# Patient Record
Sex: FEMALE | Race: BLACK OR AFRICAN AMERICAN | NOT HISPANIC OR LATINO | Employment: OTHER | URBAN - METROPOLITAN AREA
[De-identification: names, ages, dates, MRNs, and addresses within clinical notes are randomized per-mention and may not be internally consistent; named-entity substitution may affect disease eponyms.]

---

## 2017-03-03 RX ORDER — METOPROLOL SUCCINATE 50 MG/1
TABLET, EXTENDED RELEASE ORAL
Qty: 270 TABLET | Refills: 1 | Status: SHIPPED | OUTPATIENT
Start: 2017-03-03 | End: 2017-08-23 | Stop reason: SDUPTHER

## 2017-03-06 ENCOUNTER — TELEPHONE (OUTPATIENT)
Dept: INTERNAL MEDICINE | Facility: CLINIC | Age: 82
End: 2017-03-06

## 2017-03-06 DIAGNOSIS — I48.20 CHRONIC ATRIAL FIBRILLATION: Chronic | ICD-10-CM

## 2017-03-06 DIAGNOSIS — I27.20 PULMONARY HTN: Chronic | ICD-10-CM

## 2017-03-06 DIAGNOSIS — Z86.73 H/O: CVA (CEREBROVASCULAR ACCIDENT): Chronic | ICD-10-CM

## 2017-03-06 DIAGNOSIS — I10 ESSENTIAL HYPERTENSION: Chronic | ICD-10-CM

## 2017-03-06 DIAGNOSIS — L97.209: ICD-10-CM

## 2017-03-06 DIAGNOSIS — I50.22 SYSTOLIC CHF, CHRONIC: Chronic | ICD-10-CM

## 2017-03-06 DIAGNOSIS — H40.9 GLAUCOMA, UNSPECIFIED GLAUCOMA, UNSPECIFIED LATERALITY: Chronic | ICD-10-CM

## 2017-03-06 DIAGNOSIS — R60.0 BILATERAL EDEMA OF LOWER EXTREMITY: Chronic | ICD-10-CM

## 2017-03-06 NOTE — TELEPHONE ENCOUNTER
----- Message from Chetna Humphrey sent at 3/6/2017  9:24 AM CST -----  Contact: Jadyn Rogers with [facility]/[phone]   Need order in physician notes, pt requesting a power wheelchair     Att to Jadyn TATUM/Fax #[phone]    Please advise

## 2017-04-02 DIAGNOSIS — I48.20 CHRONIC ATRIAL FIBRILLATION: ICD-10-CM

## 2017-04-09 DIAGNOSIS — I48.20 CHRONIC ATRIAL FIBRILLATION: ICD-10-CM

## 2017-04-10 RX ORDER — RIVAROXABAN 20 MG/1
TABLET, FILM COATED ORAL
Qty: 90 TABLET | Refills: 0 | Status: SHIPPED | OUTPATIENT
Start: 2017-04-10 | End: 2017-04-28 | Stop reason: SDUPTHER

## 2017-04-27 ENCOUNTER — TELEPHONE (OUTPATIENT)
Dept: INTERNAL MEDICINE | Facility: CLINIC | Age: 82
End: 2017-04-27

## 2017-04-27 DIAGNOSIS — I48.20 CHRONIC ATRIAL FIBRILLATION: ICD-10-CM

## 2017-04-27 NOTE — TELEPHONE ENCOUNTER
Spoke w pt daughter; states that she was given only  two bottles and not a third one. Pt states that mother is out of medication.     Please advise.

## 2017-04-27 NOTE — TELEPHONE ENCOUNTER
----- Message from Joanne Alem sent at 4/27/2017  4:37 PM CDT -----  Contact: pt [phone]  RX request - refill or new RX.  Is this a refill or new RX:  refill  RX name and strength: rivaroxaban (XARELTO) 20 mg Tab  Directions:   Is this a 30 day or 90 day RX:    Pharmacy name and phone #: [facility] #[phone]   Comments:  Pt said the pharmacy said they filled this for a 90 supply,pt said it was not filled for 90 days and now she is out,please advise pt

## 2017-05-15 RX ORDER — VALSARTAN 320 MG/1
TABLET ORAL
Qty: 90 TABLET | Refills: 11 | Status: SHIPPED | OUTPATIENT
Start: 2017-05-15 | End: 2018-07-30 | Stop reason: SDUPTHER

## 2017-05-15 RX ORDER — AMLODIPINE BESYLATE 10 MG/1
TABLET ORAL
Qty: 90 TABLET | Refills: 11 | Status: SHIPPED | OUTPATIENT
Start: 2017-05-15 | End: 2018-07-30 | Stop reason: SDUPTHER

## 2017-06-16 RX ORDER — POTASSIUM CHLORIDE 20 MEQ/1
TABLET, EXTENDED RELEASE ORAL
Qty: 90 TABLET | Refills: 3 | Status: SHIPPED | OUTPATIENT
Start: 2017-06-16 | End: 2018-07-30 | Stop reason: SDUPTHER

## 2017-07-31 ENCOUNTER — NURSE TRIAGE (OUTPATIENT)
Dept: ADMINISTRATIVE | Facility: CLINIC | Age: 82
End: 2017-07-31

## 2017-07-31 NOTE — TELEPHONE ENCOUNTER
"    Reason for Disposition   Patient sounds very sick or weak to the triager     Celina has bilateral leg swelling from feet to thighs, with redness (like a rash, per daughter, Elisa) on the lower leg, right worse than left.  Pain 10 of 10 when touched right, 8 of 10 when touched on left.  Recommended ED now for her; Elisa will bring her to [facility] ED now, she said.  Message to Chago Jones , pcp.  Please contact caller directly with any additional care advice.    Answer Assessment - Initial Assessment Questions  1. ONSET: "When did the swelling start?" (e.g., minutes, hours, days)      Yesterday.    2. LOCATION: "What part of the leg is swollen?"  "Are both legs swollen or just one leg?"      From her thighs to her feet.    3. SEVERITY: "How bad is the swelling?" (e.g., localized; mild, moderate, severe)   - Localized - small area of swelling localized to one leg   - MILD pedal edema - swelling limited to foot and ankle, pitting edema < 1/4 inch (6 mm) deep, rest and elevation eliminate most or all swelling   - MODERATE edema - swelling of lower leg to knee, pitting edema > 1/4 inch (6 mm) deep, rest and elevation only partially reduce swelling   - SEVERE edema - swelling extends above knee, facial or hand swelling present       Severe.  Bilateral up to thighs.    4. REDNESS: "Does the swelling look red or infected?"      Yes, the right leg worse than the left, and is larger than the left.    5. PAIN: "Is the swelling painful to touch?" If so, ask: "How painful is it?"   (Scale 1-10; mild, moderate or severe)      Yes, painful to touch, right leg 10 of 10, left leg 8 of 10.    6. FEVER: "Do you have a fever?" If so, ask: "What is it, how was it measured, and when did it start?"       Not that I know of.    7. CAUSE: "What do you think is causing the leg swelling?"      Not known.    8. MEDICAL HISTORY: "Do you have a history of heart failure, kidney disease, liver failure, or cancer?"      Yes, CHF.    9. " "RECURRENT SYMPTOM: "Have you had leg swelling before?" If so, ask: "When was the last time?" "What happened that time?"      Yes, but not this bad.    10. OTHER SYMPTOMS: "Do you have any other symptoms?" (e.g., chest pain, difficulty breathing)        No chest pain, no difficulty breathing.  n  11. PREGNANCY: "Is there any chance you are pregnant?" "When was your last menstrual period?"        n/a    Protocols used: ST LEG SWELLING AND EDEMA-A-AH      "

## 2017-08-01 ENCOUNTER — HOSPITAL ENCOUNTER (EMERGENCY)
Facility: HOSPITAL | Age: 82
Discharge: HOME | End: 2017-08-01
Attending: EMERGENCY MEDICINE | Admitting: EMERGENCY MEDICINE
Payer: MEDICARE

## 2017-08-01 ENCOUNTER — TELEPHONE (OUTPATIENT)
Dept: INTERNAL MEDICINE | Facility: CLINIC | Age: 82
End: 2017-08-01

## 2017-08-01 VITALS
WEIGHT: 240 LBS | HEART RATE: 75 BPM | HEIGHT: 66 IN | TEMPERATURE: 98 F | OXYGEN SATURATION: 100 % | SYSTOLIC BLOOD PRESSURE: 116 MMHG | DIASTOLIC BLOOD PRESSURE: 61 MMHG | RESPIRATION RATE: 18 BRPM | BODY MASS INDEX: 38.57 KG/M2

## 2017-08-01 DIAGNOSIS — I50.9 CHF (CONGESTIVE HEART FAILURE): ICD-10-CM

## 2017-08-01 DIAGNOSIS — Z86.73 H/O: CVA (CEREBROVASCULAR ACCIDENT): Chronic | ICD-10-CM

## 2017-08-01 DIAGNOSIS — I50.22 SYSTOLIC CHF, CHRONIC: Chronic | ICD-10-CM

## 2017-08-01 DIAGNOSIS — Z79.01 LONG TERM (CURRENT) USE OF ANTICOAGULANTS: Chronic | ICD-10-CM

## 2017-08-01 LAB
ALBUMIN SERPL BCP-MCNC: 3.6 G/DL
ALP SERPL-CCNC: 79 U/L
ALT SERPL W/O P-5'-P-CCNC: <5 U/L
ANION GAP SERPL CALC-SCNC: 7 MMOL/L
AST SERPL-CCNC: 14 U/L
BASOPHILS # BLD AUTO: 0.03 K/UL
BASOPHILS NFR BLD: 0.5 %
BILIRUB SERPL-MCNC: 0.8 MG/DL
BNP SERPL-MCNC: 317 PG/ML
BUN SERPL-MCNC: 15 MG/DL
CALCIUM SERPL-MCNC: 9.9 MG/DL
CHLORIDE SERPL-SCNC: 102 MMOL/L
CO2 SERPL-SCNC: 28 MMOL/L
CREAT SERPL-MCNC: 0.9 MG/DL
DIFFERENTIAL METHOD: ABNORMAL
EOSINOPHIL # BLD AUTO: 0.2 K/UL
EOSINOPHIL NFR BLD: 2.9 %
ERYTHROCYTE [DISTWIDTH] IN BLOOD BY AUTOMATED COUNT: 14.8 %
EST. GFR  (AFRICAN AMERICAN): >60 ML/MIN/1.73 M^2
EST. GFR  (NON AFRICAN AMERICAN): 58.9 ML/MIN/1.73 M^2
GLUCOSE SERPL-MCNC: 110 MG/DL
HCT VFR BLD AUTO: 32.6 %
HGB BLD-MCNC: 10.7 G/DL
LYMPHOCYTES # BLD AUTO: 1.7 K/UL
LYMPHOCYTES NFR BLD: 29.4 %
MCH RBC QN AUTO: 29 PG
MCHC RBC AUTO-ENTMCNC: 32.8 G/DL
MCV RBC AUTO: 88 FL
MONOCYTES # BLD AUTO: 0.6 K/UL
MONOCYTES NFR BLD: 9.6 %
NEUTROPHILS # BLD AUTO: 3.4 K/UL
NEUTROPHILS NFR BLD: 57.4 %
PLATELET # BLD AUTO: 206 K/UL
PMV BLD AUTO: 10.3 FL
POTASSIUM SERPL-SCNC: 3.6 MMOL/L
PROT SERPL-MCNC: 7.8 G/DL
RBC # BLD AUTO: 3.69 M/UL
SODIUM SERPL-SCNC: 137 MMOL/L
WBC # BLD AUTO: 5.85 K/UL

## 2017-08-01 PROCEDURE — 93010 ELECTROCARDIOGRAM REPORT: CPT | Mod: ,,, | Performed by: INTERNAL MEDICINE

## 2017-08-01 PROCEDURE — 83880 ASSAY OF NATRIURETIC PEPTIDE: CPT

## 2017-08-01 PROCEDURE — 80053 COMPREHEN METABOLIC PANEL: CPT

## 2017-08-01 PROCEDURE — 85025 COMPLETE CBC W/AUTO DIFF WBC: CPT

## 2017-08-01 PROCEDURE — 99284 EMERGENCY DEPT VISIT MOD MDM: CPT | Mod: 25

## 2017-08-01 PROCEDURE — 93005 ELECTROCARDIOGRAM TRACING: CPT

## 2017-08-01 PROCEDURE — 63600175 PHARM REV CODE 636 W HCPCS: Performed by: PHYSICIAN ASSISTANT

## 2017-08-01 PROCEDURE — 96374 THER/PROPH/DIAG INJ IV PUSH: CPT

## 2017-08-01 PROCEDURE — 99285 EMERGENCY DEPT VISIT HI MDM: CPT | Mod: ,,, | Performed by: PHYSICIAN ASSISTANT

## 2017-08-01 RX ORDER — FUROSEMIDE 10 MG/ML
40 INJECTION INTRAMUSCULAR; INTRAVENOUS
Status: COMPLETED | OUTPATIENT
Start: 2017-08-01 | End: 2017-08-01

## 2017-08-01 RX ADMIN — FUROSEMIDE 40 MG: 10 INJECTION, SOLUTION INTRAVENOUS at 05:08

## 2017-08-01 NOTE — DISCHARGE INSTRUCTIONS
Follow up with your primary care provider  Increased your Lasix to 80 mg,  take 40 mg twice daily for 3 days  Return to the ER for any new symptoms

## 2017-08-01 NOTE — ED TRIAGE NOTES
PATIENT PRESENTS COMPLAINING OF BILATERAL LOWER EXTREMITY SWELLING AND PAIN. A/O X 4, GCS 15, NAD NOTED, EDEMA NOTED TO LOWER EXTREMITIES.

## 2017-08-01 NOTE — TELEPHONE ENCOUNTER
Spoke with pt daughter to set up appt for HOSP F/U with PCP. Pt's daughter will call back to schedule, she have to check her work schedule.

## 2017-08-01 NOTE — ED PROVIDER NOTES
Encounter Date: 8/1/2017    SCRIBE #1 NOTE: I, Bill Stokes, am scribing for, and in the presence of,  Dr. Michaels. I have scribed the following portions of the note - the EKG reading.       History     Chief Complaint   Patient presents with    fluid overload     pt complaining of swelling to bilateral legs. pt denies chest pain or SOB     84-year-old female presents to the ER with her daughter for evaluation of fluid overload.  Patient reports today she has noticed increased swelling to her legs bilaterally.  She denies any shortness of breath, chest pain, fever, chills.  She denies any nausea vomiting or abdominal pain.  She has no orthopnea or PND.  Patient does take diuretics at home, Lasix 40 once daily.  Patient is speaking in complete sentences and does not appear to be winded.           Review of patient's allergies indicates:   Allergen Reactions    Iodine and iodide containing products Hives    Sulfa (sulfonamide antibiotics) Hives     Past Medical History:   Diagnosis Date    Anticoagulant long-term use     Arthritis     Atrial fibrillation     Cataract     Cataract associated with another disorder     CHF (congestive heart failure) 1/10/2013    Hypertension     Potassium (K) deficiency     Stroke      Past Surgical History:   Procedure Laterality Date    HYSTERECTOMY       Family History   Problem Relation Age of Onset    Hypertension Mother     Hypertension Sister     Hypertension Brother     Amblyopia Neg Hx     Blindness Neg Hx     Cancer Neg Hx     Cataracts Neg Hx     Diabetes Neg Hx     Glaucoma Neg Hx     Macular degeneration Neg Hx     Retinal detachment Neg Hx     Strabismus Neg Hx     Stroke Neg Hx     Thyroid disease Neg Hx      Social History   Substance Use Topics    Smoking status: Never Smoker    Smokeless tobacco: Not on file    Alcohol use No     Review of Systems   Constitutional: Negative for fever.   HENT: Negative for sore throat.    Respiratory:  Negative for shortness of breath.    Cardiovascular: Negative for chest pain.   Gastrointestinal: Negative for nausea.   Genitourinary: Negative for dysuria.   Musculoskeletal: Negative for back pain.   Skin: Negative for rash.   Neurological: Negative for weakness.   Hematological: Does not bruise/bleed easily.       Physical Exam     Initial Vitals [08/01/17 0132]   BP Pulse Resp Temp SpO2   (!) 177/91 71 18 98.2 °F (36.8 °C) 98 %      MAP       119.67         Physical Exam    Constitutional: Vital signs are normal. She appears well-developed and well-nourished. She is not diaphoretic. No distress.   HENT:   Head: Normocephalic and atraumatic.   Right Ear: External ear normal.   Left Ear: External ear normal.   Eyes: Conjunctivae and EOM are normal. Right eye exhibits no discharge.   Cardiovascular: Normal rate. Exam reveals no gallop and no friction rub.    No murmur heard.  Pulmonary/Chest: No respiratory distress. She has no wheezes. She has no rhonchi. She has no rales. She exhibits no tenderness.   CTAB   Abdominal: Soft. Normal appearance, normal aorta and bowel sounds are normal. She exhibits no distension and no mass. There is no tenderness. There is no rebound and no guarding.   Musculoskeletal: Normal range of motion.   2+ edema to the bilateral lower extremities up to the knees   Neurological: She is alert and oriented to person, place, and time.   Skin: Skin is warm and intact.   Psychiatric: She has a normal mood and affect. Her speech is normal and behavior is normal. Cognition and memory are normal.         ED Course   Procedures  Labs Reviewed   CBC W/ AUTO DIFFERENTIAL - Abnormal; Notable for the following:        Result Value    RBC 3.69 (*)     Hemoglobin 10.7 (*)     Hematocrit 32.6 (*)     RDW 14.8 (*)     All other components within normal limits   COMPREHENSIVE METABOLIC PANEL - Abnormal; Notable for the following:     ALT <5 (*)     Anion Gap 7 (*)     eGFR if non  58.9 (*)      All other components within normal limits   B-TYPE NATRIURETIC PEPTIDE - Abnormal; Notable for the following:      (*)     All other components within normal limits     EKG Readings: (Independently Interpreted)   Atrial fibrillation, left axis deviation, Q waves inferiorly, no ST elevation or depression.           Medical Decision Making:   History:   Old Medical Records: I decided to obtain old medical records.  Independently Interpreted Test(s):   I have ordered and independently interpreted EKG Reading(s) - see prior notes  Clinical Tests:   Lab Tests: Ordered and Reviewed  Radiological Study: Ordered and Reviewed  Medical Tests: Ordered and Reviewed  ED Management:  84-year-old female presenting to the ER with edema to the bilateral lower extremities  Chest x-ray reveals no acute findings, BNP slightly elevated  Clinically the patient does not look significantly fluid overloaded, and she has no respiratory symptoms.   She was treated in the ED with 40 mg of Lasix IV.  I will double her Lasix for the next couple days and recommend that she follow up with her PCP.  Return instructions given.  Patient stable for discharge.            Scribe Attestation:   Scribe #1: I performed the above scribed service and the documentation accurately describes the services I performed. I attest to the accuracy of the note.    Attending Attestation:     Physician Attestation Statement for NP/PA:   I discussed this assessment and plan of this patient with the NP/PA, but I did not personally examine the patient. The face to face encounter was performed by the NP/PA.    Other NP/PA Attestation Additions:    History of Present Illness: Leg swelling         Physician Attestation for Scribe:  Physician Attestation Statement for Scribe #1: I, Dr. Michaels, reviewed documentation, as scribed by Bill Stokes in my presence, and it is both accurate and complete.                 ED Course     Clinical Impression:   The encounter  diagnosis was CHF (congestive heart failure).    Disposition:   Disposition: Discharged  Condition: Stable                        Remy Martins PA-C  08/01/17 0705       Bhanu Michaels III, MD  08/02/17 0684

## 2017-08-01 NOTE — TELEPHONE ENCOUNTER
Pt's daughter Viky came by the office this morning. Pt was in the ER for bilateral edema and was discharged. Viky would like dr Jones to give her a call.

## 2017-08-03 NOTE — TELEPHONE ENCOUNTER
Spoke with pt daughter, she can not come tomorrow. Scheduled appt at daughter's availability. Also daughter stated she discussed  involvement with you last visit and would like to see if possible can a  be scheduled/available on day of appt with you. Please advise.

## 2017-08-03 NOTE — TELEPHONE ENCOUNTER
Hi, please call her daughter --  I was out of town and just returned today, I am sorry for the delay in calling back. Please see if patient would like a followup with me, I can add her on for tomorrow/Friday at 11am.  Please let me know.  Thank you, Chago Jones

## 2017-08-03 NOTE — TELEPHONE ENCOUNTER
Hi, [facility] has their own /social workers that they use and she will need to call [facility] for help with a .  Thank you, Chago Jones

## 2017-08-04 ENCOUNTER — OUTPATIENT CASE MANAGEMENT (OUTPATIENT)
Dept: ADMINISTRATIVE | Facility: OTHER | Age: 82
End: 2017-08-04

## 2017-08-04 NOTE — TELEPHONE ENCOUNTER
Spoke with daughter, she canceled 08/09/17 appt because she has one in [place] but I was able to get them in on 08/08/17.

## 2017-08-04 NOTE — TELEPHONE ENCOUNTER
Hi, I recommend the lasix 40mg twice per day and come in next week (do she need a refill?). Is there a time she can come in next week? I see that they rescheduled 8/9 to 8/15.  Thank you, Chago Jones

## 2017-08-04 NOTE — TELEPHONE ENCOUNTER
Spoke with pt daughter, daughter is stating that Pt still having excess swelling in her legs and feet even after increasing Lasix medication to two a day. Pt's daughter would like to know if she should continue two Lasix, decrease back to one Lasix or schedule urgent appt? Please advise.

## 2017-08-04 NOTE — PROGRESS NOTES
Please note the following patient's information has been forwarded to [facility] for case mgmt or  by Outpatient Case Management.    Please see the media section of patient's chart for additional details.    Please contact Ext. [phone] with any questions.    Thank you,    Paloa Kirkpatrick, SSC

## 2017-08-15 ENCOUNTER — TELEPHONE (OUTPATIENT)
Dept: INTERNAL MEDICINE | Facility: CLINIC | Age: 82
End: 2017-08-15

## 2017-08-15 NOTE — TELEPHONE ENCOUNTER
----- Message from Unique Deng sent at 8/15/2017 10:39 AM CDT -----  Contact: Daughter/Maribel/[phone] cell   Pt's daughter said that she is calling in regards to needing to schedule pt for an appointment next week she is asking for anytime after 2pm she would like the appt mailed to her at:[address], [place].[address]. Please call and advise              Thanks!!!

## 2017-08-16 ENCOUNTER — TELEPHONE (OUTPATIENT)
Dept: INTERNAL MEDICINE | Facility: CLINIC | Age: 82
End: 2017-08-16

## 2017-08-16 NOTE — TELEPHONE ENCOUNTER
Hi, please call her daughter --  It has been over one year since I last saw her. I know that she has tried to schedule an appt with me, but they have canceled. Please try the daughter again and see if they can try again to schedule an appt, please let me know.  Thank you, Chago Jones

## 2017-08-16 NOTE — TELEPHONE ENCOUNTER
----- Message from Candice Nowak sent at 8/16/2017 10:45 AM CDT -----  Contact: Self/ [phone]   Pt want to speak with someone in the office about changing the appt on 08/25. Please call and advise     Thank you

## 2017-08-23 ENCOUNTER — OFFICE VISIT (OUTPATIENT)
Dept: INTERNAL MEDICINE | Facility: CLINIC | Age: 82
End: 2017-08-23
Payer: MEDICARE

## 2017-08-23 VITALS
HEART RATE: 72 BPM | WEIGHT: 237.44 LBS | DIASTOLIC BLOOD PRESSURE: 70 MMHG | SYSTOLIC BLOOD PRESSURE: 100 MMHG | BODY MASS INDEX: 38.91 KG/M2

## 2017-08-23 DIAGNOSIS — I10 ESSENTIAL HYPERTENSION: Chronic | ICD-10-CM

## 2017-08-23 DIAGNOSIS — K21.9 GASTROESOPHAGEAL REFLUX DISEASE WITHOUT ESOPHAGITIS: ICD-10-CM

## 2017-08-23 DIAGNOSIS — B35.1 ONYCHOMYCOSIS: ICD-10-CM

## 2017-08-23 DIAGNOSIS — I50.22 SYSTOLIC CHF, CHRONIC: Primary | Chronic | ICD-10-CM

## 2017-08-23 PROCEDURE — 3078F DIAST BP <80 MM HG: CPT | Mod: S$GLB,,, | Performed by: INTERNAL MEDICINE

## 2017-08-23 PROCEDURE — 3074F SYST BP LT 130 MM HG: CPT | Mod: S$GLB,,, | Performed by: INTERNAL MEDICINE

## 2017-08-23 PROCEDURE — 99999 PR PBB SHADOW E&M-EST. PATIENT-LVL III: CPT | Mod: PBBFAC,,, | Performed by: INTERNAL MEDICINE

## 2017-08-23 PROCEDURE — 99215 OFFICE O/P EST HI 40 MIN: CPT | Mod: S$GLB,,, | Performed by: INTERNAL MEDICINE

## 2017-08-23 PROCEDURE — 3008F BODY MASS INDEX DOCD: CPT | Mod: S$GLB,,, | Performed by: INTERNAL MEDICINE

## 2017-08-23 PROCEDURE — 1159F MED LIST DOCD IN RCRD: CPT | Mod: S$GLB,,, | Performed by: INTERNAL MEDICINE

## 2017-08-23 PROCEDURE — 99499 UNLISTED E&M SERVICE: CPT | Mod: S$GLB,,, | Performed by: INTERNAL MEDICINE

## 2017-08-23 RX ORDER — ESOMEPRAZOLE MAGNESIUM 40 MG/1
40 CAPSULE, DELAYED RELEASE ORAL
Qty: 90 CAPSULE | Refills: 3 | Status: SHIPPED | OUTPATIENT
Start: 2017-08-23 | End: 2018-08-29 | Stop reason: SDUPTHER

## 2017-08-23 RX ORDER — METOPROLOL SUCCINATE 50 MG/1
TABLET, EXTENDED RELEASE ORAL
Qty: 270 TABLET | Refills: 11 | Status: SHIPPED | OUTPATIENT
Start: 2017-08-23 | End: 2018-08-28 | Stop reason: SDUPTHER

## 2017-08-23 RX ORDER — FUROSEMIDE 40 MG/1
40 TABLET ORAL DAILY
Qty: 90 TABLET | Refills: 11 | Status: SHIPPED | OUTPATIENT
Start: 2017-08-23 | End: 2017-09-05

## 2017-08-23 NOTE — PROGRESS NOTES
Subjective:       Patient ID: Celina Parra is a 84 y.o. female.    Chief Complaint: No chief complaint on file.    Feet were v swollen and went to ED. Swelling is now btr.    Some sores on the lower legs.    Housing difficulties, was living in [facility] and evicted since apt super was concerned about her safety since there were steps within the unit. Now in hotel and awaiting housing. very challging to get out of house, needs foot care.    She c/o sinus gtt, chronic stable.    Shoulder pains bilat.    Significant nocturia at noc.      Review of Systems   Constitutional: Negative for activity change, appetite change, diaphoresis, fatigue, fever and unexpected weight change.   HENT: Negative for congestion.    Respiratory: Negative for chest tightness and wheezing.    Cardiovascular: Positive for leg swelling. Negative for chest pain and palpitations.   Gastrointestinal: Negative for abdominal distention, abdominal pain, blood in stool, nausea and vomiting.   Genitourinary: Negative for decreased urine volume and difficulty urinating.        Has large volume noc time incont   Musculoskeletal: Positive for arthralgias, gait problem and joint swelling (L knee). Negative for back pain, neck pain and neck stiffness.   Skin: Negative for wound.   Neurological: Positive for weakness. Negative for syncope and headaches.   Hematological: Bruises/bleeds easily.        Small blister posterior L calf   Psychiatric/Behavioral: Negative for confusion.       Objective:      Physical Exam   Constitutional: She is oriented to person, place, and time. She appears well-developed and well-nourished. No distress.   Not toxic appearing.  Very weak and difficult even to stand to have wt checked   HENT:   Head: Atraumatic.   Mouth/Throat: No oropharyngeal exudate.   Eyes: Pupils are equal, round, and reactive to light. No scleral icterus.   Cardiovascular:   Irregularly irregular     Pulmonary/Chest: Effort normal and breath sounds normal.    Abdominal: Soft. Bowel sounds are normal. She exhibits no distension. There is no tenderness.   Musculoskeletal:   Minimal movement of the L knee causes pain and voluntary guarding.  L knee bony hypertrophy w/o warmth or redness.  Decreased rom.    Feet with onychogryphosis in nails   Neurological: She is alert and oriented to person, place, and time.   Unable to weight bear alone, bilat prox muscles 4/5 strength to resistant testing.       Skin: Skin is warm and dry. She is not diaphoretic.   A few dry blisters posterior L calf with chronic venous stasis. No ulcers or open wounds of the legs   Psychiatric: She has a normal mood and affect. Her behavior is normal.       Assessment:       1. Onychomycosis    2. Systolic CHF, chronic    3. Essential hypertension    4. Gastroesophageal reflux disease without esophagitis        Plan:       Diagnoses and all orders for this visit:    Onychomycosis  -     Ambulatory Referral to Podiatry    Systolic CHF, chronic  -     metoprolol succinate (TOPROL-XL) 50 MG 24 hr tablet; TAKE 3 TABLETS (150 MG TOTAL) BY MOUTH ONCE DAILY.  -     furosemide (LASIX) 40 MG tablet; Take 1 tablet (40 mg total) by mouth once daily.  Volume stable, chronic edema.  Leg edema at baseline now, posterior L lower leg with small blisters from edema, she will monitor. No SOI at this time    Essential hypertension  -     furosemide (LASIX) 40 MG tablet; Take 1 tablet (40 mg total) by mouth once daily.  controlled    Gastroesophageal reflux disease without esophagitis  -     esomeprazole (NEXIUM) 40 MG capsule; Take 1 capsule (40 mg total) by mouth before breakfast.    Care coordination with [facility] re CHF care, case management re housing    Over 40 minutes spent with patient and majority in counseling and patient education.      Return in about 3 months (around 11/23/2017).

## 2017-08-24 ENCOUNTER — TELEPHONE (OUTPATIENT)
Dept: INTERNAL MEDICINE | Facility: CLINIC | Age: 82
End: 2017-08-24

## 2017-08-24 NOTE — TELEPHONE ENCOUNTER
From Nicole from [employer]:  Good Morning ,    I just wanted to follow up on this request  . I s/w the daughter on yesterday . The patient is already connected with a [address] [employer] voucher at this time for housing , [facility] provides residents with a . The patient voucher is still active. The daughter is aware of the  and has contact info . I encouraged the daughter to keep in touch with the  about difficulty finding homes/ apartments that accept the voucher. I also provided her with the following resources: contact number for [employer] and mailed a senior resource guide .     We discussed alternative living arrangements for patient , assisted living v/s nursing home - currently not interested at this time . She would like to continue to look for an apartment for the patient to live independently at this time.     The daughter was offered CHF Case Management earlier this month and declined. I explained the program to her , The program is predominantly tele-health with a registered nurse to provide education r/t the chronic disease as well as review medication adherence and symptom management at home . She agreed to accept the education . The patient and a need for a referral was discussed  in our Case Conference on yesterday . The  will attempt outreach again to the daughter .     Our Care Team discussed the patient being involved in the teaching as well . We reviewed her chronic diseases and did not note any cognitive barriers.  The patient lives alone , daughter acknowledged not living w/ the patient . Reports she does cook for the patient. Caregiver support will be evaluated by  regarding medication administration and meal preparation .      You will receive an update / case conference from the  after her contact with the daughter.

## 2017-09-04 DIAGNOSIS — I50.22 SYSTOLIC CHF, CHRONIC: Chronic | ICD-10-CM

## 2017-09-04 DIAGNOSIS — I10 ESSENTIAL HYPERTENSION: Chronic | ICD-10-CM

## 2017-09-05 RX ORDER — FUROSEMIDE 40 MG/1
40 TABLET ORAL DAILY
Qty: 90 TABLET | Refills: 3 | Status: SHIPPED | OUTPATIENT
Start: 2017-09-05 | End: 2018-08-29

## 2017-09-28 DIAGNOSIS — R09.81 SINUS CONGESTION: ICD-10-CM

## 2017-09-29 RX ORDER — FLUTICASONE PROPIONATE 50 MCG
SPRAY, SUSPENSION (ML) NASAL
Qty: 48 G | Refills: 11 | Status: SHIPPED | OUTPATIENT
Start: 2017-09-29 | End: 2018-10-28 | Stop reason: SDUPTHER

## 2017-11-15 ENCOUNTER — IMMUNIZATION (OUTPATIENT)
Dept: INTERNAL MEDICINE | Facility: CLINIC | Age: 82
End: 2017-11-15
Payer: MEDICARE

## 2017-11-15 ENCOUNTER — OFFICE VISIT (OUTPATIENT)
Dept: INTERNAL MEDICINE | Facility: CLINIC | Age: 82
End: 2017-11-15
Payer: MEDICARE

## 2017-11-15 VITALS
BODY MASS INDEX: 38.04 KG/M2 | HEART RATE: 72 BPM | SYSTOLIC BLOOD PRESSURE: 121 MMHG | WEIGHT: 232.13 LBS | DIASTOLIC BLOOD PRESSURE: 87 MMHG

## 2017-11-15 DIAGNOSIS — K21.9 GASTROESOPHAGEAL REFLUX DISEASE WITHOUT ESOPHAGITIS: ICD-10-CM

## 2017-11-15 DIAGNOSIS — I50.22 SYSTOLIC CHF, CHRONIC: Primary | Chronic | ICD-10-CM

## 2017-11-15 DIAGNOSIS — I48.20 CHRONIC ATRIAL FIBRILLATION: Chronic | ICD-10-CM

## 2017-11-15 DIAGNOSIS — R60.0 BILATERAL EDEMA OF LOWER EXTREMITY: Chronic | ICD-10-CM

## 2017-11-15 DIAGNOSIS — I10 ESSENTIAL HYPERTENSION: Chronic | ICD-10-CM

## 2017-11-15 DIAGNOSIS — Z79.01 LONG TERM CURRENT USE OF ANTICOAGULANT THERAPY: ICD-10-CM

## 2017-11-15 PROCEDURE — G0008 ADMIN INFLUENZA VIRUS VAC: HCPCS | Mod: S$GLB,,, | Performed by: INTERNAL MEDICINE

## 2017-11-15 PROCEDURE — 99999 PR PBB SHADOW E&M-EST. PATIENT-LVL III: CPT | Mod: PBBFAC,,, | Performed by: INTERNAL MEDICINE

## 2017-11-15 PROCEDURE — 99214 OFFICE O/P EST MOD 30 MIN: CPT | Mod: S$GLB,,, | Performed by: INTERNAL MEDICINE

## 2017-11-15 PROCEDURE — 90662 IIV NO PRSV INCREASED AG IM: CPT | Mod: S$GLB,,, | Performed by: INTERNAL MEDICINE

## 2017-11-15 PROCEDURE — 99499 UNLISTED E&M SERVICE: CPT | Mod: S$GLB,,, | Performed by: INTERNAL MEDICINE

## 2017-11-15 NOTE — PROGRESS NOTES
Subjective:       Patient ID: Celina Parra is a 84 y.o. female.    Chief Complaint: Follow-up    Throat irritation, bitter back of throat as well. Stomach pains upper abd. She gets acid burn in cheast as wekl. Some regur of food with cough.    No new other symptoms.    Taking meds except prn nexium.    Not coughing up anything purulent.    No f/c/ns.    No recent episodes of extreme dyspnea.    Most significant issue with chronic nocturia. Drinks 16 oz at noc and cup of tear.    Never receibed home POV.      Review of Systems   Constitutional: Negative for activity change, appetite change, diaphoresis, fatigue, fever and unexpected weight change.   HENT: Negative for congestion.    Respiratory: Negative for chest tightness and wheezing.    Cardiovascular: Negative for chest pain, palpitations and leg swelling (no longer).   Gastrointestinal: Negative for abdominal distention, abdominal pain, blood in stool, nausea and vomiting.   Genitourinary: Negative for decreased urine volume and difficulty urinating.        Has large volume noc time incont   Musculoskeletal: Positive for arthralgias, gait problem and joint swelling (L knee). Negative for back pain, neck pain and neck stiffness.   Skin: Negative for wound.   Neurological: Positive for weakness. Negative for syncope and headaches.   Hematological: Bruises/bleeds easily.   Psychiatric/Behavioral: Negative for confusion.       Objective:      Physical Exam   Constitutional: She is oriented to person, place, and time. She appears well-developed and well-nourished. No distress.   Not toxic appearing.  Very weak and difficult even to stand to have wt checked   HENT:   Head: Atraumatic.   Mouth/Throat: No oropharyngeal exudate.   Eyes: Pupils are equal, round, and reactive to light. No scleral icterus.   Cardiovascular:   Irregularly irregular     Pulmonary/Chest: Effort normal and breath sounds normal.   Abdominal: Soft. Bowel sounds are normal. She exhibits no  distension. There is no tenderness.   Musculoskeletal:   Minimal movement of the L knee causes pain and voluntary guarding.  L knee bony hypertrophy w/o warmth or redness.  Decreased rom.    Feet with onychogryphosis in nails   Neurological: She is alert and oriented to person, place, and time.   Unable to weight bear alone, bilat prox muscles 4/5 strength to resistant testing.       Skin: Skin is warm and dry. She is not diaphoretic.   No current blisters, No ulcers or open wounds of the legs   Psychiatric: She has a normal mood and affect. Her behavior is normal.       Assessment:       No diagnosis found.    Plan:         Here for f/u.    She is relatively stable w/o any major changes.    She definitely would benefit from a POV/electric wheelchair, will email Nicole re status.    CHF is stable on current meds, she is near euvolemic.    Housing situatioh much better now.    She has symptoms of GERD with bitter waterbrash and actual acid burn/reflux.  Lengthy discussion re importance of daily nexium use before eating.    Large volume nocturia -- cut out liquids, especially tea, after 6pm.    There are no diagnoses linked to this encounter.    Health Maintenance       Date Due Completion Date    TETANUS VACCINE 05/28/1951 ---    DEXA SCAN 05/28/1973 ---    Zoster Vaccine 05/28/1993 ---    Influenza Vaccine 08/01/2017 12/1/2015    Lipid Panel 09/23/2020 9/23/2015      Flu vax please      Return in about 3 months (around 2/15/2018).

## 2017-11-27 NOTE — PROGRESS NOTES
Patient, Celina Parra (MRN #[id number]), presented with a recorded BMI of 38.04 kg/m^2 and a documented comorbidity(s):  - Hypertension  - Atrial Fibrillation  - Atrial Fibrillation  to which the severe obesity is a contributing factor. This is consistent with the definition of severe obesity (BMI 35.0-35.9) with comorbidity (ICD-10 E66.01, Z68.35). The patient's severe obesity was monitored, evaluated, addressed and/or treated. This addendum to the medical record is made on 11/27/2017.

## 2018-05-04 DIAGNOSIS — I48.20 CHRONIC ATRIAL FIBRILLATION: ICD-10-CM

## 2018-05-04 RX ORDER — RIVAROXABAN 20 MG/1
TABLET, FILM COATED ORAL
Qty: 90 TABLET | Refills: 3 | Status: SHIPPED | OUTPATIENT
Start: 2018-05-04 | End: 2019-04-09 | Stop reason: SDUPTHER

## 2018-07-30 RX ORDER — POTASSIUM CHLORIDE 20 MEQ/1
TABLET, EXTENDED RELEASE ORAL
Qty: 90 TABLET | Refills: 3 | Status: SHIPPED | OUTPATIENT
Start: 2018-07-30 | End: 2019-06-13

## 2018-07-30 RX ORDER — VALSARTAN 320 MG/1
TABLET ORAL
Qty: 90 TABLET | Refills: 4 | Status: SHIPPED | OUTPATIENT
Start: 2018-07-30 | End: 2018-09-12

## 2018-07-30 RX ORDER — AMLODIPINE BESYLATE 10 MG/1
TABLET ORAL
Qty: 90 TABLET | Refills: 4 | Status: SHIPPED | OUTPATIENT
Start: 2018-07-30 | End: 2019-08-12 | Stop reason: SDUPTHER

## 2018-08-28 DIAGNOSIS — I50.22 SYSTOLIC CHF, CHRONIC: Chronic | ICD-10-CM

## 2018-08-29 DIAGNOSIS — K21.9 GASTROESOPHAGEAL REFLUX DISEASE WITHOUT ESOPHAGITIS: ICD-10-CM

## 2018-08-29 DIAGNOSIS — I10 ESSENTIAL HYPERTENSION: Chronic | ICD-10-CM

## 2018-08-29 DIAGNOSIS — I50.22 SYSTOLIC CHF, CHRONIC: Chronic | ICD-10-CM

## 2018-08-29 RX ORDER — ESOMEPRAZOLE MAGNESIUM 40 MG/1
40 CAPSULE, DELAYED RELEASE ORAL
Qty: 90 CAPSULE | Refills: 3 | Status: SHIPPED | OUTPATIENT
Start: 2018-08-29 | End: 2019-02-27

## 2018-08-29 RX ORDER — FUROSEMIDE 40 MG/1
40 TABLET ORAL DAILY
Qty: 90 TABLET | Refills: 3 | Status: SHIPPED | OUTPATIENT
Start: 2018-08-29 | End: 2019-05-29

## 2018-08-29 RX ORDER — METOPROLOL SUCCINATE 50 MG/1
TABLET, EXTENDED RELEASE ORAL
Qty: 270 TABLET | Refills: 0 | Status: SHIPPED | OUTPATIENT
Start: 2018-08-29 | End: 2018-11-30 | Stop reason: SDUPTHER

## 2018-09-12 RX ORDER — VALSARTAN 320 MG/1
TABLET ORAL
Qty: 90 TABLET | Refills: 1 | Status: SHIPPED | OUTPATIENT
Start: 2018-09-12 | End: 2019-11-25 | Stop reason: SDUPTHER

## 2018-10-28 DIAGNOSIS — R09.81 SINUS CONGESTION: ICD-10-CM

## 2018-10-28 RX ORDER — FLUTICASONE PROPIONATE 50 MCG
SPRAY, SUSPENSION (ML) NASAL
Qty: 48 ML | Refills: 11 | Status: SHIPPED | OUTPATIENT
Start: 2018-10-28 | End: 2019-02-27

## 2018-11-30 DIAGNOSIS — I50.22 SYSTOLIC CHF, CHRONIC: Chronic | ICD-10-CM

## 2018-11-30 RX ORDER — METOPROLOL SUCCINATE 50 MG/1
TABLET, EXTENDED RELEASE ORAL
Qty: 270 TABLET | Refills: 11 | Status: SHIPPED | OUTPATIENT
Start: 2018-11-30 | End: 2020-05-04

## 2019-02-11 ENCOUNTER — TELEPHONE (OUTPATIENT)
Dept: INTERNAL MEDICINE | Facility: CLINIC | Age: 84
End: 2019-02-11

## 2019-02-11 NOTE — TELEPHONE ENCOUNTER
----- Message from Mariza Roberson sent at 2/9/2019  9:28 AM CST -----  Contact: Pts daughter, Elisa Pérez is calling to inform you that pt is having swelling in feet and legs and would like to be advised.    She can be reached at [phone]

## 2019-02-11 NOTE — TELEPHONE ENCOUNTER
Hi, please offer her an appt, she is due to see me and that is how I can best evaluate her leg swelling.  Thank you, Chago Jones

## 2019-02-27 ENCOUNTER — LAB VISIT (OUTPATIENT)
Dept: LAB | Facility: HOSPITAL | Age: 84
End: 2019-02-27
Attending: INTERNAL MEDICINE
Payer: MEDICARE

## 2019-02-27 ENCOUNTER — IMMUNIZATION (OUTPATIENT)
Dept: INTERNAL MEDICINE | Facility: CLINIC | Age: 84
End: 2019-02-27
Payer: MEDICARE

## 2019-02-27 ENCOUNTER — IMMUNIZATION (OUTPATIENT)
Dept: PHARMACY | Facility: CLINIC | Age: 84
End: 2019-02-27
Payer: MEDICARE

## 2019-02-27 ENCOUNTER — OFFICE VISIT (OUTPATIENT)
Dept: INTERNAL MEDICINE | Facility: CLINIC | Age: 84
End: 2019-02-27
Payer: MEDICARE

## 2019-02-27 VITALS — SYSTOLIC BLOOD PRESSURE: 128 MMHG | HEART RATE: 68 BPM | OXYGEN SATURATION: 99 % | DIASTOLIC BLOOD PRESSURE: 82 MMHG

## 2019-02-27 DIAGNOSIS — R73.09 ELEVATED HEMOGLOBIN A1C: ICD-10-CM

## 2019-02-27 DIAGNOSIS — I10 ESSENTIAL HYPERTENSION: Chronic | ICD-10-CM

## 2019-02-27 DIAGNOSIS — I48.20 CHRONIC ATRIAL FIBRILLATION: Chronic | ICD-10-CM

## 2019-02-27 DIAGNOSIS — L60.3 DYSTROPHIC NAIL: ICD-10-CM

## 2019-02-27 DIAGNOSIS — R60.0 BILATERAL EDEMA OF LOWER EXTREMITY: Chronic | ICD-10-CM

## 2019-02-27 DIAGNOSIS — I50.22 SYSTOLIC CHF, CHRONIC: Chronic | ICD-10-CM

## 2019-02-27 DIAGNOSIS — Z79.01 LONG TERM (CURRENT) USE OF ANTICOAGULANTS: Chronic | ICD-10-CM

## 2019-02-27 DIAGNOSIS — L97.211 SKIN ULCER OF RIGHT CALF, LIMITED TO BREAKDOWN OF SKIN: ICD-10-CM

## 2019-02-27 DIAGNOSIS — K21.9 GASTROESOPHAGEAL REFLUX DISEASE, ESOPHAGITIS PRESENCE NOT SPECIFIED: Primary | ICD-10-CM

## 2019-02-27 LAB
ALBUMIN SERPL BCP-MCNC: 3.6 G/DL
ALP SERPL-CCNC: 75 U/L
ALT SERPL W/O P-5'-P-CCNC: 6 U/L
ANION GAP SERPL CALC-SCNC: 7 MMOL/L
AST SERPL-CCNC: 19 U/L
BASOPHILS # BLD AUTO: 0.03 K/UL
BASOPHILS NFR BLD: 0.6 %
BILIRUB SERPL-MCNC: 0.9 MG/DL
BUN SERPL-MCNC: 15 MG/DL
CALCIUM SERPL-MCNC: 10.1 MG/DL
CHLORIDE SERPL-SCNC: 105 MMOL/L
CHOLEST SERPL-MCNC: 150 MG/DL
CHOLEST/HDLC SERPL: 3.7 {RATIO}
CO2 SERPL-SCNC: 29 MMOL/L
CREAT SERPL-MCNC: 1 MG/DL
DIFFERENTIAL METHOD: ABNORMAL
EOSINOPHIL # BLD AUTO: 0.2 K/UL
EOSINOPHIL NFR BLD: 3.5 %
ERYTHROCYTE [DISTWIDTH] IN BLOOD BY AUTOMATED COUNT: 15.6 %
EST. GFR  (AFRICAN AMERICAN): 59.4 ML/MIN/1.73 M^2
EST. GFR  (NON AFRICAN AMERICAN): 51.5 ML/MIN/1.73 M^2
ESTIMATED AVG GLUCOSE: 94 MG/DL
GLUCOSE SERPL-MCNC: 102 MG/DL
HBA1C MFR BLD HPLC: 4.9 %
HCT VFR BLD AUTO: 33.1 %
HDLC SERPL-MCNC: 41 MG/DL
HDLC SERPL: 27.3 %
HGB BLD-MCNC: 10.2 G/DL
LDLC SERPL CALC-MCNC: 91.6 MG/DL
LYMPHOCYTES # BLD AUTO: 0.9 K/UL
LYMPHOCYTES NFR BLD: 18 %
MCH RBC QN AUTO: 29.5 PG
MCHC RBC AUTO-ENTMCNC: 30.8 G/DL
MCV RBC AUTO: 96 FL
MONOCYTES # BLD AUTO: 0.5 K/UL
MONOCYTES NFR BLD: 9.6 %
NEUTROPHILS # BLD AUTO: 3.3 K/UL
NEUTROPHILS NFR BLD: 68.1 %
NONHDLC SERPL-MCNC: 109 MG/DL
PLATELET # BLD AUTO: 193 K/UL
PMV BLD AUTO: 10.4 FL
POTASSIUM SERPL-SCNC: 3.8 MMOL/L
PROT SERPL-MCNC: 7.4 G/DL
RBC # BLD AUTO: 3.46 M/UL
SODIUM SERPL-SCNC: 141 MMOL/L
TRIGL SERPL-MCNC: 87 MG/DL
WBC # BLD AUTO: 4.79 K/UL

## 2019-02-27 PROCEDURE — 99499 RISK ADDL DX/OHS AUDIT: ICD-10-PCS | Mod: S$GLB,,, | Performed by: INTERNAL MEDICINE

## 2019-02-27 PROCEDURE — 80061 LIPID PANEL: CPT

## 2019-02-27 PROCEDURE — 90662 FLU VACCINE - HIGH DOSE (65+) PRESERVATIVE FREE IM: ICD-10-PCS | Mod: S$GLB,,, | Performed by: INTERNAL MEDICINE

## 2019-02-27 PROCEDURE — 1101F PT FALLS ASSESS-DOCD LE1/YR: CPT | Mod: CPTII,S$GLB,, | Performed by: INTERNAL MEDICINE

## 2019-02-27 PROCEDURE — 85025 COMPLETE CBC W/AUTO DIFF WBC: CPT

## 2019-02-27 PROCEDURE — 36415 COLL VENOUS BLD VENIPUNCTURE: CPT

## 2019-02-27 PROCEDURE — 90662 IIV NO PRSV INCREASED AG IM: CPT | Mod: S$GLB,,, | Performed by: INTERNAL MEDICINE

## 2019-02-27 PROCEDURE — G0008 FLU VACCINE - HIGH DOSE (65+) PRESERVATIVE FREE IM: ICD-10-PCS | Mod: S$GLB,,, | Performed by: INTERNAL MEDICINE

## 2019-02-27 PROCEDURE — 99999 PR PBB SHADOW E&M-EST. PATIENT-LVL IV: CPT | Mod: PBBFAC,,, | Performed by: INTERNAL MEDICINE

## 2019-02-27 PROCEDURE — 83036 HEMOGLOBIN GLYCOSYLATED A1C: CPT

## 2019-02-27 PROCEDURE — 80053 COMPREHEN METABOLIC PANEL: CPT

## 2019-02-27 PROCEDURE — 3079F PR MOST RECENT DIASTOLIC BLOOD PRESSURE 80-89 MM HG: ICD-10-PCS | Mod: CPTII,S$GLB,, | Performed by: INTERNAL MEDICINE

## 2019-02-27 PROCEDURE — 99214 PR OFFICE/OUTPT VISIT, EST, LEVL IV, 30-39 MIN: ICD-10-PCS | Mod: S$GLB,,, | Performed by: INTERNAL MEDICINE

## 2019-02-27 PROCEDURE — 3079F DIAST BP 80-89 MM HG: CPT | Mod: CPTII,S$GLB,, | Performed by: INTERNAL MEDICINE

## 2019-02-27 PROCEDURE — 1101F PR PT FALLS ASSESS DOC 0-1 FALLS W/OUT INJ PAST YR: ICD-10-PCS | Mod: CPTII,S$GLB,, | Performed by: INTERNAL MEDICINE

## 2019-02-27 PROCEDURE — 99214 OFFICE O/P EST MOD 30 MIN: CPT | Mod: S$GLB,,, | Performed by: INTERNAL MEDICINE

## 2019-02-27 PROCEDURE — G0008 ADMIN INFLUENZA VIRUS VAC: HCPCS | Mod: S$GLB,,, | Performed by: INTERNAL MEDICINE

## 2019-02-27 PROCEDURE — 99999 PR PBB SHADOW E&M-EST. PATIENT-LVL IV: ICD-10-PCS | Mod: PBBFAC,,, | Performed by: INTERNAL MEDICINE

## 2019-02-27 PROCEDURE — 99499 UNLISTED E&M SERVICE: CPT | Mod: S$GLB,,, | Performed by: INTERNAL MEDICINE

## 2019-02-27 PROCEDURE — 3074F PR MOST RECENT SYSTOLIC BLOOD PRESSURE < 130 MM HG: ICD-10-PCS | Mod: CPTII,S$GLB,, | Performed by: INTERNAL MEDICINE

## 2019-02-27 PROCEDURE — 3074F SYST BP LT 130 MM HG: CPT | Mod: CPTII,S$GLB,, | Performed by: INTERNAL MEDICINE

## 2019-02-27 RX ORDER — OMEPRAZOLE 40 MG/1
40 CAPSULE, DELAYED RELEASE ORAL EVERY MORNING
Qty: 30 CAPSULE | Refills: 11 | Status: SHIPPED | OUTPATIENT
Start: 2019-02-27 | End: 2020-05-04

## 2019-02-27 NOTE — PROGRESS NOTES
Subjective:       Patient ID: Celina Parra is a 85 y.o. female.    Chief Complaint: Leg Swelling    Patient is here for followup for chronic conditions.    Has recent burst blister RLE. Chronic worsened swelling bilat lower legs.    Feels like generic Nexium does not help acid reflux. So self dced. Has chronic poorly controlled acid reflux, feels like food sticking when she swallows, denies n/v. Has chronic nasal congestion as well which may be from acid reflux. Good appetite, bowels are normal as well.    Eyes are itchy and watery.    Since last yr, no other doctor appts, ED visits, new medical issues.    Has good med adh.      Review of Systems   Constitutional: Negative for activity change, appetite change, diaphoresis, fatigue, fever and unexpected weight change.   HENT: Positive for congestion.    Eyes: Positive for itching.   Respiratory: Negative for chest tightness and wheezing.    Cardiovascular: Positive for leg swelling (worsened with weeping). Negative for chest pain and palpitations.   Gastrointestinal: Negative for abdominal distention, abdominal pain, blood in stool, constipation, diarrhea, nausea and vomiting.        Some dysphagia, not severe, no odyno   Genitourinary: Negative for decreased urine volume and difficulty urinating.        Has large volume noc time incont   Musculoskeletal: Positive for arthralgias and gait problem. Negative for back pain, joint swelling, neck pain and neck stiffness.   Skin: Positive for rash (bilat lower legs) and wound (R posterior calf).   Neurological: Positive for weakness. Negative for syncope and headaches.   Hematological: Bruises/bleeds easily.   Psychiatric/Behavioral: Negative for confusion.       Objective:      Physical Exam   Constitutional: She is oriented to person, place, and time. She appears well-developed and well-nourished. No distress.   Not toxic appearing.  Very weak and difficult even to stand to have wt checked   HENT:   Head: Normocephalic  and atraumatic.   Mouth/Throat: No oropharyngeal exudate.   Sinuses nontender   Eyes: Pupils are equal, round, and reactive to light. No scleral icterus.   Cardiovascular: Normal rate.   No murmur heard.  Irregularly irregular     Pulmonary/Chest: Effort normal and breath sounds normal.   Abdominal: Soft. Bowel sounds are normal. She exhibits no distension and no mass. There is no tenderness. There is no rebound and no guarding. No hernia.   Musculoskeletal:   bilat very swollen legs with chronic venous stasis and open wound R posterior calf.    Feet with onychogryphosis in nails   Neurological: She is alert and oriented to person, place, and time.   Unable to weight bear alone, bilat prox muscles 4/5 strength to resistant testing.       Skin: Skin is warm and dry. She is not diaphoretic.   Open open, superficial R posterior calf, no redness or warmth surrounding and no purulence.   Psychiatric: She has a normal mood and affect. Her behavior is normal.       Assessment:       1. Gastroesophageal reflux disease, esophagitis presence not specified    2. Encounter for long-term (current) use of anticoagulants    3. Systolic CHF, chronic    4. Essential hypertension    5. Bilateral edema of lower extremity    6. Skin ulcer of right calf, limited to breakdown of skin    7. Dystrophic nail    8. Chronic atrial fibrillation    9. Elevated hemoglobin A1c        Plan:       Celina was seen today for leg swelling.    Diagnoses and all orders for this visit:    Gastroesophageal reflux disease, esophagitis presence not specified  -     omeprazole (PRILOSEC) 40 MG capsule; Take 1 capsule (40 mg total) by mouth every morning. Take 30 minutes before eating on an empty stomach  I hope this will help dysphagia and nasal congestion, if not call me back. She previous saw GI at .    Encounter for long-term (current) use of anticoagulants  Good xalelto med adh    Systolic CHF, chronic  -     CBC auto differential; Future  -      Comprehensive metabolic panel; Future  -     Lipid panel; Future  Volume overloaded with fluid in legs    Essential hypertension  controlled    Bilateral edema of lower extremity    Skin ulcer of right calf, limited to breakdown of skin  -     Ambulatory referral to Home Health  -     Ambulatory referral to Palliative Care - [facility]  -     Ambulatory referral to Podiatry  Offered wound clinic at [facility], she declines for now    Dystrophic nail  -     Ambulatory referral to Podiatry    Chronic atrial fibrillation  Good rate control, on xarelto    Elevated hemoglobin A1c  -     Hemoglobin A1c; Future    [employer] case management for home assistance, coverage for personal health hygiene.    Health Maintenance       Date Due Completion Date    TETANUS VACCINE [birth year]/1951 ---    DEXA SCAN 05/28/1973 ---    Zoster Vaccine 05/28/1993 ---    Influenza Vaccine 08/01/2018 11/15/2017    Lipid Panel 09/23/2020 9/23/2015      tdap and flu vax.    Follow-up in about 3 months (around 5/27/2019).    No future appointments.    Call Nicole

## 2019-04-09 DIAGNOSIS — I48.20 CHRONIC ATRIAL FIBRILLATION: ICD-10-CM

## 2019-04-11 RX ORDER — RIVAROXABAN 20 MG/1
TABLET, FILM COATED ORAL
Qty: 90 TABLET | Refills: 3 | Status: SHIPPED | OUTPATIENT
Start: 2019-04-11 | End: 2020-04-01

## 2019-05-13 ENCOUNTER — TELEPHONE (OUTPATIENT)
Dept: INTERNAL MEDICINE | Facility: CLINIC | Age: 84
End: 2019-05-13

## 2019-05-13 DIAGNOSIS — L97.211 SKIN ULCER OF RIGHT CALF, LIMITED TO BREAKDOWN OF SKIN: ICD-10-CM

## 2019-05-13 DIAGNOSIS — I50.22 SYSTOLIC CHF, CHRONIC: Primary | ICD-10-CM

## 2019-05-13 DIAGNOSIS — R60.0 BILATERAL EDEMA OF LOWER EXTREMITY: ICD-10-CM

## 2019-05-13 DIAGNOSIS — I48.20 CHRONIC ATRIAL FIBRILLATION: ICD-10-CM

## 2019-05-13 NOTE — TELEPHONE ENCOUNTER
----- Message from Tessie Herrera sent at 5/13/2019  2:54 PM CDT -----  Contact: Tobin/Daughter/[phone]  Tobin called in regards needing to talk with Dr Jones about the swelling of her legs. And request for a nurse to visit patient at home, insurance has not receive the request. Please call and advise. Thank you

## 2019-05-13 NOTE — TELEPHONE ENCOUNTER
Pt has swelling on both legs and feet with blisters. Pt daughter wants to know what she should do since the blisters are now leaking. Daughter would also like to know what is the status of Home Health coming out as she hasn't heard from any company as of yet.

## 2019-05-14 NOTE — TELEPHONE ENCOUNTER
Hi, please fax to [facility] the home health orders, I will print them out.    Future Appointments   Date Time Provider Department Center   5/29/2019  3:00 PM Chago Jones MD [facility] IM [facility]     For the swelling I recommend that she take the lasix 40mg twice per day instead of once, please let daughter know.    Please remind daughter that she has an appt with me, see above date.    Let me know if patient has any questions.  Thank you, Chago Jones

## 2019-05-22 ENCOUNTER — TELEPHONE (OUTPATIENT)
Dept: INTERNAL MEDICINE | Facility: CLINIC | Age: 84
End: 2019-05-22

## 2019-05-22 DIAGNOSIS — R60.0 BILATERAL EDEMA OF LOWER EXTREMITY: ICD-10-CM

## 2019-05-22 DIAGNOSIS — Z86.73 H/O: CVA (CEREBROVASCULAR ACCIDENT): ICD-10-CM

## 2019-05-22 DIAGNOSIS — L97.211 SKIN ULCER OF RIGHT CALF, LIMITED TO BREAKDOWN OF SKIN: ICD-10-CM

## 2019-05-22 DIAGNOSIS — I50.22 SYSTOLIC CHF, CHRONIC: Primary | ICD-10-CM

## 2019-05-22 NOTE — TELEPHONE ENCOUNTER
----- Message from Edgar Zhang sent at 5/22/2019  7:52 AM CDT -----  Contact: Chana/[employer]/[phone]   Office is calling to speak with someone in the office in regards to the orders for home health that they've received along with documents needed to complete orders. They state that they need to speak with someone in regards to the clinical notes. They need recent clinical notes to complete the orders for home health. She states that its very important that she receives a call back today because this is very time sensitive. Please call and advise.        Thank You

## 2019-05-22 NOTE — TELEPHONE ENCOUNTER
Spoke with Chana from [employer] she stated that the notes from the February visit does not address PT/OT. She stated the order from May 14th has wound care ordered but that's not in the cart notes either. She asked if you would like to send out a skilled nurse to go out and evaluate patient for wound because she doesn't know if it would be good to wait till next week, since patient has an appointment next week. She said if you are going to wait to put in the PT/OT when you see patient then you have to cancel the current orders. Would like a call back today or tomorrow morning at the earliest on what you would like to do.

## 2019-05-22 NOTE — TELEPHONE ENCOUNTER
Hi, please send a copy of her last office visit with me in February. I ordered home health at that time and I do not think she ever received it.  Thank you, Chago Jones

## 2019-05-22 NOTE — TELEPHONE ENCOUNTER
----- Message from Robyn Zendejas sent at 5/21/2019  2:54 PM CDT -----  Contact: [facility] [place]/[phone]  Rep called in regard to getting  clarification on clinical notes so the pt can get skilled nurse, physical therapy, occupational therapy, home health aid. The information that was faxed to the insurance company is not current. She wanted to know if she has most recent clinical notes. She would like a call back.  Fax [phone]      Please advise

## 2019-05-23 NOTE — TELEPHONE ENCOUNTER
Hi,  Yes please send a nurse home evaluation, I will write a new order.  Please call the patient's daughter and remind her that she has an appt to see me next week.    Please fax this order to [employer] --  Orders Placed This Encounter    Ambulatory referral to Home Health       Thank you, Chago Jones

## 2019-05-29 ENCOUNTER — OFFICE VISIT (OUTPATIENT)
Dept: INTERNAL MEDICINE | Facility: CLINIC | Age: 84
End: 2019-05-29
Payer: MEDICARE

## 2019-05-29 VITALS
HEART RATE: 53 BPM | BODY MASS INDEX: 38.63 KG/M2 | SYSTOLIC BLOOD PRESSURE: 110 MMHG | OXYGEN SATURATION: 97 % | HEIGHT: 65 IN | DIASTOLIC BLOOD PRESSURE: 70 MMHG

## 2019-05-29 DIAGNOSIS — E66.01 MORBID (SEVERE) OBESITY DUE TO EXCESS CALORIES: ICD-10-CM

## 2019-05-29 DIAGNOSIS — I50.22 SYSTOLIC CHF, CHRONIC: Chronic | ICD-10-CM

## 2019-05-29 DIAGNOSIS — L60.3 DYSTROPHIC NAIL: ICD-10-CM

## 2019-05-29 DIAGNOSIS — L97.211 SKIN ULCER OF RIGHT CALF, LIMITED TO BREAKDOWN OF SKIN: ICD-10-CM

## 2019-05-29 DIAGNOSIS — R60.0 BILATERAL EDEMA OF LOWER EXTREMITY: Primary | ICD-10-CM

## 2019-05-29 DIAGNOSIS — I10 ESSENTIAL HYPERTENSION: Chronic | ICD-10-CM

## 2019-05-29 PROCEDURE — 99999 PR PBB SHADOW E&M-EST. PATIENT-LVL IV: CPT | Mod: PBBFAC,,, | Performed by: INTERNAL MEDICINE

## 2019-05-29 PROCEDURE — 1101F PT FALLS ASSESS-DOCD LE1/YR: CPT | Mod: CPTII,S$GLB,, | Performed by: INTERNAL MEDICINE

## 2019-05-29 PROCEDURE — 1101F PR PT FALLS ASSESS DOC 0-1 FALLS W/OUT INJ PAST YR: ICD-10-PCS | Mod: CPTII,S$GLB,, | Performed by: INTERNAL MEDICINE

## 2019-05-29 PROCEDURE — 99215 OFFICE O/P EST HI 40 MIN: CPT | Mod: S$GLB,,, | Performed by: INTERNAL MEDICINE

## 2019-05-29 PROCEDURE — 99499 RISK ADDL DX/OHS AUDIT: ICD-10-PCS | Mod: S$GLB,,, | Performed by: INTERNAL MEDICINE

## 2019-05-29 PROCEDURE — 99999 PR PBB SHADOW E&M-EST. PATIENT-LVL IV: ICD-10-PCS | Mod: PBBFAC,,, | Performed by: INTERNAL MEDICINE

## 2019-05-29 PROCEDURE — 99499 UNLISTED E&M SERVICE: CPT | Mod: S$GLB,,, | Performed by: INTERNAL MEDICINE

## 2019-05-29 PROCEDURE — 99215 PR OFFICE/OUTPT VISIT, EST, LEVL V, 40-54 MIN: ICD-10-PCS | Mod: S$GLB,,, | Performed by: INTERNAL MEDICINE

## 2019-05-29 RX ORDER — FLUTICASONE PROPIONATE 50 MCG
SPRAY, SUSPENSION (ML) NASAL
COMMUNITY
Start: 2019-05-13 | End: 2020-05-04 | Stop reason: SDUPTHER

## 2019-05-29 RX ORDER — FUROSEMIDE 40 MG/1
80 TABLET ORAL DAILY
Qty: 180 TABLET | Refills: 11 | Status: SHIPPED | OUTPATIENT
Start: 2019-05-29 | End: 2019-06-13

## 2019-05-29 NOTE — PROGRESS NOTES
Subjective:       Patient ID: Celina Parra is a 86 y.o. female.    Chief Complaint: Follow-up (3 month)    Patient is here for followup for chronic conditions.    Chronic pills feeling stuck. No wt loss, no diff swallowing. Good appetite.    Cough med syrup makes her too drowsy.    Main issue is the bilat lower leg sores and blisters, has skin breakdown on LLE. No deep ulcers or pus expressed. No surrounding redness or warmth. Home nurse is now helping treat the sore.    Still lives alone, gets dressed alone and prepares small meals until dtr brings dinner. dtr watch the sodium in her diet, but she does eat 8-10 slices bread per day (1200mg sodium around).    Review of Systems   Constitutional: Negative for activity change, appetite change, diaphoresis, fatigue, fever and unexpected weight change.   HENT: Positive for congestion.    Eyes: Positive for itching.   Respiratory: Negative for chest tightness and wheezing.    Cardiovascular: Positive for leg swelling (worsened with weeping). Negative for chest pain and palpitations.   Gastrointestinal: Negative for abdominal distention, abdominal pain, blood in stool, constipation, diarrhea, nausea and vomiting.        Some dysphagia, not severe, no odyno   Genitourinary: Negative for decreased urine volume and difficulty urinating.        Has large volume noc time incont   Musculoskeletal: Positive for arthralgias and gait problem. Negative for back pain, joint swelling, neck pain and neck stiffness.   Skin: Positive for rash (bilat lower legs) and wound (bilat anterior lower legs, open wounds).   Neurological: Positive for weakness. Negative for syncope and headaches.   Hematological: Bruises/bleeds easily.   Psychiatric/Behavioral: Negative for confusion.       Objective:      Physical Exam   Constitutional: She is oriented to person, place, and time. She appears well-developed and well-nourished. No distress.   Not toxic appearing.  Very weak and difficult even to  stand to have wt checked   HENT:   Head: Normocephalic and atraumatic.   Mouth/Throat: No oropharyngeal exudate.   Sinuses nontender   Eyes: Pupils are equal, round, and reactive to light. No scleral icterus.   Cardiovascular: Normal rate.   No murmur heard.  Irregularly irregular     Pulmonary/Chest: Effort normal and breath sounds normal.   Abdominal: Soft. Bowel sounds are normal. She exhibits no distension and no mass. There is no tenderness. There is no rebound and no guarding. No hernia.   Musculoskeletal:   bilat very swollen legs (3+ with pitting and weeping) with chronic venous stasis and open wounds bilat lower legs.  No purulence or redness    Feet with onychogryphosis in nails   Neurological: She is alert and oriented to person, place, and time.   Unable to weight bear alone, bilat prox muscles 4/5 strength to resistant testing.       Skin: Skin is warm and dry. She is not diaphoretic.   Psychiatric: She has a normal mood and affect. Her behavior is normal.       Assessment:       1. Bilateral edema of lower extremity    2. Systolic CHF, chronic    3. Essential hypertension    4. Skin ulcer of right calf, limited to breakdown of skin    5. Dystrophic nail        Plan:       Celina was seen today for follow-up.    Diagnoses and all orders for this visit:    Bilateral edema of lower extremity  -     Ambulatory Referral to Wound Clinic  -     Ambulatory referral to Podiatry    Systolic CHF, chronic  -     furosemide (LASIX) 40 MG tablet; Take 2 tablets (80 mg total) by mouth once daily.  -     Basic metabolic panel; Future  -     Brain natriuretic peptide; Future  Offered cardiology she declines, focus for now on feet and leg wounds  Patient Instructions   No more than 4 slices per day of bunny bread    Morbid obesity -- work on less sodium, eat btr    Essential hypertension  -     furosemide (LASIX) 40 MG tablet; Take 2 tablets (80 mg total) by mouth once daily.  She ran out early since did not have  increased dose    Skin ulcer of right calf, limited to breakdown of skin  -     Ambulatory Referral to Wound Clinic  -     Ambulatory referral to Podiatry    Dystrophic nail  -     Ambulatory referral to Podiatry    Over 40 minutes spent with patient and majority in counseling and patient education.      Health Maintenance       Date Due Completion Date    Influenza Vaccine 08/01/2019 2/27/2019    Lipid Panel 02/27/2024 2/27/2019    TETANUS VACCINE 02/27/2029 2/27/2019          Follow up in about 3 months (around 8/29/2019).    Future Appointments   Date Time Provider Department Center   6/12/2019  3:00 PM Bartolome Ojeda DPM [facility] POD [facility]   6/26/2019  3:00 PM Maxine Downey NP [facility] WOUND [facility]   8/28/2019  3:20 PM [facility]

## 2019-06-03 ENCOUNTER — EXTERNAL HOME HEALTH (OUTPATIENT)
Dept: HOME HEALTH SERVICES | Facility: HOSPITAL | Age: 84
End: 2019-06-03

## 2019-06-12 ENCOUNTER — TELEPHONE (OUTPATIENT)
Dept: PODIATRY | Facility: CLINIC | Age: 84
End: 2019-06-12

## 2019-06-12 ENCOUNTER — OFFICE VISIT (OUTPATIENT)
Dept: INTERNAL MEDICINE | Facility: CLINIC | Age: 84
End: 2019-06-12
Payer: MEDICARE

## 2019-06-12 ENCOUNTER — OFFICE VISIT (OUTPATIENT)
Dept: PODIATRY | Facility: CLINIC | Age: 84
End: 2019-06-12
Payer: MEDICARE

## 2019-06-12 ENCOUNTER — HOSPITAL ENCOUNTER (EMERGENCY)
Facility: HOSPITAL | Age: 84
Discharge: HOME | End: 2019-06-13
Attending: EMERGENCY MEDICINE
Payer: MEDICARE

## 2019-06-12 VITALS
WEIGHT: 232 LBS | HEART RATE: 72 BPM | BODY MASS INDEX: 38.65 KG/M2 | HEIGHT: 65 IN | HEIGHT: 65 IN | SYSTOLIC BLOOD PRESSURE: 130 MMHG | HEART RATE: 74 BPM | DIASTOLIC BLOOD PRESSURE: 88 MMHG | BODY MASS INDEX: 38.61 KG/M2 | DIASTOLIC BLOOD PRESSURE: 80 MMHG | SYSTOLIC BLOOD PRESSURE: 155 MMHG | TEMPERATURE: 98 F

## 2019-06-12 DIAGNOSIS — E66.9 OBESITY (BMI 30-39.9): ICD-10-CM

## 2019-06-12 DIAGNOSIS — B35.1 ONYCHOMYCOSIS DUE TO DERMATOPHYTE: Primary | ICD-10-CM

## 2019-06-12 DIAGNOSIS — L97.923: Primary | ICD-10-CM

## 2019-06-12 DIAGNOSIS — L97.922 ULCER OF LEFT LOWER EXTREMITY WITH FAT LAYER EXPOSED: Primary | ICD-10-CM

## 2019-06-12 DIAGNOSIS — R60.0 PERIPHERAL EDEMA: ICD-10-CM

## 2019-06-12 DIAGNOSIS — Z79.01 LONG TERM (CURRENT) USE OF ANTICOAGULANTS: ICD-10-CM

## 2019-06-12 DIAGNOSIS — L97.921 ULCER OF LEFT LOWER EXTREMITY, LIMITED TO BREAKDOWN OF SKIN: ICD-10-CM

## 2019-06-12 LAB
ALBUMIN SERPL BCP-MCNC: 3.6 G/DL (ref 3.5–5.2)
ALP SERPL-CCNC: 95 U/L (ref 55–135)
ALT SERPL W/O P-5'-P-CCNC: 5 U/L (ref 10–44)
ANION GAP SERPL CALC-SCNC: 9 MMOL/L (ref 8–16)
AST SERPL-CCNC: 20 U/L (ref 10–40)
BASOPHILS # BLD AUTO: 0.03 K/UL (ref 0–0.2)
BASOPHILS NFR BLD: 0.5 % (ref 0–1.9)
BILIRUB SERPL-MCNC: 1.2 MG/DL (ref 0.1–1)
BUN SERPL-MCNC: 22 MG/DL (ref 8–23)
CALCIUM SERPL-MCNC: 10.5 MG/DL (ref 8.7–10.5)
CHLORIDE SERPL-SCNC: 103 MMOL/L (ref 95–110)
CO2 SERPL-SCNC: 29 MMOL/L (ref 23–29)
CREAT SERPL-MCNC: 1.1 MG/DL (ref 0.5–1.4)
DIFFERENTIAL METHOD: ABNORMAL
EOSINOPHIL # BLD AUTO: 0.1 K/UL (ref 0–0.5)
EOSINOPHIL NFR BLD: 2.4 % (ref 0–8)
ERYTHROCYTE [DISTWIDTH] IN BLOOD BY AUTOMATED COUNT: 17.3 % (ref 11.5–14.5)
EST. GFR  (AFRICAN AMERICAN): 52.5 ML/MIN/1.73 M^2
EST. GFR  (NON AFRICAN AMERICAN): 45.6 ML/MIN/1.73 M^2
GLUCOSE SERPL-MCNC: 94 MG/DL (ref 70–110)
HCT VFR BLD AUTO: 34.6 % (ref 37–48.5)
HGB BLD-MCNC: 10.2 G/DL (ref 12–16)
IMM GRANULOCYTES # BLD AUTO: 0.01 K/UL (ref 0–0.04)
IMM GRANULOCYTES NFR BLD AUTO: 0.2 % (ref 0–0.5)
LACTATE SERPL-SCNC: 1.2 MMOL/L (ref 0.5–2.2)
LYMPHOCYTES # BLD AUTO: 1.3 K/UL (ref 1–4.8)
LYMPHOCYTES NFR BLD: 21.5 % (ref 18–48)
MCH RBC QN AUTO: 26.9 PG (ref 27–31)
MCHC RBC AUTO-ENTMCNC: 29.5 G/DL (ref 32–36)
MCV RBC AUTO: 91 FL (ref 82–98)
MONOCYTES # BLD AUTO: 0.6 K/UL (ref 0.3–1)
MONOCYTES NFR BLD: 10.1 % (ref 4–15)
NEUTROPHILS # BLD AUTO: 3.8 K/UL (ref 1.8–7.7)
NEUTROPHILS NFR BLD: 65.3 % (ref 38–73)
NRBC BLD-RTO: 0 /100 WBC
PLATELET # BLD AUTO: 262 K/UL (ref 150–350)
PMV BLD AUTO: 11.4 FL (ref 9.2–12.9)
POTASSIUM SERPL-SCNC: 4 MMOL/L (ref 3.5–5.1)
PROT SERPL-MCNC: 8.2 G/DL (ref 6–8.4)
RBC # BLD AUTO: 3.79 M/UL (ref 4–5.4)
SODIUM SERPL-SCNC: 141 MMOL/L (ref 136–145)
WBC # BLD AUTO: 5.87 K/UL (ref 3.9–12.7)

## 2019-06-12 PROCEDURE — 99284 EMERGENCY DEPT VISIT MOD MDM: CPT | Mod: ,,, | Performed by: EMERGENCY MEDICINE

## 2019-06-12 PROCEDURE — 99284 PR EMERGENCY DEPT VISIT,LEVEL IV: ICD-10-PCS | Mod: ,,, | Performed by: EMERGENCY MEDICINE

## 2019-06-12 PROCEDURE — 1101F PR PT FALLS ASSESS DOC 0-1 FALLS W/OUT INJ PAST YR: ICD-10-PCS | Mod: CPTII,S$GLB,, | Performed by: PODIATRIST

## 2019-06-12 PROCEDURE — 99203 PR OFFICE/OUTPT VISIT, NEW, LEVL III, 30-44 MIN: ICD-10-PCS | Mod: S$GLB,,, | Performed by: PODIATRIST

## 2019-06-12 PROCEDURE — 93010 ELECTROCARDIOGRAM REPORT: CPT | Mod: ,,, | Performed by: INTERNAL MEDICINE

## 2019-06-12 PROCEDURE — 83605 ASSAY OF LACTIC ACID: CPT

## 2019-06-12 PROCEDURE — 99499 RISK ADDL DX/OHS AUDIT: ICD-10-PCS | Mod: S$GLB,,, | Performed by: NURSE PRACTITIONER

## 2019-06-12 PROCEDURE — 93005 ELECTROCARDIOGRAM TRACING: CPT

## 2019-06-12 PROCEDURE — 99213 PR OFFICE/OUTPT VISIT, EST, LEVL III, 20-29 MIN: ICD-10-PCS | Mod: S$GLB,,, | Performed by: NURSE PRACTITIONER

## 2019-06-12 PROCEDURE — 99999 PR PBB SHADOW E&M-EST. PATIENT-LVL III: CPT | Mod: PBBFAC,,, | Performed by: PODIATRIST

## 2019-06-12 PROCEDURE — 1101F PT FALLS ASSESS-DOCD LE1/YR: CPT | Mod: CPTII,S$GLB,, | Performed by: PODIATRIST

## 2019-06-12 PROCEDURE — 83880 ASSAY OF NATRIURETIC PEPTIDE: CPT

## 2019-06-12 PROCEDURE — 99999 PR PBB SHADOW E&M-EST. PATIENT-LVL III: ICD-10-PCS | Mod: PBBFAC,,, | Performed by: PODIATRIST

## 2019-06-12 PROCEDURE — 99499 UNLISTED E&M SERVICE: CPT | Mod: S$GLB,,, | Performed by: NURSE PRACTITIONER

## 2019-06-12 PROCEDURE — 99499 UNLISTED E&M SERVICE: CPT | Mod: S$GLB,,, | Performed by: PODIATRIST

## 2019-06-12 PROCEDURE — 80053 COMPREHEN METABOLIC PANEL: CPT

## 2019-06-12 PROCEDURE — 99284 EMERGENCY DEPT VISIT MOD MDM: CPT | Mod: 25

## 2019-06-12 PROCEDURE — 85025 COMPLETE CBC W/AUTO DIFF WBC: CPT

## 2019-06-12 PROCEDURE — 99499 RISK ADDL DX/OHS AUDIT: ICD-10-PCS | Mod: S$GLB,,, | Performed by: PODIATRIST

## 2019-06-12 PROCEDURE — 99213 OFFICE O/P EST LOW 20 MIN: CPT | Mod: S$GLB,,, | Performed by: NURSE PRACTITIONER

## 2019-06-12 PROCEDURE — 99999 PR PBB SHADOW E&M-EST. PATIENT-LVL IV: ICD-10-PCS | Mod: PBBFAC,,, | Performed by: NURSE PRACTITIONER

## 2019-06-12 PROCEDURE — 1101F PT FALLS ASSESS-DOCD LE1/YR: CPT | Mod: CPTII,S$GLB,, | Performed by: NURSE PRACTITIONER

## 2019-06-12 PROCEDURE — 99203 OFFICE O/P NEW LOW 30 MIN: CPT | Mod: S$GLB,,, | Performed by: PODIATRIST

## 2019-06-12 PROCEDURE — 99999 PR PBB SHADOW E&M-EST. PATIENT-LVL IV: CPT | Mod: PBBFAC,,, | Performed by: NURSE PRACTITIONER

## 2019-06-12 PROCEDURE — 93010 EKG 12-LEAD: ICD-10-PCS | Mod: ,,, | Performed by: INTERNAL MEDICINE

## 2019-06-12 PROCEDURE — 1101F PR PT FALLS ASSESS DOC 0-1 FALLS W/OUT INJ PAST YR: ICD-10-PCS | Mod: CPTII,S$GLB,, | Performed by: NURSE PRACTITIONER

## 2019-06-12 RX ORDER — CICLOPIROX 80 MG/ML
SOLUTION TOPICAL NIGHTLY
Qty: 6.6 ML | Refills: 11 | Status: ON HOLD | OUTPATIENT
Start: 2019-06-12 | End: 2022-01-01 | Stop reason: HOSPADM

## 2019-06-12 NOTE — PROGRESS NOTES
"Subjective:       Patient ID: Celina Parra is a 86 y.o. female.    Chief Complaint: Wound Check (both legs )    Pt of Dr. Jones, here for open wound on leg. Already addressed on 5-29-19 by PCP Dr. Jones, she has been referred to wound care and has been seen by them.    Seen by Podiatry prior to today's appt.    Daughter miguelito in room. [facility] Nurse changed her bandages earlier and told pt and her that she had bugs in her left shin wound and she needed to go to ER or see her doctor. Pt was late for appt today but we accommodated her since she had just left podiatry. There is a long standing hx of PVD. She was referred intially to wound care and podiatry by PCP Dr. Jones for would on her right calf. However the nurse noticed a wound on her left calf and cleaned it today. It has been draining and painful.    Review of Systems   Constitutional: Negative for chills and fever.   Respiratory: Negative for chest tightness and shortness of breath.    Cardiovascular: Positive for leg swelling. Negative for chest pain and palpitations.   Skin: Positive for color change and wound.        As documented in HPI     Allergic/Immunologic: Negative for environmental allergies, food allergies and immunocompromised state.   Neurological: Negative for weakness and numbness.       Objective:       Vitals:    06/12/19 1655   BP: 130/80   Pulse: 72   Temp: 97.9 °F (36.6 °C)   TempSrc: Oral   Height: 5' 5" (1.651 m)   PainSc: 0-No pain       Body mass index is 38.61 kg/m².    Physical Exam   Constitutional: She appears well-developed.   Obese, in wheelchair, unkempt, reaking of urine   HENT:   Head: Normocephalic.   Eyes: Pupils are equal, round, and reactive to light.   Neck: Normal range of motion.   Cardiovascular: Normal rate, regular rhythm, normal heart sounds and intact distal pulses.   Pulmonary/Chest: Effort normal and breath sounds normal.   Musculoskeletal: She exhibits edema and tenderness.   In wheelchair see " photos below   Skin:   Wounds as shown below, bandages were saturated in urine on left leg   Psychiatric: She has a normal mood and affect. Her behavior is normal. Judgment and thought content normal.   Nursing note and vitals reviewed.      Left lower leg    Left posterior leg    Right leg        Assessment:       1. 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 BMI 38.0-38.9,adult    3. Obesity (BMI 30-39.9)        Plan:       Celina was seen today for wound check.    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    Diet and exercise to lose weight.

## 2019-06-12 NOTE — TELEPHONE ENCOUNTER
----- Message from Andrew Staples sent at 6/12/2019  3:08 PM CDT -----  Contact: Daughter - Ana Parra    Needs Advice    Reason for call: Pt still getting her wounds cleaned and will be approximately 10 - 15 mins late         Communication Preference: Phone     Additional Information: Please call if need to reschedule

## 2019-06-12 NOTE — LETTER
June 12, 2019      Chago Jones MD  [address]  [place] [address]           [facility] - Podiatry  [address]  [place] [address]  Phone: [phone]          Patient: Celina Parra   MR Number: [id number]   YOB: 1933   Date of Visit: 6/12/2019       Dear Dr. Chago Jones:    Thank you for referring Celina Parra to me for evaluation. Attached you will find relevant portions of my assessment and plan of care.    If you have questions, please do not hesitate to call me. I look forward to following Celina Parra along with you.    Sincerely,    Bartolome Ojeda, DPKATHARINE    Enclosure  CC:  No Recipients    If you would like to receive this communication electronically, please contact [email or web address] or [phone] to request more information on [employer] Link access.    For providers and/or their staff who would like to refer a patient to [facility], please contact us through our one-stop-shop provider referral line, [facility] , at [phone].    If you feel you have received this communication in error or would no longer like to receive these types of communications, please e-mail [email or web address]

## 2019-06-12 NOTE — PROGRESS NOTES
Subjective:      Patient ID: Celina Parra is a 86 y.o. female.    Chief Complaint: Peripheral Vascular Disease and Nail Care    Thick discolored misshapen toenails all ten toes.  Gradual onset, worsening over past several weeks, aggravated by increased weight bearing, shoe gear, pressure.  No previous medical treatment.  OTC pain med not helping. Denies trauma, surgery both feet.    Review of Systems   Constitution: Negative for chills, diaphoresis, fever, malaise/fatigue and night sweats.   Cardiovascular: Positive for leg swelling. Negative for claudication, cyanosis and syncope.   Skin: Positive for nail changes. Negative for color change, dry skin, rash, suspicious lesions and unusual hair distribution.   Musculoskeletal: Negative for falls, joint pain, joint swelling, muscle cramps, muscle weakness and stiffness.   Gastrointestinal: Negative for constipation, diarrhea, nausea and vomiting.   Neurological: Negative for brief paralysis, disturbances in coordination, focal weakness, numbness, paresthesias, sensory change and tremors.           Objective:      Physical Exam   Constitutional: She is oriented to person, place, and time. She appears well-developed and well-nourished. She is cooperative. No distress.   Cardiovascular:   Pulses:       Popliteal pulses are 2+ on the right side, and 2+ on the left side.        Dorsalis pedis pulses are 1+ on the right side, and 1+ on the left side.        Posterior tibial pulses are 1+ on the right side, and 1+ on the left side.   Capillary refill 3 seconds all toes/distal feet, all toes/both feet warm to touch.      Negative lymphadenopathy bilateral popliteal fossa and tarsal tunnel.     2+ pitting lower extremity edema bilateral.     Musculoskeletal:        Right ankle: She exhibits normal range of motion, no swelling, no ecchymosis, no deformity, no laceration and normal pulse. Achilles tendon normal. Achilles tendon exhibits no pain, no defect and normal Morse's  test results.   Normal angle, base, station of gait. All ten toes without clubbing, cyanosis, or signs of ischemia.  No pain to palpation bilateral lower extremities.  Range of motion, stability, muscle strength, and muscle tone normal bilateral feet and legs.    Lymphadenopathy: No inguinal adenopathy noted on the right or left side.   Negative lymphadenopathy bilateral popliteal fossa and tarsal tunnel.    Negative lymphangitic streaking bilateral feet/ankles/legs.   Neurological: She is alert and oriented to person, place, and time. She has normal strength. She displays no atrophy and no tremor. No sensory deficit. She exhibits normal muscle tone. Gait normal.   Reflex Scores:       Patellar reflexes are 2+ on the right side and 2+ on the left side.       Achilles reflexes are 2+ on the right side and 2+ on the left side.  Negative tinel sign to percussion sural, superficial peroneal, deep peroneal, saphenous, and posterior tibial nerves right and left ankles and feet.     Skin: Skin is warm, dry and intact. Capillary refill takes 2 to 3 seconds. No abrasion, no bruising, no burn, no ecchymosis, no laceration, no lesion and no rash noted. She is not diaphoretic. No cyanosis or erythema. No pallor. Nails show no clubbing.   Skin is normal age and health appropriate color, turgor, texture, and temperature bilateral lower extremities without ulceration, hyperpigmentation, discoloration, masses nodules or cords palpated.  No ecchymosis, erythema, edema, or cardinal signs of infection bilateral lower extremities.    Toenails 1st, 2nd, 3rd, 4th, 5th  bilateral are hypertrophic thickened 2-3 mm, dystrophic, discolored tanish brown with tan, gray crumbly subungual debris.  Tender to distal nail plate pressure, without periungual skin abnormality of each.       Psychiatric: She has a normal mood and affect.             Assessment:       Encounter Diagnoses   Name Primary?    Onychomycosis due to dermatophyte Yes    Long  term (current) use of anticoagulants          Plan:       Celina was seen today for peripheral vascular disease and nail care.    Diagnoses and all orders for this visit:    Onychomycosis due to dermatophyte    Long term (current) use of anticoagulants    Other orders  -     ciclopirox (PENLAC) 8 % Soln; Apply topically nightly.      I counseled the patient on her conditions, their implications and medical management.        - Shoe inspection. Patient instructed on proper foot hygeine. We discussed wearing proper shoe gear, daily foot inspections, never walking without protective shoe gear, never putting sharp instruments to feet, routine podiatric visits at least annually.    Discussed conservative treatment with shoes of adequate dimensions, material, and style to alleviate symptoms and delay or prevent surgical intervention.    Rx penlac    - With patient's permission, nails were aggressively reduced and debrided x 10 to their soft tissue attachment mechanically and with electric , removing all offending nail and debris. Patient relates relief following the procedure. She will continue to monitor the areas daily, inspect her feet, wear protective shoe gear when ambulatory, moisturizer to maintain skin integrity and follow in this office  p.r.n.          Follow up if symptoms worsen or fail to improve.

## 2019-06-13 ENCOUNTER — TELEPHONE (OUTPATIENT)
Dept: INTERNAL MEDICINE | Facility: CLINIC | Age: 84
End: 2019-06-13

## 2019-06-13 VITALS
DIASTOLIC BLOOD PRESSURE: 72 MMHG | WEIGHT: 230 LBS | SYSTOLIC BLOOD PRESSURE: 160 MMHG | RESPIRATION RATE: 18 BRPM | TEMPERATURE: 98 F | OXYGEN SATURATION: 100 % | HEART RATE: 62 BPM | HEIGHT: 65 IN | BODY MASS INDEX: 38.32 KG/M2

## 2019-06-13 DIAGNOSIS — I50.22 SYSTOLIC CHF, CHRONIC: ICD-10-CM

## 2019-06-13 DIAGNOSIS — L97.923: Primary | ICD-10-CM

## 2019-06-13 LAB — BNP SERPL-MCNC: 694 PG/ML (ref 0–99)

## 2019-06-13 RX ORDER — POTASSIUM CHLORIDE 750 MG/1
20 TABLET, EXTENDED RELEASE ORAL 2 TIMES DAILY
Qty: 40 TABLET | Refills: 0 | Status: SHIPPED | OUTPATIENT
Start: 2019-06-13 | End: 2019-06-18

## 2019-06-13 RX ORDER — POTASSIUM CHLORIDE 750 MG/1
20 TABLET, EXTENDED RELEASE ORAL 2 TIMES DAILY
Qty: 20 TABLET | Refills: 0 | Status: SHIPPED | OUTPATIENT
Start: 2019-06-13 | End: 2019-06-13 | Stop reason: SDUPTHER

## 2019-06-13 RX ORDER — FUROSEMIDE 40 MG/1
80 TABLET ORAL 2 TIMES DAILY
Qty: 28 TABLET | Refills: 0 | Status: SHIPPED | OUTPATIENT
Start: 2019-06-13 | End: 2019-07-10

## 2019-06-13 NOTE — ED TRIAGE NOTES
Pt with severe swelling to bilat lower ext for the past few weeks that have been getting worse. Pt states she was a clinic today and PCP advised pt to come to ED for IV antibiotics. Pt c/o tenderness to lower ext and states that wounds are weeping.

## 2019-06-13 NOTE — ED NOTES
Adult Physical Assessment  LOC: Celina Parra, 86 y.o. female verified via two identifiers.  The patient is awake, alert, oriented and speaking appropriately at this time.  APPEARANCE: Patient resting comfortably and appears to be in no acute distress at this time. Patient is clean and well groomed, patient's clothing is properly fastened.  SKIN:The skin is warm and dry, color consistent with ethnicity, patient has normal skin turgor and moist mucus membranes. Bilateral lower extremities with open wounds and edematous.  MUSCULOSKELETAL: Patient moving all extremities well, bilateral swelling noted in lower extremities.  RESPIRATORY: Airway is open and patent, respirations are spontaneous, patient has a normal effort and rate, no accessory muscle use noted.  CARDIAC: Patient has a normal rate and rhythm, no periphreal edema noted in any extremity, capillary refill < 3 seconds in all extremities  ABDOMEN: Soft and non tender to palpation, no abdominal distention noted. Bowel sounds present in all four quadrants.  NEUROLOGIC: Eyes open spontaneously, behavior appropriate to situation, follows commands, facial expression symmetrical, bilateral hand grasp equal and even, purposeful motor response noted, normal sensation in all extremities when touched with a finger.

## 2019-06-13 NOTE — TELEPHONE ENCOUNTER
Hi, please call her daughter --  Her recent blood work shows evidence of fluid retention related to her heart, a condition called congestive heart failure which she has had for a long time and is now worsening and causing her leg swelling.    I hope the decreased bread intake (since it has salt) and the increased lasix is helping.    Let me know if she would like to see a cardiologist for the CHF.    Let me know if patient has any questions.  Thank you, Chago Jones

## 2019-06-13 NOTE — ED PROVIDER NOTES
Encounter Date: 6/12/2019    SCRIBE #1 NOTE: I, Lois Deng , am scribing for, and in the presence of,  Dr. Araiza. I have scribed the following portions of the note - Other sections scribed: HPI, ROS, PE, Attending ED Notes.       History     Chief Complaint   Patient presents with    Wound Check     maggots to wound per home health nurse     Ms. Celina Parra is a 86 y.o. female with a past medical history of HTN, stroke, CHF, and A-fib presents with a complaint of bugs in her wound on her LLE. Pt daughter states that she got a call from the home health nurse around 1:00 PM reporting that the pt had bugs in her wound. Pt was taken to her PCP who ut a solution on the wound and cleaned it. Pt's daughter states that her legs have been swollen for a few weeks. Pt denies any fever, cough, SOB, n/v/d.     The history is provided by the patient and medical records.     Review of patient's allergies indicates:   Allergen Reactions    Iodine and iodide containing products Hives    Sulfa (sulfonamide antibiotics) Hives     Past Medical History:   Diagnosis Date    Anticoagulant long-term use     Arthritis     Atrial fibrillation     Cataract     Cataract associated with another disorder     CHF (congestive heart failure) 1/10/2013    Hypertension     Potassium (K) deficiency     Stroke      Past Surgical History:   Procedure Laterality Date    EXTRACTION, CATARACT, EXTRACAPSULAR Left 2/6/2014    [facility]    HYSTERECTOMY      INSERTION, IOL PROSTHESIS Left 2/6/2014    Per[facility] by [facility] at [facility] OR     Family History   Problem Relation Age of Onset    Hypertension Mother     Hypertension Sister     Hypertension Brother     Amblyopia Neg Hx     Blindness Neg Hx     Cancer Neg Hx     Cataracts Neg Hx     Diabetes Neg Hx     Glaucoma Neg Hx     Macular degeneration Neg Hx     Retinal detachment Neg Hx     Strabismus Neg Hx     Stroke Neg Hx     Thyroid  disease Neg Hx      Social History     Tobacco Use    Smoking status: Never Smoker   Substance Use Topics    Alcohol use: No    Drug use: No     Review of Systems   Constitutional: Negative for fever.   Respiratory: Negative for shortness of breath.    Cardiovascular: Negative for chest pain.   Gastrointestinal: Negative for nausea and vomiting.   Skin: Positive for wound.   All other systems reviewed and are negative.      Physical Exam     Initial Vitals   BP Pulse Resp Temp SpO2   06/12/19 2201 06/12/19 1740 06/12/19 1740 06/12/19 1740 06/12/19 1740   (!) 180/98 63 18 98 °F (36.7 °C) 98 %      MAP       --                Physical Exam    Nursing note and vitals reviewed.  Constitutional: She is not diaphoretic. No distress.   HENT:   Upper edentulous.    Eyes:   Arcykscils.    Neck: Normal range of motion. Neck supple.   Cardiovascular: Normal rate, regular rhythm and normal heart sounds.   Pulmonary/Chest: Breath sounds normal. No respiratory distress.   Abdominal: Soft. She exhibits no distension. There is no tenderness.   Musculoskeletal:   4+ edema.    Neurological: She is alert.   Skin:   Distal refill sluggish. 6 cm circular lesion to the LLE. Posterior superficial ulcer as well on LLE. No mydriasis.          ED Course   Procedures  Labs Reviewed   CBC W/ AUTO DIFFERENTIAL - Abnormal; Notable for the following components:       Result Value    RBC 3.79 (*)     Hemoglobin 10.2 (*)     Hematocrit 34.6 (*)     Mean Corpuscular Hemoglobin 26.9 (*)     Mean Corpuscular Hemoglobin Conc 29.5 (*)     RDW 17.3 (*)     All other components within normal limits   COMPREHENSIVE METABOLIC PANEL - Abnormal; Notable for the following components:    Total Bilirubin 1.2 (*)     ALT 5 (*)     eGFR if  52.5 (*)     eGFR if non  45.6 (*)     All other components within normal limits   B-TYPE NATRIURETIC PEPTIDE - Abnormal; Notable for the following components:     (*)     All other  components within normal limits   LACTIC ACID, PLASMA          Imaging Results    None          Medical Decision Making:   History:   Old Medical Records: I decided to obtain old medical records.  Independently Interpreted Test(s):   I have ordered and independently interpreted EKG Reading(s) - see prior notes  Clinical Tests:   Lab Tests: Ordered and Reviewed  Medical Tests: Ordered and Reviewed  ED Management:  Benign renal function. Will increase lasix. Has appt with wound care. No signs of necrotic tissue or insects in wound. No indix abx. Will d/c home. No signs of acute arterial occlusion.   Posterior leg wound with some fat exposed, but ant leg ulcer is superficial.           Scribe Attestation:   Scribe #1: I performed the above scribed service and the documentation accurately describes the services I performed. I attest to the accuracy of the note.               Clinical Impression:   Peripheral edema, Ulcer to left leg.                            Zhao Araiza MD  06/13/19 0036

## 2019-06-14 NOTE — TELEPHONE ENCOUNTER
Hi, please contact the patient to assist in scheduling    Orders Placed This Encounter    HME - OTHER    Basic metabolic panel    Magnesium    Ambulatory referral to Home Health    Ambulatory consult to Wound Clinic    Ambulatory Referral to Cardiology   please fax home health order and mattress to [facility].    Please call wound clinic and see if she can be seen next week (her current appt is for 2 weeks from now) please also help set up cardiology and    Blood work 1 week from now    Thank you, Chago Jones    I called daughter, discussed above, discussed abx not indicated at this time.

## 2019-06-17 NOTE — TELEPHONE ENCOUNTER
Skyler Ambrocio,  please contact the patient's daughter to assist in scheduling         Orders Placed This Encounter    HME - OTHER    Basic metabolic panel    Magnesium    Ambulatory referral to Home Health    Ambulatory consult to Wound Clinic    Ambulatory Referral to Cardiology   please fax home health order and mattress to [facility].     Please call wound clinic and see if she can be seen next week (her current appt is for 2 weeks from now) please also help set up cardiology and     Blood work for Friday 6/21.     Thank you, Chago Jones     I called daughter, discussed above, discussed abx not indicated at this time.

## 2019-06-19 ENCOUNTER — LAB VISIT (OUTPATIENT)
Dept: LAB | Facility: HOSPITAL | Age: 84
End: 2019-06-19
Attending: INTERNAL MEDICINE
Payer: MEDICARE

## 2019-06-19 DIAGNOSIS — L97.923: ICD-10-CM

## 2019-06-19 DIAGNOSIS — I50.22 SYSTOLIC CHF, CHRONIC: ICD-10-CM

## 2019-06-19 LAB
ANION GAP SERPL CALC-SCNC: 8 MMOL/L (ref 8–16)
BUN SERPL-MCNC: 27 MG/DL (ref 8–23)
CALCIUM SERPL-MCNC: 9.9 MG/DL (ref 8.7–10.5)
CHLORIDE SERPL-SCNC: 103 MMOL/L (ref 95–110)
CO2 SERPL-SCNC: 30 MMOL/L (ref 23–29)
CREAT SERPL-MCNC: 1.1 MG/DL (ref 0.5–1.4)
EST. GFR  (AFRICAN AMERICAN): 53 ML/MIN/1.73 M^2
EST. GFR  (NON AFRICAN AMERICAN): 46 ML/MIN/1.73 M^2
GLUCOSE SERPL-MCNC: 75 MG/DL (ref 70–110)
POTASSIUM SERPL-SCNC: 4.2 MMOL/L (ref 3.5–5.1)
SODIUM SERPL-SCNC: 141 MMOL/L (ref 136–145)

## 2019-06-19 PROCEDURE — 80048 BASIC METABOLIC PNL TOTAL CA: CPT

## 2019-06-19 PROCEDURE — 83735 ASSAY OF MAGNESIUM: CPT

## 2019-06-19 PROCEDURE — 36415 COLL VENOUS BLD VENIPUNCTURE: CPT

## 2019-06-20 ENCOUNTER — TELEPHONE (OUTPATIENT)
Dept: INTERNAL MEDICINE | Facility: CLINIC | Age: 84
End: 2019-06-20

## 2019-06-20 DIAGNOSIS — I50.22 SYSTOLIC CHF, CHRONIC: Primary | ICD-10-CM

## 2019-06-20 LAB — MAGNESIUM SERPL-MCNC: 2 MG/DL (ref 1.6–2.6)

## 2019-06-20 NOTE — TELEPHONE ENCOUNTER
Hi, please call her daughter --  Her blood work was OK, the electrolytes are ok on the current dose of medicines.    Please ask if she has received the mattress and if home health has been seeing her.    Lastly -- I do not think they have scheduled with cardiology yet. Please offer assistance with scheduling.    Let me know if patient has any questions.  Thank you, Chago Jones

## 2019-06-26 ENCOUNTER — OFFICE VISIT (OUTPATIENT)
Dept: WOUND CARE | Facility: CLINIC | Age: 84
End: 2019-06-26
Payer: MEDICARE

## 2019-06-26 ENCOUNTER — TELEPHONE (OUTPATIENT)
Dept: WOUND CARE | Facility: CLINIC | Age: 84
End: 2019-06-26

## 2019-06-26 ENCOUNTER — OFFICE VISIT (OUTPATIENT)
Dept: CARDIOLOGY | Facility: CLINIC | Age: 84
End: 2019-06-26
Payer: MEDICARE

## 2019-06-26 VITALS
HEART RATE: 57 BPM | TEMPERATURE: 99 F | WEIGHT: 230 LBS | BODY MASS INDEX: 38.32 KG/M2 | HEIGHT: 65 IN | SYSTOLIC BLOOD PRESSURE: 153 MMHG | DIASTOLIC BLOOD PRESSURE: 87 MMHG

## 2019-06-26 VITALS
HEIGHT: 65 IN | WEIGHT: 240 LBS | BODY MASS INDEX: 39.99 KG/M2 | HEART RATE: 63 BPM | DIASTOLIC BLOOD PRESSURE: 85 MMHG | SYSTOLIC BLOOD PRESSURE: 156 MMHG

## 2019-06-26 DIAGNOSIS — I48.20 CHRONIC ATRIAL FIBRILLATION: Chronic | ICD-10-CM

## 2019-06-26 DIAGNOSIS — I10 ESSENTIAL HYPERTENSION: Chronic | ICD-10-CM

## 2019-06-26 DIAGNOSIS — L97.921 ULCER OF LEFT LOWER EXTREMITY, LIMITED TO BREAKDOWN OF SKIN: ICD-10-CM

## 2019-06-26 DIAGNOSIS — R60.0 BILATERAL EDEMA OF LOWER EXTREMITY: Primary | Chronic | ICD-10-CM

## 2019-06-26 DIAGNOSIS — I50.22 SYSTOLIC CHF, CHRONIC: Chronic | ICD-10-CM

## 2019-06-26 DIAGNOSIS — L97.911 ULCER OF RIGHT LOWER EXTREMITY, LIMITED TO BREAKDOWN OF SKIN: ICD-10-CM

## 2019-06-26 PROCEDURE — 29580 STRAPPING UNNA BOOT: CPT | Mod: 50,S$GLB,, | Performed by: NURSE PRACTITIONER

## 2019-06-26 PROCEDURE — 1101F PR PT FALLS ASSESS DOC 0-1 FALLS W/OUT INJ PAST YR: ICD-10-PCS | Mod: CPTII,S$GLB,, | Performed by: NURSE PRACTITIONER

## 2019-06-26 PROCEDURE — 99999 PR PBB SHADOW E&M-EST. PATIENT-LVL IV: CPT | Mod: PBBFAC,,, | Performed by: NURSE PRACTITIONER

## 2019-06-26 PROCEDURE — 99999 PR PBB SHADOW E&M-EST. PATIENT-LVL IV: ICD-10-PCS | Mod: PBBFAC,,, | Performed by: NURSE PRACTITIONER

## 2019-06-26 PROCEDURE — 99499 RISK ADDL DX/OHS AUDIT: ICD-10-PCS | Mod: S$GLB,,, | Performed by: NURSE PRACTITIONER

## 2019-06-26 PROCEDURE — 99499 UNLISTED E&M SERVICE: CPT | Mod: S$GLB,,, | Performed by: INTERNAL MEDICINE

## 2019-06-26 PROCEDURE — 99203 OFFICE O/P NEW LOW 30 MIN: CPT | Mod: 25,S$GLB,, | Performed by: NURSE PRACTITIONER

## 2019-06-26 PROCEDURE — 1101F PT FALLS ASSESS-DOCD LE1/YR: CPT | Mod: CPTII,S$GLB,, | Performed by: NURSE PRACTITIONER

## 2019-06-26 PROCEDURE — 1101F PR PT FALLS ASSESS DOC 0-1 FALLS W/OUT INJ PAST YR: ICD-10-PCS | Mod: CPTII,S$GLB,, | Performed by: INTERNAL MEDICINE

## 2019-06-26 PROCEDURE — 99203 PR OFFICE/OUTPT VISIT, NEW, LEVL III, 30-44 MIN: ICD-10-PCS | Mod: 25,S$GLB,, | Performed by: NURSE PRACTITIONER

## 2019-06-26 PROCEDURE — 99499 RISK ADDL DX/OHS AUDIT: ICD-10-PCS | Mod: S$GLB,,, | Performed by: INTERNAL MEDICINE

## 2019-06-26 PROCEDURE — 1101F PT FALLS ASSESS-DOCD LE1/YR: CPT | Mod: CPTII,S$GLB,, | Performed by: INTERNAL MEDICINE

## 2019-06-26 PROCEDURE — 99999 PR PBB SHADOW E&M-EST. PATIENT-LVL III: ICD-10-PCS | Mod: PBBFAC,,, | Performed by: INTERNAL MEDICINE

## 2019-06-26 PROCEDURE — 99204 OFFICE O/P NEW MOD 45 MIN: CPT | Mod: S$GLB,,, | Performed by: INTERNAL MEDICINE

## 2019-06-26 PROCEDURE — 29580 PR STRAPPING UNNA BOOT: ICD-10-PCS | Mod: 50,S$GLB,, | Performed by: NURSE PRACTITIONER

## 2019-06-26 PROCEDURE — 99999 PR PBB SHADOW E&M-EST. PATIENT-LVL III: CPT | Mod: PBBFAC,,, | Performed by: INTERNAL MEDICINE

## 2019-06-26 PROCEDURE — 99499 UNLISTED E&M SERVICE: CPT | Mod: S$GLB,,, | Performed by: NURSE PRACTITIONER

## 2019-06-26 PROCEDURE — 99204 PR OFFICE/OUTPT VISIT, NEW, LEVL IV, 45-59 MIN: ICD-10-PCS | Mod: S$GLB,,, | Performed by: INTERNAL MEDICINE

## 2019-06-26 RX ORDER — POTASSIUM CHLORIDE 1.5 G/1.58G
20 POWDER, FOR SOLUTION ORAL ONCE
COMMUNITY
End: 2019-08-20

## 2019-06-26 RX ORDER — HYDROCHLOROTHIAZIDE 25 MG/1
25 TABLET ORAL DAILY
Qty: 30 TABLET | Refills: 11 | Status: SHIPPED | OUTPATIENT
Start: 2019-06-26 | End: 2020-04-01

## 2019-06-26 NOTE — LETTER
June 26, 2019      Chago Jones MD  [address]  [place] [address]           [facility]  [address]  [place] [address]  Phone: [phone]          Patient: Celina Parra   MR Number: [id number]   YOB: 1933   Date of Visit: 6/26/2019       Dear Dr. Chago Jones:    Thank you for referring Celina Parra to me for evaluation. Attached you will find relevant portions of my assessment and plan of care.    If you have questions, please do not hesitate to call me. I look forward to following Celina Parra along with you.    Sincerely,    Maxine Downey, LUIS EDUARDO    Enclosure  CC:  No Recipients    If you would like to receive this communication electronically, please contact [email or web address] or [phone] to request more information on [employer] Link access.    For providers and/or their staff who would like to refer a patient to [facility], please contact us through our one-stop-shop provider referral line, [facility], at [phone].    If you feel you have received this communication in error or would no longer like to receive these types of communications, please e-mail [email or web address]

## 2019-06-26 NOTE — LETTER
June 26, 2019      Chago Jones MD  [address]  [place] [address]           [facility] - Cardiology  [address]  [place] [address]  Phone: [phone]          Patient: Celina Parra   MR Number: [id number]   YOB: 1933   Date of Visit: 6/26/2019       Dear Dr. Chago Jones:    Thank you for referring Celina Parra to me for evaluation. Attached you will find relevant portions of my assessment and plan of care.    If you have questions, please do not hesitate to call me. I look forward to following Celina Parra along with you.    Sincerely,    Syed Eason MD    Enclosure  CC:  No Recipients    If you would like to receive this communication electronically, please contact [email or web address] or [phone] to request more information on "[employer] Link access.    For providers and/or their staff who would like to refer a patient to [facility], please contact us through our one-stop-shop provider referral line, [facility] , at [phone].    If you feel you have received this communication in error or would no longer like to receive these types of communications, please e-mail [email or web address]

## 2019-06-26 NOTE — PROGRESS NOTES
Subjective:       Patient ID: Celina Parra is a 86 y.o. female.    Chief Complaint: Wound Check    HPI   This is an 86 year old female referred for evaluation and management of bilateral lower leg edema with ulceration.  This began sometime in April 2019.  She has been cleaning the legs and applying dry dressings.  She is using no form of compression.  The wounds are not healing.  Her medical history is significant for HTN, venous insufficiency, bilateral leg edema, and chronic atrial fibrillation and CHF.  She is afebrile.  She denies increased redness, swelling or purulent drainage.  She does not complain of pain. She has home health services through [facility].    Review of Systems   Constitutional: Negative for chills, diaphoresis and fever.   HENT: Positive for hearing loss, sinus pressure and trouble swallowing. Negative for postnasal drip, rhinorrhea, sneezing, sore throat and tinnitus.    Eyes: Positive for visual disturbance.   Respiratory: Positive for cough. Negative for apnea, shortness of breath and wheezing.    Cardiovascular: Positive for leg swelling. Negative for chest pain and palpitations.   Gastrointestinal: Positive for constipation, diarrhea and nausea. Negative for vomiting.   Genitourinary: Negative for difficulty urinating, dysuria, frequency and hematuria.   Musculoskeletal: Positive for arthralgias and back pain. Negative for joint swelling.   Skin: Positive for wound.   Neurological: Negative for dizziness, weakness, light-headedness and headaches.   Hematological: Does not bruise/bleed easily.   Psychiatric/Behavioral: Negative for confusion, decreased concentration, dysphoric mood and sleep disturbance. The patient is not nervous/anxious.        Objective:      Physical Exam   Constitutional: She is oriented to person, place, and time. She appears well-developed and well-nourished. No distress.   Poor dentition   HENT:   Head: Normocephalic and atraumatic.   Mouth/Throat:  Oropharynx is clear and moist.   Eyes: Pupils are equal, round, and reactive to light. Conjunctivae and EOM are normal. Right eye exhibits no discharge. Left eye exhibits no discharge. No scleral icterus.   Neck: Neck supple. No JVD present. No tracheal deviation present. No thyromegaly present.   Cardiovascular: Normal rate, normal heart sounds and intact distal pulses. An irregularly irregular rhythm present. Exam reveals no gallop and no friction rub.   No murmur heard.  Pulmonary/Chest: Effort normal and breath sounds normal. No respiratory distress. She has no wheezes. She has no rales.   Abdominal: Soft. Bowel sounds are normal. She exhibits no distension. There is no tenderness.   Musculoskeletal: She exhibits edema (3-4+ lower leg). She exhibits no tenderness.        Legs:  Neurological: She is alert and oriented to person, place, and time.   Skin: Skin is warm and dry. No rash noted. She is not diaphoretic. No erythema.   Psychiatric: She has a normal mood and affect. Her behavior is normal. Judgment and thought content normal.   Nursing note and vitals reviewed.      Celina was seen in the clinic room and placed in the supine position on the treatment table.  The legs were cleansed with Easi-clense sponges and dried thoroughly.  Medihoney gel and a hydrofiber dressing was applied to the wound.  Eucerin cream was applied to the lower legs.  The patient's feet were positioned at a 90 degree angle.  A zinc oxide wrap, followed by kerlix roll gauze and coban were applied using a spiral technique avoiding creases or folds.  The wraps were started behind the first metatarsal and ended below the tibial tubercle of the knee.  There was overlap of each turn half the width of the previous turn.  The compression wraps will be changed every 2-3 days.    Assessment:       1. Bilateral edema of lower extremity    2. Ulcer of left lower extremity, limited to breakdown of skin               Plan:            Unna boot  bilateral lower legs as detailed above.  Patient was warned not to get the dressings wet and to use cast covers for showering.  Should the dressing become wet, he is to remove it, place a wet-to-dry dressing over the wound, cover with gauze and roll gauze and use ace wraps for compression and to secure bandages.  He should then notify this office as soon as possible to have a new dressing applied.  [facility] notified of orders via Epic fax.  We will ask them to see the patient three times weekly.  Return to clinic in one month.    Home Health Orders:  Cleanse wounds with wound cleanser.  Apply medihoney gel to wounds and cover with hydrofiber and ABD pads.  Pad ankles with ABD pads.  Unna boot (zinc oxide, kerlix and coban) bilateral lower legs.  Dressing change 3 times weekly.  Skilled nurse visit-3 x weekly      Right calf    Left calf    Left shin

## 2019-06-27 NOTE — PROGRESS NOTES
Subjective:   Chief Complaint: Fluid Build-up in Legs  Last Clinic Visit: New Patient    History of Present Illness: Celina Parra is a 86 y.o. lady with remote venous stasis ulcer (seen by cardiology in 2012), HTN, HLD, permanent AF dx in 2012, and possible heart failure who presents with several months of lower extremity edema.  She recently had a wound that was found to be infected by wound care and admitted briefly to the hospital.  She has a TTE from 2015 which showed normal LV function, severe LA enlargement with PA pressures in the 40s.  Her daughter and her report that several months prior she had normal legs.  No change in dyspnea on exertion, no chest pain, no palpitations, no syncope. They report being on lasix for several years but was recently increased. Unclear how long she has been on amlodipine - but appears to pre-date current bout of swelling.    PMHx:  Heart Failure?  Venous stasis ulcer ~2012  HTN  HLD  Permanent AF    Review of Systems   Constitution: Negative.   HENT: Negative.    Eyes: Negative.    Cardiovascular: Positive for leg swelling.   Respiratory: Negative.    Hematologic/Lymphatic: Negative.    Skin: Negative.    Musculoskeletal: Negative.    Gastrointestinal: Negative.    Genitourinary: Negative.      Medications:  Current Outpatient Medications on File Prior to Visit   Medication Sig    amLODIPine (NORVASC) 10 MG tablet TAKE 1 TABLET (10 MG TOTAL) BY MOUTH ONCE DAILY.    fluticasone propionate (FLONASE) 50 mcg/actuation nasal spray     furosemide (LASIX) 40 MG tablet Take 2 tablets (80 mg total) by mouth 2 (two) times daily. for 7 days    metoprolol succinate (TOPROL-XL) 50 MG 24 hr tablet TAKE 3 TABLETS (150 MG TOTAL) BY MOUTH ONCE DAILY.    omeprazole (PRILOSEC) 40 MG capsule Take 1 capsule (40 mg total) by mouth every morning. Take 30 minutes before eating on an empty stomach    potassium chloride (KLOR-CON) 20 mEq Pack Take 20 mEq by mouth once.    valsartan (DIOVAN) 320  "MG tablet TAKE 1 TABLET (320 MG TOTAL) BY MOUTH ONCE DAILY.    XARELTO 20 mg Tab TAKE 1 TABLET BY MOUTH EVERY DAY    ciclopirox (PENLAC) 8 % Soln Apply topically nightly.     No current facility-administered medications on file prior to visit.      Family History:  Celina's family history includes Hypertension in her brother, mother, and sister.    Social History:  Celina reports that she has never smoked. She does not have any smokeless tobacco history on file. She reports that she does not drink alcohol or use drugs.    Objective:   BP (!) 156/85   Pulse 63   Ht 5' 5" (1.651 m)   Wt 108.9 kg (240 lb) Comment: Pt states she weighs 240lbs  BMI 39.94 kg/m²     Physical Exam   Constitutional: She is oriented to person, place, and time and well-developed, well-nourished, and in no distress. No distress.   HENT:   Head: Normocephalic and atraumatic.   Mouth/Throat: No oropharyngeal exudate.   Eyes: EOM are normal. No scleral icterus.   Neck: No JVD present. No tracheal deviation present. No thyromegaly present.   Cardiovascular: Normal rate and regular rhythm. Exam reveals no gallop and no friction rub.   No murmur heard.  Irregularly irregular   Pulmonary/Chest: Effort normal and breath sounds normal. No respiratory distress. She has no wheezes. She has no rales. She exhibits no tenderness.   Abdominal: Soft. She exhibits no distension. There is no tenderness. There is no rebound and no guarding.   Musculoskeletal: Normal range of motion. She exhibits edema (pitting and non-pitting to thighs bilaterally).   Neurological: She is alert and oriented to person, place, and time.   Skin: Skin is warm and dry. She is not diaphoretic. No erythema.   Legs wrapped in bandages.   Psychiatric: Affect normal.     EKG:  My independent visualization of most recent EKG is Atrial Fibrillation, poor R-wave progression, cannot rule out anterior and inferior infarct, PVC    TTE:  9/25/2015  CONCLUSIONS     1 - Concentric remodeling. "     2 - Normal left ventricular systolic function (EF 55-60%).     3 - Left ventricular diastolic dysfunction.     4 - Biatrial enlargement.     5 - Normal right ventricular systolic function .     6 - Pulmonary hypertension. The estimated PA systolic pressure is 43 mmHg.     7 - Mild aortic regurgitation.     8 - Mild mitral regurgitation.     9 - Mild tricuspid regurgitation.    Lipids:  Recent Labs   Lab 02/27/19  1640   LDL Cholesterol 91.6   HDL 41   Cholesterol 150      Renal:  Recent Labs   Lab 06/19/19  1550   Potassium 4.2   CO2 30 H   BUN, Bld 27 H   Creatinine 1.1     Liver:  Recent Labs   Lab 06/12/19  2142   AST 20   ALT 5 L       Assessment:     1. Bilateral edema of lower extremity    2. Systolic CHF, chronic    3. Essential hypertension    4. Chronic atrial fibrillation        Plan:   1. Bilateral edema of lower extremity  Unclear if this is lymphedema or CHF, will check labs for diuresis and eval IVC.  If IVC small in the setting of diuresis would argue in favor of lymphedema    2. Systolic CHF, chronic  CHecking echo, BNP, BMP.  - Transthoracic echo (TTE) 2D with Color Flow; Future  - hydroCHLOROthiazide (HYDRODIURIL) 25 MG tablet; Take 1 tablet (25 mg total) by mouth once daily.  Dispense: 30 tablet; Refill: 11  - Basic metabolic panel; Future  - Brain natriuretic peptide; Future  - Magnesium; Future    3. Essential hypertension  BP above goal, starting HCTZ    4. Chronic atrial fibrillation  Continue xarelto    Follow up in about 3 months (around 9/26/2019).

## 2019-07-02 ENCOUNTER — TELEPHONE (OUTPATIENT)
Dept: INTERNAL MEDICINE | Facility: CLINIC | Age: 84
End: 2019-07-02

## 2019-07-02 NOTE — TELEPHONE ENCOUNTER
----- Message from Tessie Herrera sent at 7/2/2019 10:23 AM CDT -----  Contact: [employer]/[email or web address]/[phone]   second message.   Lorena called in regards needing to f/u with Dr Jones nurse about home health [facility]. Please call and advise. Thank you.

## 2019-07-03 ENCOUNTER — TELEPHONE (OUTPATIENT)
Dept: INTERNAL MEDICINE | Facility: CLINIC | Age: 84
End: 2019-07-03

## 2019-07-03 NOTE — TELEPHONE ENCOUNTER
Spoke with Lorena who called back to let us know that patient already had HH, so she canceled recent order.

## 2019-07-05 ENCOUNTER — HOSPITAL ENCOUNTER (OUTPATIENT)
Dept: CARDIOLOGY | Facility: CLINIC | Age: 84
Discharge: HOME | End: 2019-07-05
Attending: INTERNAL MEDICINE
Payer: MEDICARE

## 2019-07-05 VITALS
HEIGHT: 65 IN | DIASTOLIC BLOOD PRESSURE: 98 MMHG | HEART RATE: 66 BPM | WEIGHT: 240 LBS | SYSTOLIC BLOOD PRESSURE: 160 MMHG | BODY MASS INDEX: 39.99 KG/M2

## 2019-07-05 DIAGNOSIS — I50.22 SYSTOLIC CHF, CHRONIC: ICD-10-CM

## 2019-07-05 LAB
ASCENDING AORTA: 5.1 CM
AV INDEX (PROSTH): 0.54
AV MEAN GRADIENT: 7 MMHG
AV PEAK GRADIENT: 13 MMHG
AV VALVE AREA: 1.87 CM2
AV VELOCITY RATIO: 0.6
BSA FOR ECHO PROCEDURE: 2.23 M2
CV ECHO LV RWT: 0.59 CM
DOP CALC AO PEAK VEL: 1.78 M/S
DOP CALC AO VTI: 33.85 CM
DOP CALC LVOT AREA: 3.5 CM2
DOP CALC LVOT DIAMETER: 2.11 CM
DOP CALC LVOT PEAK VEL: 1.06 M/S
DOP CALC LVOT STROKE VOLUME: 63.29 CM3
DOP CALCLVOT PEAK VEL VTI: 18.11 CM
E WAVE DECELERATION TIME: 148 MSEC
E/A RATIO: 2.56
E/E' RATIO: 19.09 M/S
ECHO LV POSTERIOR WALL: 1.38 CM (ref 0.6–1.1)
FRACTIONAL SHORTENING: 19 % (ref 28–44)
INTERVENTRICULAR SEPTUM: 1.5 CM (ref 0.6–1.1)
IVRT: 0.1 MSEC
LA MAJOR: 8.03 CM
LA MINOR: 8.05 CM
LA WIDTH: 5.5 CM
LEFT ATRIUM SIZE: 4.29 CM
LEFT ATRIUM VOLUME INDEX: 75.4 ML/M2
LEFT ATRIUM VOLUME: 161.25 CM3
LEFT INTERNAL DIMENSION IN SYSTOLE: 3.77 CM (ref 2.1–4)
LEFT VENTRICLE DIASTOLIC VOLUME INDEX: 46.77 ML/M2
LEFT VENTRICLE DIASTOLIC VOLUME: 99.97 ML
LEFT VENTRICLE MASS INDEX: 127 G/M2
LEFT VENTRICLE SYSTOLIC VOLUME INDEX: 28.4 ML/M2
LEFT VENTRICLE SYSTOLIC VOLUME: 60.75 ML
LEFT VENTRICULAR INTERNAL DIMENSION IN DIASTOLE: 4.65 CM (ref 3.5–6)
LEFT VENTRICULAR MASS: 272.18 G
LV LATERAL E/E' RATIO: 15 M/S
LV SEPTAL E/E' RATIO: 26.25 M/S
MV PEAK A VEL: 0.41 M/S
MV PEAK E VEL: 1.05 M/S
PISA TR MAX VEL: 3.49 M/S
PULM VEIN S/D RATIO: 0.75
PV PEAK D VEL: 0.67 M/S
PV PEAK S VEL: 0.5 M/S
PV PEAK VELOCITY: 0.81 CM/S
RA MAJOR: 8.1 CM
RA PRESSURE: 15 MMHG
RA WIDTH: 4.58 CM
RIGHT VENTRICULAR END-DIASTOLIC DIMENSION: 3.69 CM
RV TISSUE DOPPLER FREE WALL SYSTOLIC VELOCITY 1 (APICAL 4 CHAMBER VIEW): 10.37 CM/S
SINUS: 3.14 CM
STJ: 2.84 CM
TDI LATERAL: 0.07 M/S
TDI SEPTAL: 0.04 M/S
TDI: 0.06 M/S
TR MAX PG: 49 MMHG
TRICUSPID ANNULAR PLANE SYSTOLIC EXCURSION: 1.28 CM
TV REST PULMONARY ARTERY PRESSURE: 64 MMHG

## 2019-07-05 PROCEDURE — 93306 TTE W/DOPPLER COMPLETE: CPT | Mod: S$GLB,,, | Performed by: INTERNAL MEDICINE

## 2019-07-05 PROCEDURE — 93306 TRANSTHORACIC ECHO (TTE) COMPLETE (CUPID ONLY): ICD-10-PCS | Mod: S$GLB,,, | Performed by: INTERNAL MEDICINE

## 2019-07-10 ENCOUNTER — TELEPHONE (OUTPATIENT)
Dept: CARDIOLOGY | Facility: CLINIC | Age: 84
End: 2019-07-10

## 2019-07-10 DIAGNOSIS — I50.22 SYSTOLIC CHF, CHRONIC: Primary | Chronic | ICD-10-CM

## 2019-07-10 RX ORDER — BUMETANIDE 1 MG/1
1 TABLET ORAL 2 TIMES DAILY
Qty: 60 TABLET | Refills: 11 | Status: SHIPPED | OUTPATIENT
Start: 2019-07-10 | End: 2020-04-01

## 2019-07-10 NOTE — TELEPHONE ENCOUNTER
Called patient's daughter, explained that ejection fraction newly depressed, etiologies include coronary disease versus hypertension.  They are not interested in invasive angiography or workup at this time, will continue to treat medically.    Also she continues to have lower extremity edema with dilated IVC on echo.  Not responding to Lasix, will try bumetanide    Checking BMP in one week    -Syed Eason

## 2019-07-11 ENCOUNTER — TELEPHONE (OUTPATIENT)
Dept: CARDIOLOGY | Facility: CLINIC | Age: 84
End: 2019-07-11

## 2019-07-11 NOTE — TELEPHONE ENCOUNTER
Spoke with patient and scheduled her lab for Fri 7/19.    Syed Eason MD        Can we schedule a BMP and BNP for the end of next week, 18th or 19th?     -Syed Eason     Routing comment       Syed Eason MD          Called patient's daughter, explained that ejection fraction newly depressed, etiologies include coronary disease versus hypertension.  They are not interested in invasive angiography or workup at this time, will continue to treat medically.     Also she continues to have lower extremity edema with dilated IVC on echo.  Not responding to Lasix, will try bumetanide     Checking BMP in one week     -Syed Eason          Documentation

## 2019-07-26 PROCEDURE — G0179 PR HOME HEALTH MD RECERTIFICATION: ICD-10-PCS | Mod: ,,, | Performed by: INTERNAL MEDICINE

## 2019-07-26 PROCEDURE — G0179 MD RECERTIFICATION HHA PT: HCPCS | Mod: ,,, | Performed by: INTERNAL MEDICINE

## 2019-07-31 ENCOUNTER — OFFICE VISIT (OUTPATIENT)
Dept: WOUND CARE | Facility: CLINIC | Age: 84
End: 2019-07-31
Payer: MEDICARE

## 2019-07-31 VITALS
HEART RATE: 53 BPM | BODY MASS INDEX: 39.99 KG/M2 | WEIGHT: 240 LBS | HEIGHT: 65 IN | TEMPERATURE: 97 F | SYSTOLIC BLOOD PRESSURE: 131 MMHG | DIASTOLIC BLOOD PRESSURE: 73 MMHG

## 2019-07-31 DIAGNOSIS — R60.0 BILATERAL EDEMA OF LOWER EXTREMITY: Primary | Chronic | ICD-10-CM

## 2019-07-31 PROBLEM — L97.921 ULCER OF LEFT LOWER EXTREMITY, LIMITED TO BREAKDOWN OF SKIN: Status: RESOLVED | Noted: 2019-06-26 | Resolved: 2019-07-31

## 2019-07-31 PROCEDURE — 99211 PR OFFICE/OUTPT VISIT, EST, LEVL I: ICD-10-PCS | Mod: S$GLB,,, | Performed by: NURSE PRACTITIONER

## 2019-07-31 PROCEDURE — 99999 PR PBB SHADOW E&M-EST. PATIENT-LVL IV: ICD-10-PCS | Mod: PBBFAC,,, | Performed by: NURSE PRACTITIONER

## 2019-07-31 PROCEDURE — 99211 OFF/OP EST MAY X REQ PHY/QHP: CPT | Mod: S$GLB,,, | Performed by: NURSE PRACTITIONER

## 2019-07-31 PROCEDURE — 99999 PR PBB SHADOW E&M-EST. PATIENT-LVL IV: CPT | Mod: PBBFAC,,, | Performed by: NURSE PRACTITIONER

## 2019-07-31 NOTE — PROGRESS NOTES
Subjective:       Patient ID: Celina Parra is a 86 y.o. female.    Chief Complaint: Wound Check    HPI     This patient is seen today for reevaluation of bilateral lower leg edema with ulceration.  This began sometime in April 2019.  [facility] is seeing the patient and applying compression wraps.  The ulcers on both legs are healed.  Her medical history is significant for HTN, venous insufficiency, bilateral leg edema, and chronic atrial fibrillation and CHF.  She is afebrile.  She denies increased redness, swelling or purulent drainage.  She does not complain of pain.     Review of Systems   Constitutional: Negative for chills, diaphoresis and fever.   HENT: Positive for hearing loss, sinus pressure and trouble swallowing. Negative for postnasal drip, rhinorrhea, sneezing, sore throat and tinnitus.    Eyes: Positive for visual disturbance.   Respiratory: Positive for cough. Negative for apnea, shortness of breath and wheezing.    Cardiovascular: Positive for leg swelling. Negative for chest pain and palpitations.   Gastrointestinal: Positive for constipation, diarrhea and nausea. Negative for vomiting.   Genitourinary: Negative for difficulty urinating, dysuria, frequency and hematuria.   Musculoskeletal: Positive for arthralgias and back pain. Negative for joint swelling.   Skin: Negative for wound.   Neurological: Negative for dizziness, weakness, light-headedness and headaches.   Hematological: Does not bruise/bleed easily.   Psychiatric/Behavioral: Negative for confusion, decreased concentration, dysphoric mood and sleep disturbance. The patient is not nervous/anxious.        Objective:      Physical Exam   Constitutional: She is oriented to person, place, and time. She appears well-developed and well-nourished. No distress.   Poor dentition   HENT:   Head: Normocephalic and atraumatic.   Cardiovascular: Intact distal pulses.   Musculoskeletal: She exhibits edema (3-4+ lower leg). She exhibits no  tenderness.        Legs:  Neurological: She is alert and oriented to person, place, and time.   Skin: Skin is warm and dry. No rash noted. She is not diaphoretic. No erythema.   Psychiatric: She has a normal mood and affect. Her behavior is normal. Judgment and thought content normal.   Nursing note and vitals reviewed.        Assessment:       1. Ulcer of left lower extremity, limited to breakdown of skin    2. Ulcer of right lower extremity, limited to breakdown of skin    3. Bilateral edema of lower extremity               Plan:            Over the counter knee high compression stockings bilateral lower legs.  Elevate legs.  Take diuretics as ordered.  Do not add salt to food and limit sodium intake.  [facility] notified that patient is healed.  May discharge from wound care.  Return to this clinic as needed.      Right calf      Left calf      Left shin

## 2019-07-31 NOTE — PATIENT INSTRUCTIONS
Over the counter knee high compression stockings bilateral lower legs.  You may purchase these at any pharmacy or at [facility] in the pharmacy department.  Apply stockings first thing in the morning and remove at bedtime.  Elevate legs.  Take diuretics as ordered.  Do not add salt to food and limit sodium intake.

## 2019-08-13 ENCOUNTER — EXTERNAL HOME HEALTH (OUTPATIENT)
Dept: HOME HEALTH SERVICES | Facility: HOSPITAL | Age: 84
End: 2019-08-13
Payer: MEDICARE

## 2019-08-13 RX ORDER — AMLODIPINE BESYLATE 10 MG/1
TABLET ORAL
Qty: 90 TABLET | Refills: 4 | Status: SHIPPED | OUTPATIENT
Start: 2019-08-13 | End: 2020-05-04

## 2019-08-16 ENCOUNTER — TELEPHONE (OUTPATIENT)
Dept: INTERNAL MEDICINE | Facility: CLINIC | Age: 84
End: 2019-08-16

## 2019-08-16 DIAGNOSIS — L97.923: Primary | ICD-10-CM

## 2019-08-16 NOTE — TELEPHONE ENCOUNTER
----- Message from Tessie Herrera sent at 8/16/2019  3:08 PM CDT -----  Contact: Dehemanth/Daughter/[phone]  .1 Patient would like to get medical advice.  Symptoms (please be specific): 2 sores in her legs  How long has patient had these symptoms: Wednesday 05/14/19  Pharm[facility] and phone[id number] [place] - [address] [phone] (Phone) [phone] (Fax)  Any drug allergies: onfile  Comments: Patient would like to get medical advice.

## 2019-08-20 RX ORDER — POTASSIUM CHLORIDE 20 MEQ/1
TABLET, EXTENDED RELEASE ORAL
Qty: 90 TABLET | Refills: 0 | Status: SHIPPED | OUTPATIENT
Start: 2019-08-20 | End: 2019-11-23 | Stop reason: SDUPTHER

## 2019-08-20 NOTE — TELEPHONE ENCOUNTER
Hi, I recommend that she see wound care and also keep appt with me for next week    Future Appointments   Date Time Provider Department Center   8/28/2019  3:20 PM Chago Jones MD [facility] Christian Saxena PCW     Let me know if patient has any questions.  Thank you, Chago Jones

## 2019-08-20 NOTE — TELEPHONE ENCOUNTER
Spoke with pt's daughter, she states that pt is no longer having Home Health and pt is having two new blisters in her hills that burst and they need to be taken care of. Should pt be seen by wound care again or have HH come back?

## 2019-08-22 DIAGNOSIS — I10 ESSENTIAL HYPERTENSION: Chronic | ICD-10-CM

## 2019-08-22 DIAGNOSIS — I50.22 SYSTOLIC CHF, CHRONIC: Chronic | ICD-10-CM

## 2019-08-22 NOTE — TELEPHONE ENCOUNTER
Spoke with [facility] nurse, additional notes faxed, insurance will approve HH and will contact [facility].

## 2019-08-23 PROCEDURE — G0180 MD CERTIFICATION HHA PATIENT: HCPCS | Mod: ,,, | Performed by: INTERNAL MEDICINE

## 2019-08-23 PROCEDURE — G0180 PR HOME HEALTH MD CERTIFICATION: ICD-10-PCS | Mod: ,,, | Performed by: INTERNAL MEDICINE

## 2019-08-25 RX ORDER — FUROSEMIDE 40 MG/1
40 TABLET ORAL DAILY
Qty: 90 TABLET | Refills: 3 | OUTPATIENT
Start: 2019-08-25

## 2019-08-28 ENCOUNTER — TELEPHONE (OUTPATIENT)
Dept: INTERNAL MEDICINE | Facility: CLINIC | Age: 84
End: 2019-08-28

## 2019-08-28 RX ORDER — FUROSEMIDE 40 MG/1
TABLET ORAL
Qty: 90 TABLET | Refills: 3 | OUTPATIENT
Start: 2019-08-28

## 2019-08-28 NOTE — TELEPHONE ENCOUNTER
Hi, I do not think she needs home PT.  Let me know if [facility] has any more questions.  Thank you, Chago Jones

## 2019-08-28 NOTE — TELEPHONE ENCOUNTER
----- Message from Mandie Calix sent at 8/28/2019  1:35 PM CDT -----  Contact: Chana/ [facility]/ [phone] fax# [phone]  Chana want to know if  P.T. is needed in the home and order need to be signed. Please call Chana.

## 2019-08-28 NOTE — TELEPHONE ENCOUNTER
Hi, [facility] -- [facility] are not refilling --  Furosemide/lasix since she is now on bumetanide/bumex.  Thank you, Chago Jones

## 2019-08-30 ENCOUNTER — TELEPHONE (OUTPATIENT)
Dept: INTERNAL MEDICINE | Facility: CLINIC | Age: 84
End: 2019-08-30

## 2019-08-30 NOTE — TELEPHONE ENCOUNTER
----- Message from Robyn Zendejas sent at 8/29/2019  1:44 PM CDT -----  Contact: Chana/ [facility]/ [phone] fax# [phone]  [employer] called in regards to wanting clarification on the order that the dr put on the pt paper work it says service strengthening exercises and evaluate. She would like a call back ASAP.       Please advise

## 2019-08-30 NOTE — TELEPHONE ENCOUNTER
"----- Message from Tonia Mcdonough sent at 8/30/2019 10:14 AM CDT -----  Contact: [facility]/ [facility] [phone]  Patient is returning a phone call.  Who left a message for the patient: Matilde  Does patient know what this is regarding:  Yes. I gave her the message from Dr. Jones, "Hi, I do not think she needs home PT" and she said she needs to hear it from you verbally on if you can fax it to her for her records    Thank you    "

## 2019-09-03 ENCOUNTER — TELEPHONE (OUTPATIENT)
Dept: INTERNAL MEDICINE | Facility: CLINIC | Age: 84
End: 2019-09-03

## 2019-09-03 NOTE — TELEPHONE ENCOUNTER
----- Message from Matilde Orozco MA sent at 8/30/2019  9:49 AM CDT -----  Contact: katelynn/gael/[phone]      ----- Message -----  From: Robyn Zendejas  Sent: 8/30/2019   9:20 AM  To: Robert Anders Staff    Pt called in regards getting a new handicap letter due to the one she has its not good any more. She would like a call once it is done so she can come and pick it up from the office.    Please advise

## 2019-09-17 ENCOUNTER — TELEPHONE (OUTPATIENT)
Dept: INTERNAL MEDICINE | Facility: CLINIC | Age: 84
End: 2019-09-17

## 2019-09-17 NOTE — TELEPHONE ENCOUNTER
----- Message from Orville Simmons sent at 9/16/2019  8:57 AM CDT -----  Contact: Windy Flores [facility] [phone]  Anita Flores will like to speak to the nurse in regards to disability verification, form was fax on 9/10/19, please advice.

## 2019-09-17 NOTE — TELEPHONE ENCOUNTER
Spoke with Anita and advised that we do have the paperwork and  does all his paperwork on Thursdays and she was ok with it being faxed on then.

## 2019-09-18 ENCOUNTER — TELEPHONE (OUTPATIENT)
Dept: WOUND CARE | Facility: CLINIC | Age: 84
End: 2019-09-18

## 2019-09-18 ENCOUNTER — TELEPHONE (OUTPATIENT)
Dept: INTERNAL MEDICINE | Facility: CLINIC | Age: 84
End: 2019-09-18

## 2019-09-18 NOTE — TELEPHONE ENCOUNTER
----- Message from Alisha Trinh sent at 9/18/2019 11:12 AM CDT -----  Contact: Vicky with [facility]#[phone]  She states that she just wanted to notify you that the pt wound is healed.

## 2019-09-18 NOTE — TELEPHONE ENCOUNTER
Hi, OK, I do not have anything to add. Good news that her wounds are healed.  Let me know if home health has any more questions.  Chago Jones

## 2019-09-18 NOTE — TELEPHONE ENCOUNTER
----- Message from Sabina Silva sent at 9/18/2019 11:04 AM CDT -----  Contact: Vicky gutierrez RN @ [facility] # [phone]  Ms. Vicky mckenzie RN at [facility] is calling in regards to her saying that the patient no longer have any wounds and she recommend to have the patient discharged from home rabia services. She would like a call back to speak with you about this please.

## 2019-09-20 ENCOUNTER — EXTERNAL HOME HEALTH (OUTPATIENT)
Dept: HOME HEALTH SERVICES | Facility: HOSPITAL | Age: 84
End: 2019-09-20
Payer: MEDICARE

## 2019-09-30 ENCOUNTER — TELEPHONE (OUTPATIENT)
Dept: HOME HEALTH SERVICES | Facility: HOSPITAL | Age: 84
End: 2019-09-30

## 2019-11-25 RX ORDER — POTASSIUM CHLORIDE 20 MEQ/1
TABLET, EXTENDED RELEASE ORAL
Qty: 90 TABLET | Refills: 0 | Status: SHIPPED | OUTPATIENT
Start: 2019-11-25 | End: 2020-01-27

## 2019-11-25 RX ORDER — VALSARTAN 320 MG/1
TABLET ORAL
Qty: 90 TABLET | Refills: 1 | Status: SHIPPED | OUTPATIENT
Start: 2019-11-25 | End: 2019-12-01 | Stop reason: SDUPTHER

## 2019-12-01 DIAGNOSIS — I10 ESSENTIAL HYPERTENSION: Primary | Chronic | ICD-10-CM

## 2019-12-01 RX ORDER — VALSARTAN 320 MG/1
TABLET ORAL
Qty: 90 TABLET | Refills: 1 | OUTPATIENT
Start: 2019-12-01 | End: 2019-12-02

## 2019-12-02 RX ORDER — VALSARTAN 320 MG/1
320 TABLET ORAL DAILY
Qty: 90 TABLET | Refills: 3 | Status: SHIPPED | OUTPATIENT
Start: 2019-12-02 | End: 2020-01-08

## 2020-01-08 DIAGNOSIS — I10 ESSENTIAL HYPERTENSION: Chronic | ICD-10-CM

## 2020-01-08 RX ORDER — VALSARTAN 320 MG/1
320 TABLET ORAL DAILY
Qty: 90 TABLET | Refills: 1 | Status: SHIPPED | OUTPATIENT
Start: 2020-01-08 | End: 2020-01-09

## 2020-01-08 NOTE — TELEPHONE ENCOUNTER
Hi, please call this in for some reason I cannot rx it electronically:  Orders Placed This Encounter    valsartan (DIOVAN) 320 MG tablet     Thank you, Chago Jones

## 2020-01-09 ENCOUNTER — TELEPHONE (OUTPATIENT)
Dept: INTERNAL MEDICINE | Facility: CLINIC | Age: 85
End: 2020-01-09

## 2020-01-09 DIAGNOSIS — I10 ESSENTIAL HYPERTENSION: Chronic | ICD-10-CM

## 2020-01-09 RX ORDER — VALSARTAN 160 MG/1
320 TABLET ORAL DAILY
Qty: 180 TABLET | Refills: 11 | Status: SHIPPED | OUTPATIENT
Start: 2020-01-09 | End: 2020-01-09 | Stop reason: SDUPTHER

## 2020-01-09 RX ORDER — VALSARTAN 160 MG/1
320 TABLET ORAL DAILY
Qty: 180 TABLET | Refills: 11 | Status: SHIPPED | OUTPATIENT
Start: 2020-01-09 | End: 2020-09-30

## 2020-01-09 NOTE — TELEPHONE ENCOUNTER
Hi,  Please call pharmacy and see if there is an alternative to valsartan 320mg for her (for example is valsartan 160mg, two tablets available).  Thank you, Chago Jones

## 2020-01-09 NOTE — TELEPHONE ENCOUNTER
Hi, please call this in, I cannot e-prescribe it for her, there is an issue with epic.  Orders Placed This Encounter    valsartan (DIOVAN) 160 MG tablet     Thank you, Chago Jones

## 2020-01-27 RX ORDER — POTASSIUM CHLORIDE 20 MEQ/1
TABLET, EXTENDED RELEASE ORAL
Qty: 90 TABLET | Refills: 1 | Status: SHIPPED | OUTPATIENT
Start: 2020-01-27 | End: 2020-01-28

## 2020-01-28 RX ORDER — POTASSIUM CHLORIDE 20 MEQ/1
TABLET, EXTENDED RELEASE ORAL
Qty: 90 TABLET | Refills: 0 | Status: SHIPPED | OUTPATIENT
Start: 2020-01-28 | End: 2020-04-01

## 2020-02-26 ENCOUNTER — TELEPHONE (OUTPATIENT)
Dept: INTERNAL MEDICINE | Facility: CLINIC | Age: 85
End: 2020-02-26

## 2020-02-26 NOTE — TELEPHONE ENCOUNTER
Hi, I should be able to complete the paperwork, has the daughter dropped it off yet?  Thank you, Chago Jones

## 2020-02-26 NOTE — TELEPHONE ENCOUNTER
Called pts daughter Maribel, she states that social security needs paperwork of when pt went to rehab and when she got her.

## 2020-02-26 NOTE — TELEPHONE ENCOUNTER
----- Message from Jadynrosemary King sent at 2/26/2020  8:23 AM CST -----  Contact: Daughter/ Maribel [phone]  Requesting a call concerning her mother getting a form for her to get her handicap tag. Also requesting a call about her getting a letter from the social security office.    Please call and advise.    Thank You

## 2020-02-26 NOTE — TELEPHONE ENCOUNTER
Hi, I will complete the form for a disabled parking spot, the patient herself does not need to go to the DM.  Please ask her what she needs for social security.  Let me know if patient has any questions.  Thank you, Chago Jones

## 2020-02-26 NOTE — LETTER
February 26, 2020    Celina Parra  [address]  [place] [address]             [facility]  [address]  [place] [address]  Phone: [phone]  Fax: [phone] Dear Ms. Parra/To whom it may concern:    This letter is to certify that I am the above named patient's primary care doctor.    Ms. Celina Parra was hopsitalized at [facility] 9/21/2015 - 9/30/2015 and then transferred to [facility] 9/30/15 until around 11/7/2015.    If you have any questions or concerns, please don't hesitate to call.    Sincerely,      MD Chago Montero MD

## 2020-02-27 NOTE — TELEPHONE ENCOUNTER
Hi, I have completed the dmv form and letter for social security. Please let her daughter know that these are ready for .  Let me know if patient has any questions.  Thank you, Chago Jones    I called dtr and she needs ltr for SS abt dates of hosp stays 2015, dates were found.

## 2020-03-30 ENCOUNTER — TELEPHONE (OUTPATIENT)
Dept: INTERNAL MEDICINE | Facility: CLINIC | Age: 85
End: 2020-03-30

## 2020-03-30 NOTE — TELEPHONE ENCOUNTER
----- Message from Gege Dyer sent at 3/30/2020  3:21 PM CDT -----  Contact: karen( daughter)   Pt daughter states pt has an appt tomorrow and would not like to bring her in. Pt daughter states can the dr call her back to give an update on pts condition and whether or not she needs to be seen. Please call and advise.

## 2020-03-30 NOTE — TELEPHONE ENCOUNTER
Called pts daughter karen, she states that pt isn't comfortable with coming into the office. I offered a tele med visit and daughter declined. She states that pt is fine, she had swelling in her leg from not taking fluid pills but she's okay.

## 2020-03-30 NOTE — TELEPHONE ENCOUNTER
Hi, Ok I understand. Please see if the daughter would reschedule her to come in in 2 months,  Let me know if she needs any med refills for in the meantime.  Let me know if patient has any more questions.  Thank you, Chago Jones

## 2020-03-31 NOTE — TELEPHONE ENCOUNTER
Called pts daughter karen, she states that she doesn't know if her mom needs and refills for now but she will call me back and let me know, appt is made for 2 months.

## 2020-04-01 ENCOUNTER — TELEPHONE (OUTPATIENT)
Dept: INTERNAL MEDICINE | Facility: CLINIC | Age: 85
End: 2020-04-01

## 2020-04-01 DIAGNOSIS — I48.20 CHRONIC ATRIAL FIBRILLATION: ICD-10-CM

## 2020-04-01 DIAGNOSIS — I50.22 SYSTOLIC CHF, CHRONIC: Chronic | ICD-10-CM

## 2020-04-01 RX ORDER — HYDROCHLOROTHIAZIDE 25 MG/1
25 TABLET ORAL DAILY
Qty: 90 TABLET | Refills: 0 | Status: SHIPPED | OUTPATIENT
Start: 2020-04-01 | End: 2020-05-04

## 2020-04-01 RX ORDER — BUMETANIDE 1 MG/1
1 TABLET ORAL 2 TIMES DAILY
Qty: 180 TABLET | Refills: 0 | Status: SHIPPED | OUTPATIENT
Start: 2020-04-01 | End: 2020-05-04

## 2020-04-01 RX ORDER — POTASSIUM CHLORIDE 20 MEQ/1
20 TABLET, EXTENDED RELEASE ORAL DAILY
Qty: 90 TABLET | Refills: 0 | Status: SHIPPED | OUTPATIENT
Start: 2020-04-01 | End: 2020-05-04

## 2020-04-01 NOTE — TELEPHONE ENCOUNTER
Pts daughter Maribel called and states pt needs a refill on Xarelto 20 mg, potassium 20 meq, bumetanide 1 mg and hydrochlorothiazide 25 mg

## 2020-04-30 ENCOUNTER — TELEPHONE (OUTPATIENT)
Dept: INTERNAL MEDICINE | Facility: CLINIC | Age: 85
End: 2020-04-30

## 2020-04-30 NOTE — TELEPHONE ENCOUNTER
----- Message from Robyn Gordon sent at 4/30/2020  2:52 PM CDT -----  Contact: Maribel/ daughter [phone]  Patient's daughter called to advise you that her mother is retaining fluid in legs, ankles and feet.     Patient also needs a new plastic mattress . She is having trouble with incontinence especially at night.

## 2020-05-01 NOTE — TELEPHONE ENCOUNTER
Spoke with Maribel about scheduling an appointment for her mother, because of the swelling of her legs. I offered an appointment for Monday but patients daughter stated that she need to return call to the office, because she need to find a  for her kids.

## 2020-05-01 NOTE — TELEPHONE ENCOUNTER
Hi, please call and see if she can bring her mom in to see me on Monday 5/4  Thank you, Chago Jones

## 2020-05-01 NOTE — TELEPHONE ENCOUNTER
----- Message from Janelle Drummond sent at 5/1/2020  2:03 PM CDT -----  Contact: [phone]  Type: Returning a call    Who left a message?   Gema FINN    When did the practice call?   today    Comments:  Please advise, thank you.

## 2020-05-04 ENCOUNTER — OFFICE VISIT (OUTPATIENT)
Dept: INTERNAL MEDICINE | Facility: CLINIC | Age: 85
End: 2020-05-04
Payer: MEDICARE

## 2020-05-04 ENCOUNTER — TELEPHONE (OUTPATIENT)
Dept: INTERNAL MEDICINE | Facility: CLINIC | Age: 85
End: 2020-05-04

## 2020-05-04 ENCOUNTER — LAB VISIT (OUTPATIENT)
Dept: LAB | Facility: HOSPITAL | Age: 85
End: 2020-05-04
Attending: INTERNAL MEDICINE
Payer: MEDICARE

## 2020-05-04 VITALS
BODY MASS INDEX: 36.65 KG/M2 | WEIGHT: 220 LBS | OXYGEN SATURATION: 97 % | SYSTOLIC BLOOD PRESSURE: 110 MMHG | HEART RATE: 80 BPM | HEIGHT: 65 IN | DIASTOLIC BLOOD PRESSURE: 62 MMHG | TEMPERATURE: 98 F

## 2020-05-04 DIAGNOSIS — E66.01 MORBID (SEVERE) OBESITY DUE TO EXCESS CALORIES: ICD-10-CM

## 2020-05-04 DIAGNOSIS — I48.20 CHRONIC ATRIAL FIBRILLATION: ICD-10-CM

## 2020-05-04 DIAGNOSIS — L97.929 VENOUS STASIS ULCER OF LEFT LOWER EXTREMITY: ICD-10-CM

## 2020-05-04 DIAGNOSIS — H54.7 DECREASED VISION: ICD-10-CM

## 2020-05-04 DIAGNOSIS — R73.09 ELEVATED HEMOGLOBIN A1C: ICD-10-CM

## 2020-05-04 DIAGNOSIS — I50.22 SYSTOLIC CHF, CHRONIC: Chronic | ICD-10-CM

## 2020-05-04 DIAGNOSIS — L97.923: ICD-10-CM

## 2020-05-04 DIAGNOSIS — Z74.09 IMMOBILITY: ICD-10-CM

## 2020-05-04 DIAGNOSIS — I83.029 VENOUS STASIS ULCER OF LEFT LOWER EXTREMITY: ICD-10-CM

## 2020-05-04 DIAGNOSIS — I10 ESSENTIAL HYPERTENSION: Primary | ICD-10-CM

## 2020-05-04 LAB
ALBUMIN SERPL BCP-MCNC: 3.5 G/DL (ref 3.5–5.2)
ALP SERPL-CCNC: 70 U/L (ref 55–135)
ALT SERPL W/O P-5'-P-CCNC: <5 U/L (ref 10–44)
ANION GAP SERPL CALC-SCNC: 9 MMOL/L (ref 8–16)
AST SERPL-CCNC: 16 U/L (ref 10–40)
BASOPHILS # BLD AUTO: 0.05 K/UL (ref 0–0.2)
BASOPHILS NFR BLD: 1 % (ref 0–1.9)
BILIRUB SERPL-MCNC: 0.6 MG/DL (ref 0.1–1)
BUN SERPL-MCNC: 30 MG/DL (ref 8–23)
CALCIUM SERPL-MCNC: 9.9 MG/DL (ref 8.7–10.5)
CHLORIDE SERPL-SCNC: 101 MMOL/L (ref 95–110)
CHOLEST SERPL-MCNC: 147 MG/DL (ref 120–199)
CHOLEST/HDLC SERPL: 3.3 {RATIO} (ref 2–5)
CO2 SERPL-SCNC: 31 MMOL/L (ref 23–29)
CREAT SERPL-MCNC: 1.3 MG/DL (ref 0.5–1.4)
DIFFERENTIAL METHOD: ABNORMAL
EOSINOPHIL # BLD AUTO: 0.2 K/UL (ref 0–0.5)
EOSINOPHIL NFR BLD: 2.9 % (ref 0–8)
ERYTHROCYTE [DISTWIDTH] IN BLOOD BY AUTOMATED COUNT: 16.7 % (ref 11.5–14.5)
EST. GFR  (AFRICAN AMERICAN): 42.9 ML/MIN/1.73 M^2
EST. GFR  (NON AFRICAN AMERICAN): 37.2 ML/MIN/1.73 M^2
ESTIMATED AVG GLUCOSE: 97 MG/DL (ref 68–131)
GLUCOSE SERPL-MCNC: 85 MG/DL (ref 70–110)
HBA1C MFR BLD HPLC: 5 % (ref 4–5.6)
HCT VFR BLD AUTO: 29.5 % (ref 37–48.5)
HDLC SERPL-MCNC: 45 MG/DL (ref 40–75)
HDLC SERPL: 30.6 % (ref 20–50)
HGB BLD-MCNC: 8.6 G/DL (ref 12–16)
IMM GRANULOCYTES # BLD AUTO: 0.01 K/UL (ref 0–0.04)
IMM GRANULOCYTES NFR BLD AUTO: 0.2 % (ref 0–0.5)
LDLC SERPL CALC-MCNC: 89.2 MG/DL (ref 63–159)
LYMPHOCYTES # BLD AUTO: 0.7 K/UL (ref 1–4.8)
LYMPHOCYTES NFR BLD: 14.5 % (ref 18–48)
MCH RBC QN AUTO: 26.3 PG (ref 27–31)
MCHC RBC AUTO-ENTMCNC: 29.2 G/DL (ref 32–36)
MCV RBC AUTO: 90 FL (ref 82–98)
MONOCYTES # BLD AUTO: 0.5 K/UL (ref 0.3–1)
MONOCYTES NFR BLD: 9.2 % (ref 4–15)
NEUTROPHILS # BLD AUTO: 3.7 K/UL (ref 1.8–7.7)
NEUTROPHILS NFR BLD: 72.2 % (ref 38–73)
NONHDLC SERPL-MCNC: 102 MG/DL
NRBC BLD-RTO: 0 /100 WBC
PLATELET # BLD AUTO: 221 K/UL (ref 150–350)
PMV BLD AUTO: 11.1 FL (ref 9.2–12.9)
POTASSIUM SERPL-SCNC: 3.7 MMOL/L (ref 3.5–5.1)
PROT SERPL-MCNC: 8.2 G/DL (ref 6–8.4)
RBC # BLD AUTO: 3.27 M/UL (ref 4–5.4)
SODIUM SERPL-SCNC: 141 MMOL/L (ref 136–145)
TRIGL SERPL-MCNC: 64 MG/DL (ref 30–150)
WBC # BLD AUTO: 5.1 K/UL (ref 3.9–12.7)

## 2020-05-04 PROCEDURE — 80061 LIPID PANEL: CPT

## 2020-05-04 PROCEDURE — 99214 PR OFFICE/OUTPT VISIT, EST, LEVL IV, 30-39 MIN: ICD-10-PCS | Mod: S$GLB,,, | Performed by: INTERNAL MEDICINE

## 2020-05-04 PROCEDURE — 36415 COLL VENOUS BLD VENIPUNCTURE: CPT

## 2020-05-04 PROCEDURE — 1126F AMNT PAIN NOTED NONE PRSNT: CPT | Mod: S$GLB,,, | Performed by: INTERNAL MEDICINE

## 2020-05-04 PROCEDURE — 99214 OFFICE O/P EST MOD 30 MIN: CPT | Mod: S$GLB,,, | Performed by: INTERNAL MEDICINE

## 2020-05-04 PROCEDURE — 99499 UNLISTED E&M SERVICE: CPT | Mod: S$GLB,,, | Performed by: INTERNAL MEDICINE

## 2020-05-04 PROCEDURE — 1101F PR PT FALLS ASSESS DOC 0-1 FALLS W/OUT INJ PAST YR: ICD-10-PCS | Mod: CPTII,S$GLB,, | Performed by: INTERNAL MEDICINE

## 2020-05-04 PROCEDURE — 83036 HEMOGLOBIN GLYCOSYLATED A1C: CPT

## 2020-05-04 PROCEDURE — 99499 RISK ADDL DX/OHS AUDIT: ICD-10-PCS | Mod: S$GLB,,, | Performed by: INTERNAL MEDICINE

## 2020-05-04 PROCEDURE — 99999 PR PBB SHADOW E&M-EST. PATIENT-LVL IV: ICD-10-PCS | Mod: PBBFAC,,, | Performed by: INTERNAL MEDICINE

## 2020-05-04 PROCEDURE — 80053 COMPREHEN METABOLIC PANEL: CPT

## 2020-05-04 PROCEDURE — 1159F MED LIST DOCD IN RCRD: CPT | Mod: S$GLB,,, | Performed by: INTERNAL MEDICINE

## 2020-05-04 PROCEDURE — 99999 PR PBB SHADOW E&M-EST. PATIENT-LVL IV: CPT | Mod: PBBFAC,,, | Performed by: INTERNAL MEDICINE

## 2020-05-04 PROCEDURE — 85025 COMPLETE CBC W/AUTO DIFF WBC: CPT

## 2020-05-04 PROCEDURE — 1159F PR MEDICATION LIST DOCUMENTED IN MEDICAL RECORD: ICD-10-PCS | Mod: S$GLB,,, | Performed by: INTERNAL MEDICINE

## 2020-05-04 PROCEDURE — 1101F PT FALLS ASSESS-DOCD LE1/YR: CPT | Mod: CPTII,S$GLB,, | Performed by: INTERNAL MEDICINE

## 2020-05-04 PROCEDURE — 1126F PR PAIN SEVERITY QUANTIFIED, NO PAIN PRESENT: ICD-10-PCS | Mod: S$GLB,,, | Performed by: INTERNAL MEDICINE

## 2020-05-04 RX ORDER — METOPROLOL SUCCINATE 50 MG/1
150 TABLET, EXTENDED RELEASE ORAL DAILY
Qty: 270 TABLET | Refills: 11 | Status: SHIPPED | OUTPATIENT
Start: 2020-05-04 | End: 2020-12-03

## 2020-05-04 RX ORDER — POTASSIUM CHLORIDE 20 MEQ/1
20 TABLET, EXTENDED RELEASE ORAL DAILY
Qty: 90 TABLET | Refills: 11 | Status: SHIPPED | OUTPATIENT
Start: 2020-05-04 | End: 2021-01-01

## 2020-05-04 RX ORDER — HYDROCHLOROTHIAZIDE 25 MG/1
25 TABLET ORAL DAILY
Qty: 90 TABLET | Refills: 11 | Status: SHIPPED | OUTPATIENT
Start: 2020-05-04 | End: 2020-07-27

## 2020-05-04 RX ORDER — FLUTICASONE PROPIONATE 50 MCG
2 SPRAY, SUSPENSION (ML) NASAL DAILY
Qty: 16 G | Refills: 11 | Status: SHIPPED | OUTPATIENT
Start: 2020-05-04 | End: 2021-01-01 | Stop reason: SDUPTHER

## 2020-05-04 RX ORDER — AMLODIPINE BESYLATE 10 MG/1
10 TABLET ORAL DAILY
Qty: 90 TABLET | Refills: 11 | Status: SHIPPED | OUTPATIENT
Start: 2020-05-04 | End: 2021-01-01

## 2020-05-04 RX ORDER — BUMETANIDE 1 MG/1
1 TABLET ORAL 2 TIMES DAILY
Qty: 180 TABLET | Refills: 11 | Status: SHIPPED | OUTPATIENT
Start: 2020-05-04 | End: 2021-01-01

## 2020-05-04 NOTE — PROGRESS NOTES
Subjective:       Patient ID: Celina Parra is a 86 y.o. female.    Chief Complaint: Leg Swelling    Patient is here for followup for chronic conditions.    chronic sore throat.    She has crusts chronically on her eyelids.    Off and on leg venous sores, now worsening sore L lower leg. No f/c/ns. RLE is clear at this time.    Taking meds as rxed.    Needs some supplies -- new WC and mattress.    Review of Systems   Constitutional: Positive for appetite change. Negative for activity change, diaphoresis, fatigue, fever and unexpected weight change.   HENT: Positive for congestion.    Eyes: Positive for itching.   Respiratory: Negative for chest tightness and wheezing.    Cardiovascular: Positive for leg swelling (worsened with weeping). Negative for chest pain and palpitations.   Gastrointestinal: Negative for abdominal distention, abdominal pain, blood in stool, constipation, diarrhea, nausea and vomiting.        Some dysphagia, not severe, no odyno   Genitourinary: Negative for decreased urine volume and difficulty urinating.        Has large volume noc time incont   Musculoskeletal: Positive for arthralgias and gait problem. Negative for back pain, joint swelling, neck pain and neck stiffness.   Skin: Positive for rash (around LLE sore) and wound (L lower anterior leg venous ulcer).   Neurological: Positive for weakness. Negative for syncope and headaches.   Hematological: Bruises/bleeds easily.   Psychiatric/Behavioral: Negative for confusion.       Objective:      Physical Exam   Constitutional: She is oriented to person, place, and time. She appears well-developed and well-nourished. No distress.   Not toxic appearing.  Very weak and difficult even to stand to have wt checked   HENT:   Head: Normocephalic and atraumatic.   Mouth/Throat: No oropharyngeal exudate.   Sinuses nontender   Eyes: Pupils are equal, round, and reactive to light. No scleral icterus.   Cardiovascular: Normal rate.   No murmur  heard.  Irregularly irregular     Pulmonary/Chest: Effort normal and breath sounds normal.   Abdominal: Soft. Bowel sounds are normal. She exhibits no distension and no mass. There is no tenderness. There is no rebound and no guarding. No hernia.   Musculoskeletal:   bilat very swollen legs (3+ with pitting and weeping) with chronic venous stasis and coalescent open wounds left lower legs.  No purulence or redness    Feet with onychogryphosis in nails   Neurological: She is alert and oriented to person, place, and time.   Unable to weight bear alone, bilat prox muscles 4/5 strength to resistant testing.       Skin: Skin is warm and dry. She is not diaphoretic.   Psychiatric: She has a normal mood and affect. Her behavior is normal.       Assessment:       1. Essential hypertension    2. Chronic atrial fibrillation    3. Systolic CHF, chronic    4. 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 BMI 38.0-38.9,adult    6. Elevated hemoglobin A1c    7. Immobility    8. Decreased vision        Plan:       Celina was seen today for leg swelling.    Diagnoses and all orders for this visit:    Essential hypertension  -     potassium chloride SA (K-DUR,KLOR-CON) 20 MEQ tablet; Take 1 tablet (20 mEq total) by mouth once daily.  -     amLODIPine (NORVASC) 10 MG tablet; Take 1 tablet (10 mg total) by mouth once daily.  -     Ambulatory referral/consult to Home Health; Future  -     HME - OTHER  Hold off on valsartan since she has not been on it and her pressure is not elevated today.  See back labs, consider back on it since she has a low LVEF    Chronic atrial fibrillation  -     rivaroxaban (XARELTO) 20 mg Tab; Take 1 tablet (20 mg total) by mouth once daily.  Has good rate control  -     Ambulatory referral/consult to Home Health; Future  -     WHEELCHAIR FOR HOME USE  -     HME - OTHER    Systolic CHF, chronic  -     CBC auto differential; Future  -     Comprehensive metabolic panel;  Future  -     Lipid Panel; Future  -     potassium chloride SA (K-DUR,KLOR-CON) 20 MEQ tablet; Take 1 tablet (20 mEq total) by mouth once daily.  -     metoprolol succinate (TOPROL-XL) 50 MG 24 hr tablet; Take 3 tablets (150 mg total) by mouth once daily.  -     hydroCHLOROthiazide (HYDRODIURIL) 25 MG tablet; Take 1 tablet (25 mg total) by mouth once daily.  -     bumetanide (BUMEX) 1 MG tablet; Take 1 tablet (1 mg total) by mouth 2 (two) times daily.  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  Explained that if not better in 1-2 weeks, pt should rtc/call PCP then see wound clinic at [facility]    BMI 38.0-38.9,adult  -     Ambulatory referral/consult to Home Health; Future  -     WHEELCHAIR FOR HOME USE  -     HME - OTHER    Elevated hemoglobin A1c  -     Hemoglobin A1C; Future    Immobility  -     WHEELCHAIR FOR HOME USE  -     HME - OTHER    Decreased vision  -     Ambulatory referral/consult to Optometry; Future    Other orders  -     fluticasone propionate (FLONASE) 50 mcg/actuation nasal spray; 2 sprays (100 mcg total) by Each Nostril route once daily.        Health Maintenance       Date Due Completion Date    Shingles Vaccine (1 of 2) 05/28/1983 ---    Influenza Vaccine (Season Ended) 09/01/2020 2/27/2019    Lipid Panel 02/27/2024 2/27/2019    TETANUS VACCINE 02/27/2029 2/27/2019        Discussed code status -- she is FC but would not want prolonged invasive measures beyond a few days    Follow up in about 2 months (around  7/4/2020).    Future Appointments   Date Time Provider Department Center   5/20/2020  9:30 AM Alem Esposito OD [facility] Christian ASENCIO   6/8/2020  3:20 PM Chago Jones MD [facility] IM Christian DALTONW

## 2020-05-04 NOTE — TELEPHONE ENCOUNTER
Hi, please fax to [facility] -- home health, wheelchair, and mattress orders, along with the face sheet I just printed.  Thank you, Chago Jones

## 2020-05-04 NOTE — TELEPHONE ENCOUNTER
----- Message from Orville Simmons sent at 5/4/2020  9:54 AM CDT -----  Contact: Maribel Daughter [phone]  Maribel will like to speak to the nurse in regards to appt today, please advise.

## 2020-05-07 ENCOUNTER — TELEPHONE (OUTPATIENT)
Dept: INTERNAL MEDICINE | Facility: CLINIC | Age: 85
End: 2020-05-07

## 2020-05-07 DIAGNOSIS — I48.20 CHRONIC ATRIAL FIBRILLATION: Primary | ICD-10-CM

## 2020-05-07 DIAGNOSIS — L97.923: ICD-10-CM

## 2020-05-07 DIAGNOSIS — L97.929 VENOUS STASIS ULCER OF LEFT LOWER EXTREMITY: ICD-10-CM

## 2020-05-07 DIAGNOSIS — E66.01 MORBID (SEVERE) OBESITY DUE TO EXCESS CALORIES: ICD-10-CM

## 2020-05-07 DIAGNOSIS — I50.22 SYSTOLIC CHF, CHRONIC: ICD-10-CM

## 2020-05-07 DIAGNOSIS — I83.029 VENOUS STASIS ULCER OF LEFT LOWER EXTREMITY: ICD-10-CM

## 2020-05-07 PROCEDURE — G0180 MD CERTIFICATION HHA PATIENT: HCPCS | Mod: ,,, | Performed by: INTERNAL MEDICINE

## 2020-05-07 PROCEDURE — G0180 PR HOME HEALTH MD CERTIFICATION: ICD-10-PCS | Mod: ,,, | Performed by: INTERNAL MEDICINE

## 2020-05-07 NOTE — TELEPHONE ENCOUNTER
The order for plastic mattress has been cancelled because they don't have any kind of mattress like that.  They don't understand what you mean or what you are trying to order,    Thanks     ajay

## 2020-05-07 NOTE — TELEPHONE ENCOUNTER
Hi,   Please call the daughter and ask if she has a hospital bed. If she does I will change to change the order to a mattress for a hospital bed.    Thank you, Chago Jones

## 2020-05-07 NOTE — TELEPHONE ENCOUNTER
----- Message from Robyn Zendejas sent at 5/7/2020 10:29 AM CDT -----  Contact: Crowdzu/bruce/[phone]  Rep called in regards to getting clarification on the order for spastic mattress. She would like a call back.     Please advise

## 2020-05-20 ENCOUNTER — OFFICE VISIT (OUTPATIENT)
Dept: OPTOMETRY | Facility: CLINIC | Age: 85
End: 2020-05-20
Payer: MEDICARE

## 2020-05-20 DIAGNOSIS — H40.013 OAG (OPEN ANGLE GLAUCOMA) SUSPECT, LOW RISK, BILATERAL: ICD-10-CM

## 2020-05-20 DIAGNOSIS — Z96.1 PSEUDOPHAKIA OF LEFT EYE: ICD-10-CM

## 2020-05-20 DIAGNOSIS — H26.9 CORTICAL CATARACT OF RIGHT EYE: Primary | ICD-10-CM

## 2020-05-20 DIAGNOSIS — H01.004 BLEPHARITIS OF UPPER EYELIDS OF BOTH EYES, UNSPECIFIED TYPE: ICD-10-CM

## 2020-05-20 DIAGNOSIS — H01.001 BLEPHARITIS OF UPPER EYELIDS OF BOTH EYES, UNSPECIFIED TYPE: ICD-10-CM

## 2020-05-20 DIAGNOSIS — H54.7 DECREASED VISION: ICD-10-CM

## 2020-05-20 DIAGNOSIS — H04.123 DRY EYE SYNDROME OF BOTH EYES: ICD-10-CM

## 2020-05-20 PROCEDURE — 92004 PR EYE EXAM, NEW PATIENT,COMPREHESV: ICD-10-PCS | Mod: S$GLB,,, | Performed by: OPTOMETRIST

## 2020-05-20 PROCEDURE — 92004 COMPRE OPH EXAM NEW PT 1/>: CPT | Mod: S$GLB,,, | Performed by: OPTOMETRIST

## 2020-05-20 PROCEDURE — 99999 PR PBB SHADOW E&M-EST. PATIENT-LVL II: CPT | Mod: PBBFAC,,, | Performed by: OPTOMETRIST

## 2020-05-20 PROCEDURE — 99999 PR PBB SHADOW E&M-EST. PATIENT-LVL II: ICD-10-PCS | Mod: PBBFAC,,, | Performed by: OPTOMETRIST

## 2020-05-20 NOTE — PROGRESS NOTES
HPI     MsKye Parra was referred by PCP for an eye exam.    Patient complains of itching OU. Uses a damp clothe over eyes. FB   sensation OS. Started 1 month ago. No drops. Pt is here today with her   daughter Maribel, she does not live with her.  Pt. States she has noticed in the past month she can't see out of right   eye.    Would patient like a refraction today? no     Paitent denies diplopia, headaches, flashes/floaters, redness, and pain.    (-)drops  (-)Diabetes      OCULAR HISTORY  Last Eye Exam: 2014 with CE Dr Ortega OS. Has not been seen since Sx day.   Did not attend post-op visits  (+)eye surgery, CE OS   (+)diagnosed or treated for any eye conditions or diseases, glaucoma    FAMILY HISTORY  (-)Glaucoma        Last edited by Alem Esposito, OD on 5/20/2020  1:01 PM. (History)            Assessment /Plan     For exam results, see Encounter Report.    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 Educated pt. White cataract, OD. NLP (?) OD.... Pt notes she cannot differentiate if light is on or off. Refer to Dr. Ortega for CE.   Discussed importance of attending post-op appointments.     3. PCIOL clear and centered OS. Monitor yearly.     4-5. Educated pt and daughter on findings. Recommend J&J baby shampoo/lid scrubs along eyelid margin Qday + ATs TID-QID. Monitor.     6. H/o POAG in the past. ONH pallor. IOP WNL. (-)FHx. Will perform f/u OCT after cataract surgery.   Grossly normal visual field OD --- slightly constricted superiorly but significant ptosis playing a role. Unsure if patient could perform HVF due to poor understanding of directions.   H/o CVA. Determine POAG vs stroke related. Possible Tx determined at that time.       RTC with Dr. Ortega, me  yearly or prn.

## 2020-05-20 NOTE — LETTER
May 20, 2020      Chago Jones MD  [address]  [place] [address]           [facility]  [address]  [facility] [address]  Phone: [phone]          Patient: Celina Parra   MR Number: [id number]   YOB: 1933   Date of Visit: 5/20/2020       Dear Dr. Chago Jones:    Thank you for referring Celina Parra to me for evaluation. Attached you will find relevant portions of my assessment and plan of care.    If you have questions, please do not hesitate to call me. I look forward to following Celina Parra along with you.    Sincerely,    Alem Esposito, OD    Enclosure  CC:  No Recipients    If you would like to receive this communication electronically, please contact [email or web address] or [phone] to request more information on [employer] access.    For providers and/or their staff who would like to refer a patient to [facility], please contact us through our one-stop-shop provider referral line, Deborah Martin, at [phone].    If you feel you have received this communication in error or would no longer like to receive these types of communications, please e-mail [email or web address]

## 2020-06-01 ENCOUNTER — EXTERNAL HOME HEALTH (OUTPATIENT)
Dept: HOME HEALTH SERVICES | Facility: HOSPITAL | Age: 85
End: 2020-06-01
Payer: MEDICARE

## 2020-06-23 ENCOUNTER — DOCUMENT SCAN (OUTPATIENT)
Dept: HOME HEALTH SERVICES | Facility: HOSPITAL | Age: 85
End: 2020-06-23
Payer: MEDICARE

## 2020-07-25 DIAGNOSIS — I50.22 SYSTOLIC CHF, CHRONIC: Chronic | ICD-10-CM

## 2020-07-27 RX ORDER — HYDROCHLOROTHIAZIDE 25 MG/1
TABLET ORAL
Qty: 90 TABLET | Refills: 0 | Status: SHIPPED | OUTPATIENT
Start: 2020-07-27 | End: 2020-09-30

## 2020-09-11 ENCOUNTER — TELEPHONE (OUTPATIENT)
Dept: INTERNAL MEDICINE | Facility: CLINIC | Age: 85
End: 2020-09-11

## 2020-09-11 NOTE — TELEPHONE ENCOUNTER
----- Message from Jadyn Fernando sent at 9/11/2020  4:08 PM CDT -----  Contact: Daughter [phone]  Daughter is requesting a nurse to take a look at her mother's leg which are swollen. Daughter thinks she had [facility].    Please call and advise.    Thank You

## 2020-09-14 ENCOUNTER — TELEPHONE (OUTPATIENT)
Dept: INTERNAL MEDICINE | Facility: CLINIC | Age: 85
End: 2020-09-14

## 2020-09-14 NOTE — TELEPHONE ENCOUNTER
----- Message from Carmen Velazquez sent at 9/14/2020  3:33 PM CDT -----  Regarding: please call  Contact: pascual [phone]  Patient has additional questons

## 2020-09-14 NOTE — TELEPHONE ENCOUNTER
----- Message from Tessie Herrera sent at 9/14/2020  3:20 PM CDT -----  Contact: Daughter [phone]  Patient is returning a phone call.  Who left a message for the patient: Lalo Astudillo MA  Does patient know what this is regarding:  no  Comments:

## 2020-09-14 NOTE — TELEPHONE ENCOUNTER
Hi, I would need to see her back in the clinic before I can  order home health for her.  Please offer an appointment  Thank you, Chago Jones

## 2020-09-15 NOTE — TELEPHONE ENCOUNTER
Spoke with pt's daughter. She states that the pt's leg is still a little swollen, but no additional swelling has happened. The pt had blisters on her leg which have bust and she is cleaning the site with alcohol and patting it dry with gauze.

## 2020-09-16 ENCOUNTER — PATIENT OUTREACH (OUTPATIENT)
Dept: ADMINISTRATIVE | Facility: HOSPITAL | Age: 85
End: 2020-09-16

## 2020-09-16 NOTE — PROGRESS NOTES
Health Maintenance Due   Topic Date Due    Shingles Vaccine (1 of 2) [birth year]1983    Influenza Vaccine (1) 08/01/2020     Chart review completed.

## 2020-09-30 ENCOUNTER — IMMUNIZATION (OUTPATIENT)
Dept: PHARMACY | Facility: CLINIC | Age: 85
End: 2020-09-30
Payer: MEDICARE

## 2020-09-30 ENCOUNTER — LAB VISIT (OUTPATIENT)
Dept: LAB | Facility: HOSPITAL | Age: 85
End: 2020-09-30
Attending: INTERNAL MEDICINE
Payer: MEDICARE

## 2020-09-30 ENCOUNTER — IMMUNIZATION (OUTPATIENT)
Dept: INTERNAL MEDICINE | Facility: CLINIC | Age: 85
End: 2020-09-30
Payer: MEDICARE

## 2020-09-30 ENCOUNTER — OFFICE VISIT (OUTPATIENT)
Dept: INTERNAL MEDICINE | Facility: CLINIC | Age: 85
End: 2020-09-30
Payer: MEDICARE

## 2020-09-30 VITALS
SYSTOLIC BLOOD PRESSURE: 116 MMHG | HEART RATE: 83 BPM | WEIGHT: 220 LBS | BODY MASS INDEX: 36.65 KG/M2 | OXYGEN SATURATION: 99 % | HEIGHT: 65 IN | DIASTOLIC BLOOD PRESSURE: 72 MMHG

## 2020-09-30 DIAGNOSIS — I10 ESSENTIAL HYPERTENSION: ICD-10-CM

## 2020-09-30 DIAGNOSIS — E66.01 MORBID (SEVERE) OBESITY DUE TO EXCESS CALORIES: ICD-10-CM

## 2020-09-30 DIAGNOSIS — I50.22 SYSTOLIC CHF, CHRONIC: ICD-10-CM

## 2020-09-30 DIAGNOSIS — N18.30 CKD (CHRONIC KIDNEY DISEASE) STAGE 3, GFR 30-59 ML/MIN: ICD-10-CM

## 2020-09-30 DIAGNOSIS — L97.923: ICD-10-CM

## 2020-09-30 DIAGNOSIS — D64.89 OTHER SPECIFIED ANEMIAS: ICD-10-CM

## 2020-09-30 DIAGNOSIS — R79.9 ABNORMAL FINDING OF BLOOD CHEMISTRY, UNSPECIFIED: ICD-10-CM

## 2020-09-30 DIAGNOSIS — I48.20 CHRONIC ATRIAL FIBRILLATION: Primary | ICD-10-CM

## 2020-09-30 LAB
ALBUMIN SERPL BCP-MCNC: 3.7 G/DL (ref 3.5–5.2)
ALP SERPL-CCNC: 63 U/L (ref 55–135)
ALT SERPL W/O P-5'-P-CCNC: <5 U/L (ref 10–44)
ANION GAP SERPL CALC-SCNC: 10 MMOL/L (ref 8–16)
AST SERPL-CCNC: 15 U/L (ref 10–40)
BASOPHILS # BLD AUTO: 0.05 K/UL (ref 0–0.2)
BASOPHILS NFR BLD: 1.2 % (ref 0–1.9)
BILIRUB SERPL-MCNC: 1 MG/DL (ref 0.1–1)
BUN SERPL-MCNC: 21 MG/DL (ref 8–23)
CALCIUM SERPL-MCNC: 10 MG/DL (ref 8.7–10.5)
CHLORIDE SERPL-SCNC: 102 MMOL/L (ref 95–110)
CO2 SERPL-SCNC: 28 MMOL/L (ref 23–29)
CREAT SERPL-MCNC: 1.1 MG/DL (ref 0.5–1.4)
DIFFERENTIAL METHOD: ABNORMAL
EOSINOPHIL # BLD AUTO: 0.1 K/UL (ref 0–0.5)
EOSINOPHIL NFR BLD: 2.5 % (ref 0–8)
ERYTHROCYTE [DISTWIDTH] IN BLOOD BY AUTOMATED COUNT: 16.9 % (ref 11.5–14.5)
EST. GFR  (AFRICAN AMERICAN): 52.2 ML/MIN/1.73 M^2
EST. GFR  (NON AFRICAN AMERICAN): 45.3 ML/MIN/1.73 M^2
FERRITIN SERPL-MCNC: 24 NG/ML (ref 20–300)
GLUCOSE SERPL-MCNC: 99 MG/DL (ref 70–110)
HCT VFR BLD AUTO: 32.7 % (ref 37–48.5)
HGB BLD-MCNC: 9.6 G/DL (ref 12–16)
IMM GRANULOCYTES # BLD AUTO: 0.01 K/UL (ref 0–0.04)
IMM GRANULOCYTES NFR BLD AUTO: 0.2 % (ref 0–0.5)
IRON SERPL-MCNC: 28 UG/DL (ref 30–160)
LYMPHOCYTES # BLD AUTO: 0.8 K/UL (ref 1–4.8)
LYMPHOCYTES NFR BLD: 20.4 % (ref 18–48)
MCH RBC QN AUTO: 26.2 PG (ref 27–31)
MCHC RBC AUTO-ENTMCNC: 29.4 G/DL (ref 32–36)
MCV RBC AUTO: 89 FL (ref 82–98)
MONOCYTES # BLD AUTO: 0.5 K/UL (ref 0.3–1)
MONOCYTES NFR BLD: 11.6 % (ref 4–15)
NEUTROPHILS # BLD AUTO: 2.6 K/UL (ref 1.8–7.7)
NEUTROPHILS NFR BLD: 64.1 % (ref 38–73)
NRBC BLD-RTO: 0 /100 WBC
PLATELET # BLD AUTO: 211 K/UL (ref 150–350)
PMV BLD AUTO: 11.6 FL (ref 9.2–12.9)
POTASSIUM SERPL-SCNC: 3.6 MMOL/L (ref 3.5–5.1)
PROT SERPL-MCNC: 7.8 G/DL (ref 6–8.4)
RBC # BLD AUTO: 3.66 M/UL (ref 4–5.4)
SATURATED IRON: 7 % (ref 20–50)
SODIUM SERPL-SCNC: 140 MMOL/L (ref 136–145)
TOTAL IRON BINDING CAPACITY: 420 UG/DL (ref 250–450)
TRANSFERRIN SERPL-MCNC: 284 MG/DL (ref 200–375)
WBC # BLD AUTO: 4.06 K/UL (ref 3.9–12.7)

## 2020-09-30 PROCEDURE — 82728 ASSAY OF FERRITIN: CPT

## 2020-09-30 PROCEDURE — 36415 COLL VENOUS BLD VENIPUNCTURE: CPT

## 2020-09-30 PROCEDURE — 99999 PR PBB SHADOW E&M-EST. PATIENT-LVL IV: ICD-10-PCS | Mod: PBBFAC,,, | Performed by: INTERNAL MEDICINE

## 2020-09-30 PROCEDURE — 1126F AMNT PAIN NOTED NONE PRSNT: CPT | Mod: S$GLB,,, | Performed by: INTERNAL MEDICINE

## 2020-09-30 PROCEDURE — 90694 VACC AIIV4 NO PRSRV 0.5ML IM: CPT | Mod: S$GLB,,, | Performed by: INTERNAL MEDICINE

## 2020-09-30 PROCEDURE — 85025 COMPLETE CBC W/AUTO DIFF WBC: CPT

## 2020-09-30 PROCEDURE — 99499 RISK ADDL DX/OHS AUDIT: ICD-10-PCS | Mod: S$GLB,,, | Performed by: INTERNAL MEDICINE

## 2020-09-30 PROCEDURE — 99999 PR PBB SHADOW E&M-EST. PATIENT-LVL IV: CPT | Mod: PBBFAC,,, | Performed by: INTERNAL MEDICINE

## 2020-09-30 PROCEDURE — 99499 UNLISTED E&M SERVICE: CPT | Mod: S$GLB,,, | Performed by: INTERNAL MEDICINE

## 2020-09-30 PROCEDURE — 80053 COMPREHEN METABOLIC PANEL: CPT

## 2020-09-30 PROCEDURE — 99214 OFFICE O/P EST MOD 30 MIN: CPT | Mod: 25,S$GLB,, | Performed by: INTERNAL MEDICINE

## 2020-09-30 PROCEDURE — 1126F PR PAIN SEVERITY QUANTIFIED, NO PAIN PRESENT: ICD-10-PCS | Mod: S$GLB,,, | Performed by: INTERNAL MEDICINE

## 2020-09-30 PROCEDURE — 1101F PT FALLS ASSESS-DOCD LE1/YR: CPT | Mod: CPTII,S$GLB,, | Performed by: INTERNAL MEDICINE

## 2020-09-30 PROCEDURE — G0008 ADMIN INFLUENZA VIRUS VAC: HCPCS | Mod: S$GLB,,, | Performed by: INTERNAL MEDICINE

## 2020-09-30 PROCEDURE — G0008 FLU VACCINE - QUADRIVALENT - ADJUVANTED: ICD-10-PCS | Mod: S$GLB,,, | Performed by: INTERNAL MEDICINE

## 2020-09-30 PROCEDURE — 1101F PR PT FALLS ASSESS DOC 0-1 FALLS W/OUT INJ PAST YR: ICD-10-PCS | Mod: CPTII,S$GLB,, | Performed by: INTERNAL MEDICINE

## 2020-09-30 PROCEDURE — 1159F PR MEDICATION LIST DOCUMENTED IN MEDICAL RECORD: ICD-10-PCS | Mod: S$GLB,,, | Performed by: INTERNAL MEDICINE

## 2020-09-30 PROCEDURE — 99214 PR OFFICE/OUTPT VISIT, EST, LEVL IV, 30-39 MIN: ICD-10-PCS | Mod: 25,S$GLB,, | Performed by: INTERNAL MEDICINE

## 2020-09-30 PROCEDURE — 83540 ASSAY OF IRON: CPT

## 2020-09-30 PROCEDURE — 90694 FLU VACCINE - QUADRIVALENT - ADJUVANTED: ICD-10-PCS | Mod: S$GLB,,, | Performed by: INTERNAL MEDICINE

## 2020-09-30 PROCEDURE — 1159F MED LIST DOCD IN RCRD: CPT | Mod: S$GLB,,, | Performed by: INTERNAL MEDICINE

## 2020-09-30 NOTE — PROGRESS NOTES
Subjective:       Patient ID: Celina Parra is a 87 y.o. female.    Chief Complaint: Follow-up    Patient is here for followup for chronic conditions.    Loss of appetite, not eating as much.    Pharmacist abt her taking 2 diff diuretics. She has not actually been taking HCTZ.    Her leg sores have eased and no new sores and less serous drainage.    Review of Systems   Constitutional: Positive for appetite change. Negative for activity change, diaphoresis, fatigue, fever and unexpected weight change.   HENT: Positive for congestion (stable).    Eyes: Positive for itching.   Respiratory: Negative for chest tightness and wheezing.    Cardiovascular: Positive for leg swelling (improved and minimal weeping now). Negative for chest pain and palpitations.   Gastrointestinal: Negative for abdominal distention, abdominal pain, blood in stool, constipation, diarrhea, nausea and vomiting.        Some dysphagia, not severe, no odyno   Genitourinary: Negative for decreased urine volume and difficulty urinating.        Has large volume noc time incont   Musculoskeletal: Positive for arthralgias and gait problem. Negative for back pain, joint swelling, neck pain and neck stiffness.   Skin: Positive for rash (around LLE sore) and wound (L lower anterior leg venous ulcer).   Neurological: Positive for weakness. Negative for syncope and headaches.   Hematological: Bruises/bleeds easily.   Psychiatric/Behavioral: Negative for confusion.           Objective:      Physical Exam  Constitutional:       General: She is not in acute distress.     Appearance: She is well-developed. She is not diaphoretic.      Comments: Not toxic appearing.  Very weak and difficult even to stand to have wt checked   HENT:      Head: Normocephalic and atraumatic.      Mouth/Throat:      Pharynx: No oropharyngeal exudate.   Eyes:      General: No scleral icterus.     Pupils: Pupils are equal, round, and reactive to light.   Cardiovascular:      Rate and  Rhythm: Normal rate.      Heart sounds: No murmur.      Comments: Irregularly irregular    Pulmonary:      Effort: Pulmonary effort is normal.      Breath sounds: Normal breath sounds.   Abdominal:      General: Bowel sounds are normal. There is no distension.      Palpations: Abdomen is soft. There is no mass.      Tenderness: There is no abdominal tenderness. There is no guarding or rebound.      Hernia: No hernia is present.   Musculoskeletal:      Comments: bilat swollen legs (2+ with pitting and weeping), a bit btr than last time, with chronic venous stasis.  Large open sore posterior L calf has mostly healed, still small area is open and with serour drainage.  No purulence or redness    Feet with onychogryphosis in nails   Skin:     General: Skin is warm and dry.   Neurological:      Mental Status: She is alert and oriented to person, place, and time.      Comments: Unable to weight bear alone, bilat prox muscles 4/5 strength to resistant testing.       Psychiatric:         Behavior: Behavior normal.         Assessment:       1. Chronic atrial fibrillation    2. Systolic CHF, chronic    3. 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 Morbid (severe) obesity due to excess calories    5. Essential hypertension    6. CKD (chronic kidney disease) stage 3, GFR 30-59 ml/min    7. Other specified anemias    8. Abnormal finding of blood chemistry, unspecified         Plan:       Celina was seen today for follow-up.    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  -     WHEELCHAIR FOR HOME USE  Morbid (severe) obesity due to excess calories  -     WHEELCHAIR FOR HOME USE    Essential hypertension  Controlled on current meds    CKD (chronic kidney disease) stage 3, GFR 30-59 ml/min  -      WHEELCHAIR FOR HOME USE  -     CBC auto differential; Future  -     Comprehensive metabolic panel; Future    Other specified anemias  -     Iron and TIBC; Future  -     Ferritin; Future    Abnormal finding of blood chemistry, unspecified   -     Iron and TIBC; Future  -     Ferritin; Future        Health Maintenance       Date Due Completion Date    Shingles Vaccine (2 of 2) 11/25/2020 9/30/2020    Lipid Panel 05/04/2025 5/4/2020    TETANUS VACCINE 02/27/2029 2/27/2019          Follow up in about 6 months (around 3/30/2021) for Flu vax please and Shingles vaccine please.    Future Appointments   Date Time Provider Department Center   3/29/2021  3:20 PM Chago Jones MD [facility]

## 2020-10-14 ENCOUNTER — TELEPHONE (OUTPATIENT)
Dept: INTERNAL MEDICINE | Facility: CLINIC | Age: 85
End: 2020-10-14

## 2020-10-14 DIAGNOSIS — D50.8 OTHER IRON DEFICIENCY ANEMIA: Primary | ICD-10-CM

## 2020-10-14 RX ORDER — FERROUS SULFATE 325(65) MG
325 TABLET ORAL
Qty: 90 TABLET | Refills: 11 | Status: ON HOLD | OUTPATIENT
Start: 2020-10-14 | End: 2022-01-01 | Stop reason: HOSPADM

## 2020-10-14 NOTE — TELEPHONE ENCOUNTER
Hi, please call her daughter -- the blood work from 2 weeks ago shows a low iron level and anemia or low red blood count.  I recommend that she take   Orders Placed This Encounter    ferrous sulfate (FEOSOL) 325 mg (65 mg iron) Tab tablet     It is over the counter but I also sent it in as a prescription in case it is covered.    The kidney function was stable and the electrolytes and liver function were all normal.    Let me know if patient has any questions.  Thank you, Chago Jones

## 2020-10-19 NOTE — TELEPHONE ENCOUNTER
Informed pt's daughter of lab results. Pt verbalized understanding. She states the the iron supplement has been covered through the pt's insurance.

## 2021-01-01 ENCOUNTER — OFFICE VISIT (OUTPATIENT)
Dept: INTERNAL MEDICINE | Facility: CLINIC | Age: 86
End: 2021-01-01
Payer: MEDICARE

## 2021-01-01 ENCOUNTER — LAB VISIT (OUTPATIENT)
Dept: LAB | Facility: HOSPITAL | Age: 86
End: 2021-01-01
Attending: INTERNAL MEDICINE
Payer: MEDICARE

## 2021-01-01 ENCOUNTER — TELEPHONE (OUTPATIENT)
Dept: INTERNAL MEDICINE | Facility: CLINIC | Age: 86
End: 2021-01-01
Payer: MEDICARE

## 2021-01-01 ENCOUNTER — TELEPHONE (OUTPATIENT)
Dept: INTERNAL MEDICINE | Facility: CLINIC | Age: 86
End: 2021-01-01

## 2021-01-01 VITALS
HEIGHT: 65 IN | SYSTOLIC BLOOD PRESSURE: 110 MMHG | BODY MASS INDEX: 36.61 KG/M2 | DIASTOLIC BLOOD PRESSURE: 72 MMHG | HEART RATE: 53 BPM | OXYGEN SATURATION: 98 %

## 2021-01-01 DIAGNOSIS — D50.8 OTHER IRON DEFICIENCY ANEMIA: Primary | ICD-10-CM

## 2021-01-01 DIAGNOSIS — I48.20 CHRONIC ATRIAL FIBRILLATION: ICD-10-CM

## 2021-01-01 DIAGNOSIS — D50.8 OTHER IRON DEFICIENCY ANEMIA: ICD-10-CM

## 2021-01-01 DIAGNOSIS — I50.22 SYSTOLIC CHF, CHRONIC: ICD-10-CM

## 2021-01-01 DIAGNOSIS — M17.0 PRIMARY OSTEOARTHRITIS OF BOTH KNEES: ICD-10-CM

## 2021-01-01 DIAGNOSIS — E66.01 MORBID (SEVERE) OBESITY DUE TO EXCESS CALORIES: Primary | ICD-10-CM

## 2021-01-01 DIAGNOSIS — I50.22 SYSTOLIC CHF, CHRONIC: Chronic | ICD-10-CM

## 2021-01-01 DIAGNOSIS — Z74.09 IMMOBILITY: ICD-10-CM

## 2021-01-01 DIAGNOSIS — E66.01 MORBID (SEVERE) OBESITY DUE TO EXCESS CALORIES: ICD-10-CM

## 2021-01-01 DIAGNOSIS — N18.30 STAGE 3 CHRONIC KIDNEY DISEASE, UNSPECIFIED WHETHER STAGE 3A OR 3B CKD: ICD-10-CM

## 2021-01-01 LAB
ALBUMIN SERPL BCP-MCNC: 3.8 G/DL (ref 3.5–5.2)
ALP SERPL-CCNC: 52 U/L (ref 55–135)
ALT SERPL W/O P-5'-P-CCNC: 8 U/L (ref 10–44)
ANION GAP SERPL CALC-SCNC: 10 MMOL/L (ref 8–16)
AST SERPL-CCNC: 27 U/L (ref 10–40)
BASOPHILS # BLD AUTO: 0.06 K/UL (ref 0–0.2)
BASOPHILS NFR BLD: 1.6 % (ref 0–1.9)
BILIRUB SERPL-MCNC: 1.2 MG/DL (ref 0.1–1)
BUN SERPL-MCNC: 22 MG/DL (ref 8–23)
CALCIUM SERPL-MCNC: 10.4 MG/DL (ref 8.7–10.5)
CHLORIDE SERPL-SCNC: 102 MMOL/L (ref 95–110)
CHOLEST SERPL-MCNC: 144 MG/DL (ref 120–199)
CHOLEST/HDLC SERPL: 3.4 {RATIO} (ref 2–5)
CO2 SERPL-SCNC: 27 MMOL/L (ref 23–29)
CREAT SERPL-MCNC: 0.9 MG/DL (ref 0.5–1.4)
DIFFERENTIAL METHOD: ABNORMAL
EOSINOPHIL # BLD AUTO: 0.3 K/UL (ref 0–0.5)
EOSINOPHIL NFR BLD: 7.6 % (ref 0–8)
ERYTHROCYTE [DISTWIDTH] IN BLOOD BY AUTOMATED COUNT: 15.4 % (ref 11.5–14.5)
EST. GFR  (AFRICAN AMERICAN): >60 ML/MIN/1.73 M^2
EST. GFR  (NON AFRICAN AMERICAN): 57.3 ML/MIN/1.73 M^2
GLUCOSE SERPL-MCNC: 75 MG/DL (ref 70–110)
HCT VFR BLD AUTO: 34.6 % (ref 37–48.5)
HDLC SERPL-MCNC: 42 MG/DL (ref 40–75)
HDLC SERPL: 29.2 % (ref 20–50)
HGB BLD-MCNC: 11 G/DL (ref 12–16)
IMM GRANULOCYTES # BLD AUTO: 0 K/UL (ref 0–0.04)
IMM GRANULOCYTES NFR BLD AUTO: 0 % (ref 0–0.5)
IRON SERPL-MCNC: 27 UG/DL (ref 30–160)
LDLC SERPL CALC-MCNC: 85.6 MG/DL (ref 63–159)
LYMPHOCYTES # BLD AUTO: 0.8 K/UL (ref 1–4.8)
LYMPHOCYTES NFR BLD: 21.5 % (ref 18–48)
MCH RBC QN AUTO: 30 PG (ref 27–31)
MCHC RBC AUTO-ENTMCNC: 31.8 G/DL (ref 32–36)
MCV RBC AUTO: 94 FL (ref 82–98)
MONOCYTES # BLD AUTO: 0.4 K/UL (ref 0.3–1)
MONOCYTES NFR BLD: 9.4 % (ref 4–15)
NEUTROPHILS # BLD AUTO: 2.3 K/UL (ref 1.8–7.7)
NEUTROPHILS NFR BLD: 59.9 % (ref 38–73)
NONHDLC SERPL-MCNC: 102 MG/DL
NRBC BLD-RTO: 0 /100 WBC
PLATELET # BLD AUTO: 172 K/UL (ref 150–450)
PMV BLD AUTO: 11.3 FL (ref 9.2–12.9)
POTASSIUM SERPL-SCNC: 4.1 MMOL/L (ref 3.5–5.1)
PROT SERPL-MCNC: 7.3 G/DL (ref 6–8.4)
RBC # BLD AUTO: 3.67 M/UL (ref 4–5.4)
SATURATED IRON: 8 % (ref 20–50)
SODIUM SERPL-SCNC: 139 MMOL/L (ref 136–145)
TOTAL IRON BINDING CAPACITY: 346 UG/DL (ref 250–450)
TRANSFERRIN SERPL-MCNC: 234 MG/DL (ref 200–375)
TRIGL SERPL-MCNC: 82 MG/DL (ref 30–150)
WBC # BLD AUTO: 3.81 K/UL (ref 3.9–12.7)

## 2021-01-01 PROCEDURE — 99499 RISK ADDL DX/OHS AUDIT: ICD-10-PCS | Mod: S$GLB,,, | Performed by: INTERNAL MEDICINE

## 2021-01-01 PROCEDURE — 1160F RVW MEDS BY RX/DR IN RCRD: CPT | Mod: CPTII,95,, | Performed by: INTERNAL MEDICINE

## 2021-01-01 PROCEDURE — 99214 OFFICE O/P EST MOD 30 MIN: CPT | Mod: S$GLB,,, | Performed by: INTERNAL MEDICINE

## 2021-01-01 PROCEDURE — 99443 PR PHYSICIAN TELEPHONE EVALUATION 21-30 MIN: CPT | Mod: 95,,, | Performed by: INTERNAL MEDICINE

## 2021-01-01 PROCEDURE — 1125F PR PAIN SEVERITY QUANTIFIED, PAIN PRESENT: ICD-10-PCS | Mod: S$GLB,,, | Performed by: INTERNAL MEDICINE

## 2021-01-01 PROCEDURE — 99999 PR PBB SHADOW E&M-EST. PATIENT-LVL III: CPT | Mod: PBBFAC,,, | Performed by: INTERNAL MEDICINE

## 2021-01-01 PROCEDURE — 80061 LIPID PANEL: CPT | Performed by: INTERNAL MEDICINE

## 2021-01-01 PROCEDURE — 36415 COLL VENOUS BLD VENIPUNCTURE: CPT | Performed by: INTERNAL MEDICINE

## 2021-01-01 PROCEDURE — 99999 PR PBB SHADOW E&M-EST. PATIENT-LVL III: ICD-10-PCS | Mod: PBBFAC,,, | Performed by: INTERNAL MEDICINE

## 2021-01-01 PROCEDURE — 3288F FALL RISK ASSESSMENT DOCD: CPT | Mod: CPTII,S$GLB,, | Performed by: INTERNAL MEDICINE

## 2021-01-01 PROCEDURE — 1159F PR MEDICATION LIST DOCUMENTED IN MEDICAL RECORD: ICD-10-PCS | Mod: CPTII,95,, | Performed by: INTERNAL MEDICINE

## 2021-01-01 PROCEDURE — 1125F AMNT PAIN NOTED PAIN PRSNT: CPT | Mod: S$GLB,,, | Performed by: INTERNAL MEDICINE

## 2021-01-01 PROCEDURE — 99499 UNLISTED E&M SERVICE: CPT | Mod: S$GLB,,, | Performed by: INTERNAL MEDICINE

## 2021-01-01 PROCEDURE — 85025 COMPLETE CBC W/AUTO DIFF WBC: CPT | Performed by: INTERNAL MEDICINE

## 2021-01-01 PROCEDURE — 99214 PR OFFICE/OUTPT VISIT, EST, LEVL IV, 30-39 MIN: ICD-10-PCS | Mod: S$GLB,,, | Performed by: INTERNAL MEDICINE

## 2021-01-01 PROCEDURE — 83540 ASSAY OF IRON: CPT | Performed by: INTERNAL MEDICINE

## 2021-01-01 PROCEDURE — 1159F PR MEDICATION LIST DOCUMENTED IN MEDICAL RECORD: ICD-10-PCS | Mod: S$GLB,,, | Performed by: INTERNAL MEDICINE

## 2021-01-01 PROCEDURE — 80053 COMPREHEN METABOLIC PANEL: CPT | Performed by: INTERNAL MEDICINE

## 2021-01-01 PROCEDURE — 1101F PT FALLS ASSESS-DOCD LE1/YR: CPT | Mod: CPTII,S$GLB,, | Performed by: INTERNAL MEDICINE

## 2021-01-01 PROCEDURE — 1159F MED LIST DOCD IN RCRD: CPT | Mod: CPTII,95,, | Performed by: INTERNAL MEDICINE

## 2021-01-01 PROCEDURE — 1159F MED LIST DOCD IN RCRD: CPT | Mod: S$GLB,,, | Performed by: INTERNAL MEDICINE

## 2021-01-01 PROCEDURE — 99443 PR PHYSICIAN TELEPHONE EVALUATION 21-30 MIN: ICD-10-PCS | Mod: 95,,, | Performed by: INTERNAL MEDICINE

## 2021-01-01 PROCEDURE — 3288F PR FALLS RISK ASSESSMENT DOCUMENTED: ICD-10-PCS | Mod: CPTII,S$GLB,, | Performed by: INTERNAL MEDICINE

## 2021-01-01 PROCEDURE — 1160F PR REVIEW ALL MEDS BY PRESCRIBER/CLIN PHARMACIST DOCUMENTED: ICD-10-PCS | Mod: CPTII,95,, | Performed by: INTERNAL MEDICINE

## 2021-01-01 PROCEDURE — 1101F PR PT FALLS ASSESS DOC 0-1 FALLS W/OUT INJ PAST YR: ICD-10-PCS | Mod: CPTII,S$GLB,, | Performed by: INTERNAL MEDICINE

## 2021-01-01 RX ORDER — FLUTICASONE PROPIONATE 50 MCG
2 SPRAY, SUSPENSION (ML) NASAL DAILY
Qty: 16 G | Refills: 11 | Status: ON HOLD | OUTPATIENT
Start: 2021-01-01 | End: 2022-01-01 | Stop reason: HOSPADM

## 2021-01-01 RX ORDER — DICLOFENAC SODIUM 10 MG/G
2 GEL TOPICAL DAILY
Qty: 100 G | Refills: 1 | Status: SHIPPED | OUTPATIENT
Start: 2021-01-01 | End: 2021-01-01

## 2021-01-01 RX ORDER — METOPROLOL SUCCINATE 50 MG/1
150 TABLET, EXTENDED RELEASE ORAL DAILY
Qty: 270 TABLET | Refills: 11 | Status: ON HOLD | OUTPATIENT
Start: 2021-01-01 | End: 2022-01-01 | Stop reason: HOSPADM

## 2021-03-04 NOTE — TELEPHONE ENCOUNTER
Reason for Disposition   Message left on identified answering machine.    Protocols used: ST NO CONTACT OR DUPLICATE CONTACT CALL-A-AH  call returned--no answer/ left VM--# verified in EPIC also    Leslie Miller RN   Consent (Temporal Branch)/Introductory Paragraph: The rationale for Mohs was explained to the patient and consent was obtained. The risks, benefits and alternatives to therapy were discussed in detail. Specifically, the risks of damage to the temporal branch of the facial nerve, infection, scarring, bleeding, prolonged wound healing, incomplete removal, allergy to anesthesia, and recurrence were addressed. Prior to the procedure, the treatment site was clearly identified and confirmed by the patient. All components of Universal Protocol/PAUSE Rule completed.

## 2021-06-28 PROBLEM — N18.30 STAGE 3 CHRONIC KIDNEY DISEASE: Status: ACTIVE | Noted: 2021-01-01

## 2022-01-01 ENCOUNTER — TELEPHONE (OUTPATIENT)
Dept: INTERNAL MEDICINE | Facility: CLINIC | Age: 87
End: 2022-01-01
Payer: MEDICARE

## 2022-01-01 ENCOUNTER — HOSPITAL ENCOUNTER (INPATIENT)
Facility: HOSPITAL | Age: 87
LOS: 1 days | DRG: 871 | End: 2022-01-21
Attending: EMERGENCY MEDICINE | Admitting: INTERNAL MEDICINE
Payer: MEDICARE

## 2022-01-01 ENCOUNTER — PES CALL (OUTPATIENT)
Dept: HOME HEALTH SERVICES | Facility: CLINIC | Age: 87
End: 2022-01-01
Payer: MEDICARE

## 2022-01-01 VITALS
BODY MASS INDEX: 24.84 KG/M2 | TEMPERATURE: 100 F | HEART RATE: 35 BPM | RESPIRATION RATE: 16 BRPM | OXYGEN SATURATION: 92 % | WEIGHT: 145.5 LBS | SYSTOLIC BLOOD PRESSURE: 130 MMHG | DIASTOLIC BLOOD PRESSURE: 58 MMHG | HEIGHT: 64 IN

## 2022-01-01 DIAGNOSIS — J90 PLEURAL EFFUSION: Primary | ICD-10-CM

## 2022-01-01 DIAGNOSIS — N17.9 ACUTE RENAL FAILURE, UNSPECIFIED ACUTE RENAL FAILURE TYPE: ICD-10-CM

## 2022-01-01 DIAGNOSIS — J90 LARGE PLEURAL EFFUSION: ICD-10-CM

## 2022-01-01 DIAGNOSIS — I50.43 ACUTE ON CHRONIC COMBINED SYSTOLIC AND DIASTOLIC CHF (CONGESTIVE HEART FAILURE): ICD-10-CM

## 2022-01-01 DIAGNOSIS — I50.22 SYSTOLIC CHF, CHRONIC: Primary | ICD-10-CM

## 2022-01-01 DIAGNOSIS — I48.91 A-FIB: ICD-10-CM

## 2022-01-01 DIAGNOSIS — E66.01 MORBID (SEVERE) OBESITY DUE TO EXCESS CALORIES: ICD-10-CM

## 2022-01-01 DIAGNOSIS — R53.1 WEAKNESS: ICD-10-CM

## 2022-01-01 DIAGNOSIS — I48.20 CHRONIC ATRIAL FIBRILLATION: ICD-10-CM

## 2022-01-01 DIAGNOSIS — R07.9 CHEST PAIN: ICD-10-CM

## 2022-01-01 DIAGNOSIS — Z71.89 ACP (ADVANCE CARE PLANNING): ICD-10-CM

## 2022-01-01 DIAGNOSIS — R57.9 SHOCK, UNSPECIFIED: ICD-10-CM

## 2022-01-01 DIAGNOSIS — I48.91 ATRIAL FIBRILLATION: ICD-10-CM

## 2022-01-01 DIAGNOSIS — I95.9 HYPOTENSION: ICD-10-CM

## 2022-01-01 LAB
ALBUMIN SERPL BCP-MCNC: 2.2 G/DL (ref 3.5–5.2)
ALBUMIN SERPL BCP-MCNC: 2.2 G/DL (ref 3.5–5.2)
ALBUMIN SERPL BCP-MCNC: 2.6 G/DL (ref 3.5–5.2)
ALBUMIN SERPL BCP-MCNC: 3.1 G/DL (ref 3.5–5.2)
ALLENS TEST: ABNORMAL
ALP SERPL-CCNC: 58 U/L (ref 55–135)
ALP SERPL-CCNC: 63 U/L (ref 55–135)
ALP SERPL-CCNC: 63 U/L (ref 55–135)
ALT SERPL W/O P-5'-P-CCNC: 16 U/L (ref 10–44)
ALT SERPL W/O P-5'-P-CCNC: 18 U/L (ref 10–44)
ALT SERPL W/O P-5'-P-CCNC: 20 U/L (ref 10–44)
ANION GAP SERPL CALC-SCNC: 11 MMOL/L (ref 8–16)
ANION GAP SERPL CALC-SCNC: 11 MMOL/L (ref 8–16)
ANION GAP SERPL CALC-SCNC: 13 MMOL/L (ref 8–16)
ANION GAP SERPL CALC-SCNC: 14 MMOL/L (ref 8–16)
ANION GAP SERPL CALC-SCNC: 18 MMOL/L (ref 8–16)
ANISOCYTOSIS BLD QL SMEAR: SLIGHT
APPEARANCE FLD: NORMAL
ASCENDING AORTA: 4.85 CM
AST SERPL-CCNC: 62 U/L (ref 10–40)
AST SERPL-CCNC: 66 U/L (ref 10–40)
AST SERPL-CCNC: 67 U/L (ref 10–40)
AV INDEX (PROSTH): 0.61
AV MEAN GRADIENT: 4 MMHG
AV PEAK GRADIENT: 12 MMHG
AV VALVE AREA: 2.01 CM2
AV VELOCITY RATIO: 0.42
BACTERIA #/AREA URNS AUTO: ABNORMAL /HPF
BASOPHILS # BLD AUTO: 0 K/UL (ref 0–0.2)
BASOPHILS # BLD AUTO: 0.01 K/UL (ref 0–0.2)
BASOPHILS NFR BLD: 0 % (ref 0–1.9)
BASOPHILS NFR BLD: 0.2 % (ref 0–1.9)
BILIRUB DIRECT SERPL-MCNC: 1.2 MG/DL (ref 0.1–0.3)
BILIRUB SERPL-MCNC: 1.2 MG/DL (ref 0.1–1)
BILIRUB SERPL-MCNC: 1.5 MG/DL (ref 0.1–1)
BILIRUB SERPL-MCNC: 1.8 MG/DL (ref 0.1–1)
BILIRUB UR QL STRIP: ABNORMAL
BILIRUB UR QL STRIP: NEGATIVE
BNP SERPL-MCNC: 920 PG/ML (ref 0–99)
BODY FLD TYPE: NORMAL
BODY FLUID SOURCE, LDH: NORMAL
BSA FOR ECHO PROCEDURE: 1.73 M2
BUN SERPL-MCNC: 54 MG/DL (ref 8–23)
BUN SERPL-MCNC: 64 MG/DL (ref 8–23)
BUN SERPL-MCNC: 66 MG/DL (ref 8–23)
BUN SERPL-MCNC: 66 MG/DL (ref 8–23)
BUN SERPL-MCNC: 70 MG/DL (ref 8–23)
CALCIUM SERPL-MCNC: 10.5 MG/DL (ref 8.7–10.5)
CALCIUM SERPL-MCNC: 8.6 MG/DL (ref 8.7–10.5)
CALCIUM SERPL-MCNC: 8.6 MG/DL (ref 8.7–10.5)
CALCIUM SERPL-MCNC: 8.8 MG/DL (ref 8.7–10.5)
CALCIUM SERPL-MCNC: 9.3 MG/DL (ref 8.7–10.5)
CHLORIDE SERPL-SCNC: 102 MMOL/L (ref 95–110)
CHLORIDE SERPL-SCNC: 102 MMOL/L (ref 95–110)
CHLORIDE SERPL-SCNC: 104 MMOL/L (ref 95–110)
CHLORIDE SERPL-SCNC: 105 MMOL/L (ref 95–110)
CHLORIDE SERPL-SCNC: 106 MMOL/L (ref 95–110)
CLARITY UR REFRACT.AUTO: ABNORMAL
CLARITY UR REFRACT.AUTO: ABNORMAL
CO2 SERPL-SCNC: 19 MMOL/L (ref 23–29)
CO2 SERPL-SCNC: 21 MMOL/L (ref 23–29)
CO2 SERPL-SCNC: 21 MMOL/L (ref 23–29)
CO2 SERPL-SCNC: 22 MMOL/L (ref 23–29)
CO2 SERPL-SCNC: 24 MMOL/L (ref 23–29)
COLOR FLD: YELLOW
COLOR UR AUTO: ABNORMAL
COLOR UR AUTO: ABNORMAL
CREAT SERPL-MCNC: 2.4 MG/DL (ref 0.5–1.4)
CREAT SERPL-MCNC: 2.5 MG/DL (ref 0.5–1.4)
CREAT SERPL-MCNC: 2.5 MG/DL (ref 0.5–1.4)
CREAT SERPL-MCNC: 2.6 MG/DL (ref 0.5–1.4)
CREAT SERPL-MCNC: 2.7 MG/DL (ref 0.5–1.4)
CTP QC/QA: YES
CV ECHO LV RWT: 1.02 CM
DELSYS: ABNORMAL
DIFFERENTIAL METHOD: ABNORMAL
DIFFERENTIAL METHOD: ABNORMAL
DOP CALC AO PEAK VEL: 1.73 M/S
DOP CALC AO VTI: 20.57 CM
DOP CALC LVOT AREA: 3.3 CM2
DOP CALC LVOT DIAMETER: 2.04 CM
DOP CALC LVOT PEAK VEL: 0.72 M/S
DOP CALC LVOT STROKE VOLUME: 41.33 CM3
DOP CALCLVOT PEAK VEL VTI: 12.65 CM
E/E' RATIO: 12.22 M/S
ECHO LV POSTERIOR WALL: 1.84 CM (ref 0.6–1.1)
EJECTION FRACTION: 53 %
EOSINOPHIL # BLD AUTO: 0 K/UL (ref 0–0.5)
EOSINOPHIL # BLD AUTO: 0 K/UL (ref 0–0.5)
EOSINOPHIL NFR BLD: 0 % (ref 0–8)
EOSINOPHIL NFR BLD: 0 % (ref 0–8)
EOSINOPHIL NFR FLD MANUAL: 4 %
EP: 5
ERYTHROCYTE [DISTWIDTH] IN BLOOD BY AUTOMATED COUNT: 14.7 % (ref 11.5–14.5)
ERYTHROCYTE [DISTWIDTH] IN BLOOD BY AUTOMATED COUNT: 14.8 % (ref 11.5–14.5)
ERYTHROCYTE [SEDIMENTATION RATE] IN BLOOD BY WESTERGREN METHOD: 12 MM/H
EST. GFR  (AFRICAN AMERICAN): 17.5 ML/MIN/1.73 M^2
EST. GFR  (AFRICAN AMERICAN): 18.3 ML/MIN/1.73 M^2
EST. GFR  (AFRICAN AMERICAN): 19.2 ML/MIN/1.73 M^2
EST. GFR  (AFRICAN AMERICAN): 19.2 ML/MIN/1.73 M^2
EST. GFR  (AFRICAN AMERICAN): 20.2 ML/MIN/1.73 M^2
EST. GFR  (NON AFRICAN AMERICAN): 15.2 ML/MIN/1.73 M^2
EST. GFR  (NON AFRICAN AMERICAN): 15.9 ML/MIN/1.73 M^2
EST. GFR  (NON AFRICAN AMERICAN): 16.7 ML/MIN/1.73 M^2
EST. GFR  (NON AFRICAN AMERICAN): 16.7 ML/MIN/1.73 M^2
EST. GFR  (NON AFRICAN AMERICAN): 17.5 ML/MIN/1.73 M^2
FIO2: 100
FIO2: 50
FRACTIONAL SHORTENING: 28 % (ref 28–44)
GLUCOSE FLD-MCNC: 106 MG/DL
GLUCOSE SERPL-MCNC: 119 MG/DL (ref 70–110)
GLUCOSE SERPL-MCNC: 148 MG/DL (ref 70–110)
GLUCOSE SERPL-MCNC: 190 MG/DL (ref 70–110)
GLUCOSE SERPL-MCNC: 219 MG/DL (ref 70–110)
GLUCOSE SERPL-MCNC: 221 MG/DL (ref 70–110)
GLUCOSE UR QL STRIP: ABNORMAL
GLUCOSE UR QL STRIP: NEGATIVE
HCO3 UR-SCNC: 17.2 MMOL/L (ref 24–28)
HCO3 UR-SCNC: 20.5 MMOL/L (ref 24–28)
HCO3 UR-SCNC: 20.6 MMOL/L (ref 24–28)
HCO3 UR-SCNC: 21.6 MMOL/L (ref 24–28)
HCO3 UR-SCNC: 22.2 MMOL/L (ref 24–28)
HCO3 UR-SCNC: 22.5 MMOL/L (ref 24–28)
HCO3 UR-SCNC: 22.6 MMOL/L (ref 24–28)
HCO3 UR-SCNC: 25.5 MMOL/L (ref 24–28)
HCO3 UR-SCNC: 25.5 MMOL/L (ref 24–28)
HCT VFR BLD AUTO: 38.7 % (ref 37–48.5)
HCT VFR BLD AUTO: 41.2 % (ref 37–48.5)
HCT VFR BLD CALC: 38 %PCV (ref 36–54)
HGB BLD-MCNC: 12 G/DL (ref 12–16)
HGB BLD-MCNC: 12.2 G/DL (ref 12–16)
HGB UR QL STRIP: ABNORMAL
HGB UR QL STRIP: NEGATIVE
HYALINE CASTS UR QL AUTO: 0 /LPF
HYPOCHROMIA BLD QL SMEAR: ABNORMAL
IMM GRANULOCYTES # BLD AUTO: 0.01 K/UL (ref 0–0.04)
IMM GRANULOCYTES # BLD AUTO: 0.01 K/UL (ref 0–0.04)
IMM GRANULOCYTES NFR BLD AUTO: 0.2 % (ref 0–0.5)
IMM GRANULOCYTES NFR BLD AUTO: 0.3 % (ref 0–0.5)
INR PPP: 1.5 (ref 0.8–1.2)
INTERVENTRICULAR SEPTUM: 2.03 CM (ref 0.6–1.1)
IP: 15
IP: 18
IP: 20
KETONES UR QL STRIP: NEGATIVE
KETONES UR QL STRIP: NEGATIVE
LA MAJOR: 6.81 CM
LA MINOR: 6.96 CM
LA WIDTH: 7.51 CM
LACTATE SERPL-SCNC: 2.5 MMOL/L (ref 0.5–2.2)
LACTATE SERPL-SCNC: 3.3 MMOL/L (ref 0.5–2.2)
LACTATE SERPL-SCNC: 5.3 MMOL/L (ref 0.5–2.2)
LDH FLD L TO P-CCNC: 454 U/L
LDH SERPL L TO P-CCNC: 384 U/L (ref 110–260)
LEFT ATRIUM SIZE: 4.96 CM
LEFT ATRIUM VOLUME INDEX MOD: 144.5 ML/M2
LEFT ATRIUM VOLUME INDEX: 127.5 ML/M2
LEFT ATRIUM VOLUME MOD: 247.06 CM3
LEFT ATRIUM VOLUME: 217.97 CM3
LEFT INTERNAL DIMENSION IN SYSTOLE: 2.6 CM (ref 2.1–4)
LEFT VENTRICLE DIASTOLIC VOLUME INDEX: 17.43 ML/M2
LEFT VENTRICLE DIASTOLIC VOLUME: 29.81 ML
LEFT VENTRICLE MASS INDEX: 180 G/M2
LEFT VENTRICLE SYSTOLIC VOLUME INDEX: 11.8 ML/M2
LEFT VENTRICLE SYSTOLIC VOLUME: 20.22 ML
LEFT VENTRICULAR INTERNAL DIMENSION IN DIASTOLE: 3.6 CM (ref 3.5–6)
LEFT VENTRICULAR MASS: 308.59 G
LEUKOCYTE ESTERASE UR QL STRIP: ABNORMAL
LEUKOCYTE ESTERASE UR QL STRIP: NEGATIVE
LIPASE SERPL-CCNC: 6 U/L (ref 4–60)
LV LATERAL E/E' RATIO: 11 M/S
LV SEPTAL E/E' RATIO: 13.75 M/S
LYMPHOCYTES # BLD AUTO: 0.1 K/UL (ref 1–4.8)
LYMPHOCYTES # BLD AUTO: 0.3 K/UL (ref 1–4.8)
LYMPHOCYTES NFR BLD: 3.8 % (ref 18–48)
LYMPHOCYTES NFR BLD: 5.6 % (ref 18–48)
LYMPHOCYTES NFR FLD MANUAL: 22 %
MAGNESIUM SERPL-MCNC: 2.1 MG/DL (ref 1.6–2.6)
MCH RBC QN AUTO: 29.1 PG (ref 27–31)
MCH RBC QN AUTO: 29.5 PG (ref 27–31)
MCHC RBC AUTO-ENTMCNC: 29.6 G/DL (ref 32–36)
MCHC RBC AUTO-ENTMCNC: 31 G/DL (ref 32–36)
MCV RBC AUTO: 95 FL (ref 82–98)
MCV RBC AUTO: 98 FL (ref 82–98)
MICROSCOPIC COMMENT: ABNORMAL
MIN VOL: 12.2
MODE: ABNORMAL
MONOCYTES # BLD AUTO: 0.1 K/UL (ref 0.3–1)
MONOCYTES # BLD AUTO: 0.5 K/UL (ref 0.3–1)
MONOCYTES NFR BLD: 10.9 % (ref 4–15)
MONOCYTES NFR BLD: 3.4 % (ref 4–15)
MONOS+MACROS NFR FLD MANUAL: 68 %
MV PEAK E VEL: 0.55 M/S
NEUTROPHILS # BLD AUTO: 2.7 K/UL (ref 1.8–7.7)
NEUTROPHILS # BLD AUTO: 4 K/UL (ref 1.8–7.7)
NEUTROPHILS NFR BLD: 83.1 % (ref 38–73)
NEUTROPHILS NFR BLD: 92.5 % (ref 38–73)
NEUTROPHILS NFR FLD MANUAL: 6 %
NITRITE UR QL STRIP: NEGATIVE
NITRITE UR QL STRIP: NEGATIVE
NRBC BLD-RTO: 0 /100 WBC
NRBC BLD-RTO: 0 /100 WBC
OVALOCYTES BLD QL SMEAR: ABNORMAL
PCO2 BLDA: 37.7 MMHG (ref 35–45)
PCO2 BLDA: 45.3 MMHG (ref 35–45)
PCO2 BLDA: 51.7 MMHG (ref 35–45)
PCO2 BLDA: 52.4 MMHG (ref 35–45)
PCO2 BLDA: 53.2 MMHG (ref 35–45)
PCO2 BLDA: 53.2 MMHG (ref 35–45)
PCO2 BLDA: 56.9 MMHG (ref 35–45)
PCO2 BLDA: 59 MMHG (ref 35–45)
PCO2 BLDA: 69.1 MMHG (ref 35–45)
PH SMN: 7.08 [PH] (ref 7.35–7.45)
PH SMN: 7.17 [PH] (ref 7.35–7.45)
PH SMN: 7.2 [PH] (ref 7.35–7.45)
PH SMN: 7.24 [PH] (ref 7.35–7.45)
PH SMN: 7.25 [PH] (ref 7.35–7.45)
PH SMN: 7.27 [PH] (ref 7.35–7.45)
PH SMN: 7.27 [PH] (ref 7.35–7.45)
PH SMN: 7.29 [PH] (ref 7.35–7.45)
PH SMN: 7.29 [PH] (ref 7.35–7.45)
PH UR STRIP: 5 [PH] (ref 5–8)
PH UR STRIP: 5 [PH] (ref 5–8)
PHOSPHATE SERPL-MCNC: 5.6 MG/DL (ref 2.7–4.5)
PISA TR MAX VEL: 3.42 M/S
PLATELET # BLD AUTO: 151 K/UL (ref 150–450)
PLATELET # BLD AUTO: 160 K/UL (ref 150–450)
PLATELET BLD QL SMEAR: ABNORMAL
PMV BLD AUTO: 10.5 FL (ref 9.2–12.9)
PMV BLD AUTO: 10.7 FL (ref 9.2–12.9)
PO2 BLDA: 101 MMHG (ref 80–100)
PO2 BLDA: 101 MMHG (ref 80–100)
PO2 BLDA: 110 MMHG (ref 80–100)
PO2 BLDA: 112 MMHG (ref 80–100)
PO2 BLDA: 112 MMHG (ref 80–100)
PO2 BLDA: 114 MMHG (ref 80–100)
PO2 BLDA: 122 MMHG (ref 80–100)
PO2 BLDA: 309 MMHG (ref 80–100)
PO2 BLDA: 91 MMHG (ref 80–100)
POC BE: -1 MMOL/L
POC BE: -1 MMOL/L
POC BE: -10 MMOL/L
POC BE: -5 MMOL/L
POC BE: -5 MMOL/L
POC BE: -6 MMOL/L
POC BE: -6 MMOL/L
POC BE: -7 MMOL/L
POC BE: -9 MMOL/L
POC IONIZED CALCIUM: 1.23 MMOL/L (ref 1.06–1.42)
POC SATURATED O2: 100 % (ref 95–100)
POC SATURATED O2: 92 % (ref 95–100)
POC SATURATED O2: 97 % (ref 95–100)
POC SATURATED O2: 98 % (ref 95–100)
POC TCO2: 18 MMOL/L (ref 23–27)
POC TCO2: 22 MMOL/L (ref 23–27)
POC TCO2: 23 MMOL/L (ref 23–27)
POC TCO2: 23 MMOL/L (ref 23–27)
POC TCO2: 24 MMOL/L (ref 23–27)
POC TCO2: 27 MMOL/L (ref 23–27)
POC TCO2: 27 MMOL/L (ref 23–27)
POCT GLUCOSE: 114 MG/DL (ref 70–110)
POIKILOCYTOSIS BLD QL SMEAR: SLIGHT
POLYCHROMASIA BLD QL SMEAR: ABNORMAL
POTASSIUM BLD-SCNC: 4.7 MMOL/L (ref 3.5–5.1)
POTASSIUM SERPL-SCNC: 5 MMOL/L (ref 3.5–5.1)
POTASSIUM SERPL-SCNC: 5.8 MMOL/L (ref 3.5–5.1)
PROT FLD-MCNC: 4.6 G/DL
PROT SERPL-MCNC: 5.2 G/DL (ref 6–8.4)
PROT SERPL-MCNC: 5.8 G/DL (ref 6–8.4)
PROT SERPL-MCNC: 6.2 G/DL (ref 6–8.4)
PROT SERPL-MCNC: 7.8 G/DL (ref 6–8.4)
PROT UR QL STRIP: ABNORMAL
PROT UR QL STRIP: NEGATIVE
PROTHROMBIN TIME: 15.9 SEC (ref 9–12.5)
RA MAJOR: 6.65 CM
RA PRESSURE: 3 MMHG
RA WIDTH: 4.79 CM
RBC # BLD AUTO: 4.07 M/UL (ref 4–5.4)
RBC # BLD AUTO: 4.19 M/UL (ref 4–5.4)
RBC #/AREA URNS AUTO: 7 /HPF (ref 0–4)
RIGHT VENTRICULAR END-DIASTOLIC DIMENSION: 4.35 CM
RV TISSUE DOPPLER FREE WALL SYSTOLIC VELOCITY 1 (APICAL 4 CHAMBER VIEW): 8.08 CM/S
SAMPLE: ABNORMAL
SARS-COV-2 RDRP RESP QL NAA+PROBE: NEGATIVE
SINUS: 3.13 CM
SITE: ABNORMAL
SODIUM BLD-SCNC: 142 MMOL/L (ref 136–145)
SODIUM SERPL-SCNC: 135 MMOL/L (ref 136–145)
SODIUM SERPL-SCNC: 136 MMOL/L (ref 136–145)
SODIUM SERPL-SCNC: 137 MMOL/L (ref 136–145)
SODIUM SERPL-SCNC: 141 MMOL/L (ref 136–145)
SODIUM SERPL-SCNC: 144 MMOL/L (ref 136–145)
SP GR UR STRIP: 1.01 (ref 1–1.03)
SP GR UR STRIP: 1.02 (ref 1–1.03)
SP02: 60
SPECIMEN SOURCE: NORMAL
SPECIMEN SOURCE: NORMAL
SPONT RATE: 13
SPONT RATE: 14
SPONT RATE: 36
SPONT RATE: 41
SQUAMOUS #/AREA URNS AUTO: 6 /HPF
STJ: 3.03 CM
TDI LATERAL: 0.05 M/S
TDI SEPTAL: 0.04 M/S
TDI: 0.05 M/S
TR MAX PG: 47 MMHG
TRICUSPID ANNULAR PLANE SYSTOLIC EXCURSION: 1.29 CM
TROPONIN I SERPL DL<=0.01 NG/ML-MCNC: 1.84 NG/ML (ref 0–0.03)
TROPONIN I SERPL DL<=0.01 NG/ML-MCNC: 2.21 NG/ML (ref 0–0.03)
TV REST PULMONARY ARTERY PRESSURE: 50 MMHG
URN SPEC COLLECT METH UR: ABNORMAL
URN SPEC COLLECT METH UR: ABNORMAL
VANCOMYCIN TROUGH SERPL-MCNC: 12.4 UG/ML (ref 10–22)
WBC # BLD AUTO: 2.93 K/UL (ref 3.9–12.7)
WBC # BLD AUTO: 4.79 K/UL (ref 3.9–12.7)
WBC # FLD: 148 /CU MM
WBC #/AREA URNS AUTO: 10 /HPF (ref 0–5)
WBC CLUMPS UR QL AUTO: ABNORMAL
YEAST UR QL AUTO: ABNORMAL

## 2022-01-01 PROCEDURE — 94660 CPAP INITIATION&MGMT: CPT

## 2022-01-01 PROCEDURE — 82800 BLOOD PH: CPT

## 2022-01-01 PROCEDURE — 99239 PR HOSPITAL DISCHARGE DAY,>30 MIN: ICD-10-PCS | Mod: ,,, | Performed by: PHYSICIAN ASSISTANT

## 2022-01-01 PROCEDURE — 99291 CRITICAL CARE FIRST HOUR: CPT | Mod: 25,,, | Performed by: PHYSICIAN ASSISTANT

## 2022-01-01 PROCEDURE — 25000003 PHARM REV CODE 250: Performed by: INTERNAL MEDICINE

## 2022-01-01 PROCEDURE — 82945 GLUCOSE OTHER FLUID: CPT | Performed by: PHYSICIAN ASSISTANT

## 2022-01-01 PROCEDURE — 94761 N-INVAS EAR/PLS OXIMETRY MLT: CPT

## 2022-01-01 PROCEDURE — 83605 ASSAY OF LACTIC ACID: CPT | Performed by: STUDENT IN AN ORGANIZED HEALTH CARE EDUCATION/TRAINING PROGRAM

## 2022-01-01 PROCEDURE — 85014 HEMATOCRIT: CPT

## 2022-01-01 PROCEDURE — 83735 ASSAY OF MAGNESIUM: CPT | Performed by: PHYSICIAN ASSISTANT

## 2022-01-01 PROCEDURE — 36620 PR INSERT CATH,ART,PERCUT,SHORTTERM: ICD-10-PCS | Mod: ,,, | Performed by: PHYSICIAN ASSISTANT

## 2022-01-01 PROCEDURE — 82803 BLOOD GASES ANY COMBINATION: CPT

## 2022-01-01 PROCEDURE — 85025 COMPLETE CBC W/AUTO DIFF WBC: CPT | Performed by: PHYSICIAN ASSISTANT

## 2022-01-01 PROCEDURE — 89051 BODY FLUID CELL COUNT: CPT | Performed by: PHYSICIAN ASSISTANT

## 2022-01-01 PROCEDURE — 96375 TX/PRO/DX INJ NEW DRUG ADDON: CPT

## 2022-01-01 PROCEDURE — 84157 ASSAY OF PROTEIN OTHER: CPT | Performed by: PHYSICIAN ASSISTANT

## 2022-01-01 PROCEDURE — 99292 PR CRITICAL CARE, ADDL 30 MIN: ICD-10-PCS | Mod: 25,,, | Performed by: PHYSICIAN ASSISTANT

## 2022-01-01 PROCEDURE — 99220 PR INITIAL OBSERVATION CARE,LEVL III: ICD-10-PCS | Mod: ,,, | Performed by: PHYSICIAN ASSISTANT

## 2022-01-01 PROCEDURE — 83690 ASSAY OF LIPASE: CPT | Performed by: STUDENT IN AN ORGANIZED HEALTH CARE EDUCATION/TRAINING PROGRAM

## 2022-01-01 PROCEDURE — 80048 BASIC METABOLIC PNL TOTAL CA: CPT | Performed by: PHYSICIAN ASSISTANT

## 2022-01-01 PROCEDURE — 80202 ASSAY OF VANCOMYCIN: CPT | Performed by: INTERNAL MEDICINE

## 2022-01-01 PROCEDURE — 51702 INSERT TEMP BLADDER CATH: CPT

## 2022-01-01 PROCEDURE — 81001 URINALYSIS AUTO W/SCOPE: CPT | Performed by: PHYSICIAN ASSISTANT

## 2022-01-01 PROCEDURE — 96374 THER/PROPH/DIAG INJ IV PUSH: CPT

## 2022-01-01 PROCEDURE — 27000221 HC OXYGEN, UP TO 24 HOURS

## 2022-01-01 PROCEDURE — 80053 COMPREHEN METABOLIC PANEL: CPT | Performed by: PHYSICIAN ASSISTANT

## 2022-01-01 PROCEDURE — 99285 EMERGENCY DEPT VISIT HI MDM: CPT | Mod: CS,,, | Performed by: EMERGENCY MEDICINE

## 2022-01-01 PROCEDURE — 83605 ASSAY OF LACTIC ACID: CPT | Performed by: PHYSICIAN ASSISTANT

## 2022-01-01 PROCEDURE — 84484 ASSAY OF TROPONIN QUANT: CPT | Performed by: PHYSICIAN ASSISTANT

## 2022-01-01 PROCEDURE — 99239 HOSP IP/OBS DSCHRG MGMT >30: CPT | Mod: ,,, | Performed by: PHYSICIAN ASSISTANT

## 2022-01-01 PROCEDURE — 84100 ASSAY OF PHOSPHORUS: CPT | Performed by: PHYSICIAN ASSISTANT

## 2022-01-01 PROCEDURE — 20000000 HC ICU ROOM

## 2022-01-01 PROCEDURE — 84132 ASSAY OF SERUM POTASSIUM: CPT

## 2022-01-01 PROCEDURE — 63600175 PHARM REV CODE 636 W HCPCS: Performed by: PHYSICIAN ASSISTANT

## 2022-01-01 PROCEDURE — 82330 ASSAY OF CALCIUM: CPT

## 2022-01-01 PROCEDURE — 87040 BLOOD CULTURE FOR BACTERIA: CPT | Mod: 59 | Performed by: STUDENT IN AN ORGANIZED HEALTH CARE EDUCATION/TRAINING PROGRAM

## 2022-01-01 PROCEDURE — U0002 COVID-19 LAB TEST NON-CDC: HCPCS | Performed by: EMERGENCY MEDICINE

## 2022-01-01 PROCEDURE — 83880 ASSAY OF NATRIURETIC PEPTIDE: CPT | Performed by: STUDENT IN AN ORGANIZED HEALTH CARE EDUCATION/TRAINING PROGRAM

## 2022-01-01 PROCEDURE — 87205 SMEAR GRAM STAIN: CPT | Performed by: PHYSICIAN ASSISTANT

## 2022-01-01 PROCEDURE — G0378 HOSPITAL OBSERVATION PER HR: HCPCS

## 2022-01-01 PROCEDURE — 85610 PROTHROMBIN TIME: CPT | Performed by: PHYSICIAN ASSISTANT

## 2022-01-01 PROCEDURE — 93010 ELECTROCARDIOGRAM REPORT: CPT | Mod: ,,, | Performed by: INTERNAL MEDICINE

## 2022-01-01 PROCEDURE — 25000003 PHARM REV CODE 250: Performed by: PHYSICIAN ASSISTANT

## 2022-01-01 PROCEDURE — 99292 CRITICAL CARE ADDL 30 MIN: CPT | Mod: 25,,, | Performed by: PHYSICIAN ASSISTANT

## 2022-01-01 PROCEDURE — 37799 UNLISTED PX VASCULAR SURGERY: CPT

## 2022-01-01 PROCEDURE — 27000190 HC CPAP FULL FACE MASK W/VALVE

## 2022-01-01 PROCEDURE — 81003 URINALYSIS AUTO W/O SCOPE: CPT | Performed by: STUDENT IN AN ORGANIZED HEALTH CARE EDUCATION/TRAINING PROGRAM

## 2022-01-01 PROCEDURE — 99291 CRITICAL CARE FIRST HOUR: CPT | Mod: ,,, | Performed by: STUDENT IN AN ORGANIZED HEALTH CARE EDUCATION/TRAINING PROGRAM

## 2022-01-01 PROCEDURE — 82040 ASSAY OF SERUM ALBUMIN: CPT | Performed by: PHYSICIAN ASSISTANT

## 2022-01-01 PROCEDURE — 99291 PR CRITICAL CARE, E/M 30-74 MINUTES: ICD-10-PCS | Mod: 25,,, | Performed by: PHYSICIAN ASSISTANT

## 2022-01-01 PROCEDURE — 83615 LACTATE (LD) (LDH) ENZYME: CPT | Mod: 91 | Performed by: PHYSICIAN ASSISTANT

## 2022-01-01 PROCEDURE — 99291 PR CRITICAL CARE, E/M 30-74 MINUTES: ICD-10-PCS | Mod: ,,, | Performed by: STUDENT IN AN ORGANIZED HEALTH CARE EDUCATION/TRAINING PROGRAM

## 2022-01-01 PROCEDURE — 99285 PR EMERGENCY DEPT VISIT,LEVEL V: ICD-10-PCS | Mod: CS,,, | Performed by: EMERGENCY MEDICINE

## 2022-01-01 PROCEDURE — 93010 EKG 12-LEAD: ICD-10-PCS | Mod: ,,, | Performed by: INTERNAL MEDICINE

## 2022-01-01 PROCEDURE — 63600175 PHARM REV CODE 636 W HCPCS: Performed by: INTERNAL MEDICINE

## 2022-01-01 PROCEDURE — 84295 ASSAY OF SERUM SODIUM: CPT

## 2022-01-01 PROCEDURE — 99220 PR INITIAL OBSERVATION CARE,LEVL III: CPT | Mod: ,,, | Performed by: PHYSICIAN ASSISTANT

## 2022-01-01 PROCEDURE — 83615 LACTATE (LD) (LDH) ENZYME: CPT | Performed by: PHYSICIAN ASSISTANT

## 2022-01-01 PROCEDURE — 1158F ADVNC CARE PLAN TLK DOCD: CPT | Mod: CPTII,,, | Performed by: STUDENT IN AN ORGANIZED HEALTH CARE EDUCATION/TRAINING PROGRAM

## 2022-01-01 PROCEDURE — 99900035 HC TECH TIME PER 15 MIN (STAT)

## 2022-01-01 PROCEDURE — 80053 COMPREHEN METABOLIC PANEL: CPT | Performed by: STUDENT IN AN ORGANIZED HEALTH CARE EDUCATION/TRAINING PROGRAM

## 2022-01-01 PROCEDURE — 93005 ELECTROCARDIOGRAM TRACING: CPT

## 2022-01-01 PROCEDURE — 80076 HEPATIC FUNCTION PANEL: CPT | Performed by: PHYSICIAN ASSISTANT

## 2022-01-01 PROCEDURE — 36415 COLL VENOUS BLD VENIPUNCTURE: CPT | Performed by: STUDENT IN AN ORGANIZED HEALTH CARE EDUCATION/TRAINING PROGRAM

## 2022-01-01 PROCEDURE — 1158F PR ADVANCE CARE PLANNING DISCUSS DOCUMENTED IN MEDICAL RECORD: ICD-10-PCS | Mod: CPTII,,, | Performed by: STUDENT IN AN ORGANIZED HEALTH CARE EDUCATION/TRAINING PROGRAM

## 2022-01-01 PROCEDURE — 36620 INSERTION CATHETER ARTERY: CPT | Mod: ,,, | Performed by: PHYSICIAN ASSISTANT

## 2022-01-01 PROCEDURE — 85025 COMPLETE CBC W/AUTO DIFF WBC: CPT | Performed by: STUDENT IN AN ORGANIZED HEALTH CARE EDUCATION/TRAINING PROGRAM

## 2022-01-01 PROCEDURE — 99285 EMERGENCY DEPT VISIT HI MDM: CPT | Mod: 25

## 2022-01-01 PROCEDURE — 84155 ASSAY OF PROTEIN SERUM: CPT | Performed by: PHYSICIAN ASSISTANT

## 2022-01-01 PROCEDURE — 87075 CULTR BACTERIA EXCEPT BLOOD: CPT | Performed by: PHYSICIAN ASSISTANT

## 2022-01-01 PROCEDURE — 36620 INSERTION CATHETER ARTERY: CPT

## 2022-01-01 PROCEDURE — 36556 INSERT NON-TUNNEL CV CATH: CPT

## 2022-01-01 PROCEDURE — 87070 CULTURE OTHR SPECIMN AEROBIC: CPT | Performed by: PHYSICIAN ASSISTANT

## 2022-01-01 RX ORDER — NOREPINEPHRINE BITARTRATE/D5W 4MG/250ML
0-3 PLASTIC BAG, INJECTION (ML) INTRAVENOUS CONTINUOUS
Status: DISCONTINUED | OUTPATIENT
Start: 2022-01-01 | End: 2022-01-01 | Stop reason: HOSPADM

## 2022-01-01 RX ORDER — IPRATROPIUM BROMIDE AND ALBUTEROL SULFATE 2.5; .5 MG/3ML; MG/3ML
3 SOLUTION RESPIRATORY (INHALATION) EVERY 4 HOURS PRN
Status: DISCONTINUED | OUTPATIENT
Start: 2022-01-01 | End: 2022-01-01 | Stop reason: HOSPADM

## 2022-01-01 RX ORDER — FUROSEMIDE 10 MG/ML
80 INJECTION INTRAMUSCULAR; INTRAVENOUS ONCE
Status: COMPLETED | OUTPATIENT
Start: 2022-01-01 | End: 2022-01-01

## 2022-01-01 RX ORDER — SODIUM BICARBONATE 1 MEQ/ML
SYRINGE (ML) INTRAVENOUS
Status: DISCONTINUED
Start: 2022-01-01 | End: 2022-01-01 | Stop reason: HOSPADM

## 2022-01-01 RX ORDER — PROCHLORPERAZINE EDISYLATE 5 MG/ML
5 INJECTION INTRAMUSCULAR; INTRAVENOUS EVERY 6 HOURS PRN
Status: DISCONTINUED | OUTPATIENT
Start: 2022-01-01 | End: 2022-01-01 | Stop reason: HOSPADM

## 2022-01-01 RX ORDER — GLUCAGON 1 MG
1 KIT INJECTION
Status: DISCONTINUED | OUTPATIENT
Start: 2022-01-01 | End: 2022-01-01 | Stop reason: HOSPADM

## 2022-01-01 RX ORDER — LIDOCAINE HYDROCHLORIDE 10 MG/ML
10 INJECTION INFILTRATION; PERINEURAL ONCE
Status: DISCONTINUED | OUTPATIENT
Start: 2022-01-01 | End: 2022-01-01

## 2022-01-01 RX ORDER — GLUCAGON 1 MG
1 KIT INJECTION
Status: DISCONTINUED | OUTPATIENT
Start: 2022-01-01 | End: 2022-01-01

## 2022-01-01 RX ORDER — GLYCERIN 1 G/1
1 SUPPOSITORY RECTAL ONCE
Status: DISCONTINUED | OUTPATIENT
Start: 2022-01-01 | End: 2022-01-01 | Stop reason: HOSPADM

## 2022-01-01 RX ORDER — IBUPROFEN 200 MG
16 TABLET ORAL
Status: DISCONTINUED | OUTPATIENT
Start: 2022-01-01 | End: 2022-01-01 | Stop reason: HOSPADM

## 2022-01-01 RX ORDER — ACETAMINOPHEN 325 MG/1
650 TABLET ORAL EVERY 4 HOURS PRN
Status: DISCONTINUED | OUTPATIENT
Start: 2022-01-01 | End: 2022-01-01 | Stop reason: HOSPADM

## 2022-01-01 RX ORDER — IBUPROFEN 200 MG
24 TABLET ORAL
Status: DISCONTINUED | OUTPATIENT
Start: 2022-01-01 | End: 2022-01-01 | Stop reason: HOSPADM

## 2022-01-01 RX ORDER — NOREPINEPHRINE BITARTRATE/D5W 4MG/250ML
PLASTIC BAG, INJECTION (ML) INTRAVENOUS
Status: DISPENSED
Start: 2022-01-01 | End: 2022-01-01

## 2022-01-01 RX ORDER — AMLODIPINE BESYLATE 10 MG/1
10 TABLET ORAL DAILY
Status: DISCONTINUED | OUTPATIENT
Start: 2022-01-01 | End: 2022-01-01

## 2022-01-01 RX ORDER — FUROSEMIDE 10 MG/ML
80 INJECTION INTRAMUSCULAR; INTRAVENOUS
Status: DISCONTINUED | OUTPATIENT
Start: 2022-01-01 | End: 2022-01-01

## 2022-01-01 RX ORDER — NALOXONE HCL 0.4 MG/ML
0.02 VIAL (ML) INJECTION
Status: DISCONTINUED | OUTPATIENT
Start: 2022-01-01 | End: 2022-01-01 | Stop reason: HOSPADM

## 2022-01-01 RX ORDER — MAG HYDROX/ALUMINUM HYD/SIMETH 200-200-20
30 SUSPENSION, ORAL (FINAL DOSE FORM) ORAL 4 TIMES DAILY PRN
Status: DISCONTINUED | OUTPATIENT
Start: 2022-01-01 | End: 2022-01-01 | Stop reason: HOSPADM

## 2022-01-01 RX ORDER — TALC
6 POWDER (GRAM) TOPICAL NIGHTLY PRN
Status: DISCONTINUED | OUTPATIENT
Start: 2022-01-01 | End: 2022-01-01 | Stop reason: HOSPADM

## 2022-01-01 RX ORDER — FAMOTIDINE 10 MG/ML
20 INJECTION INTRAVENOUS DAILY
Status: CANCELLED | OUTPATIENT
Start: 2022-01-01

## 2022-01-01 RX ORDER — SIMETHICONE 80 MG
1 TABLET,CHEWABLE ORAL 4 TIMES DAILY PRN
Status: DISCONTINUED | OUTPATIENT
Start: 2022-01-01 | End: 2022-01-01 | Stop reason: HOSPADM

## 2022-01-01 RX ORDER — ONDANSETRON 8 MG/1
8 TABLET, ORALLY DISINTEGRATING ORAL EVERY 8 HOURS PRN
Status: DISCONTINUED | OUTPATIENT
Start: 2022-01-01 | End: 2022-01-01 | Stop reason: HOSPADM

## 2022-01-01 RX ORDER — SODIUM CHLORIDE 0.9 % (FLUSH) 0.9 %
10 SYRINGE (ML) INJECTION EVERY 8 HOURS PRN
Status: DISCONTINUED | OUTPATIENT
Start: 2022-01-01 | End: 2022-01-01 | Stop reason: HOSPADM

## 2022-01-01 RX ORDER — POLYETHYLENE GLYCOL 3350 17 G/17G
17 POWDER, FOR SOLUTION ORAL DAILY PRN
Status: DISCONTINUED | OUTPATIENT
Start: 2022-01-01 | End: 2022-01-01 | Stop reason: HOSPADM

## 2022-01-01 RX ORDER — INSULIN ASPART 100 [IU]/ML
0-5 INJECTION, SOLUTION INTRAVENOUS; SUBCUTANEOUS EVERY 6 HOURS PRN
Status: DISCONTINUED | OUTPATIENT
Start: 2022-01-01 | End: 2022-01-01 | Stop reason: HOSPADM

## 2022-01-01 RX ORDER — FLUCONAZOLE 2 MG/ML
200 INJECTION, SOLUTION INTRAVENOUS
Status: DISCONTINUED | OUTPATIENT
Start: 2022-01-01 | End: 2022-01-01 | Stop reason: HOSPADM

## 2022-01-01 RX ADMIN — SODIUM CHLORIDE, SODIUM LACTATE, POTASSIUM CHLORIDE, AND CALCIUM CHLORIDE 1000 ML: .6; .31; .03; .02 INJECTION, SOLUTION INTRAVENOUS at 01:01

## 2022-01-01 RX ADMIN — FLUCONAZOLE 200 MG: 2 INJECTION, SOLUTION INTRAVENOUS at 09:01

## 2022-01-01 RX ADMIN — Medication 0.35 MCG/KG/MIN: at 02:01

## 2022-01-01 RX ADMIN — Medication 0.5 MCG/KG/MIN: at 04:01

## 2022-01-01 RX ADMIN — FUROSEMIDE 80 MG: 10 INJECTION INTRAMUSCULAR; INTRAVENOUS at 01:01

## 2022-01-01 RX ADMIN — PIPERACILLIN AND TAZOBACTAM 4.5 G: 4; .5 INJECTION, POWDER, LYOPHILIZED, FOR SOLUTION INTRAVENOUS; PARENTERAL at 04:01

## 2022-01-01 RX ADMIN — Medication 0.5 MCG/KG/MIN: at 06:01

## 2022-01-01 RX ADMIN — HYDROCORTISONE SODIUM SUCCINATE 100 MG: 100 INJECTION, POWDER, FOR SOLUTION INTRAMUSCULAR; INTRAVENOUS at 06:01

## 2022-01-01 RX ADMIN — Medication 0.5 MCG/KG/MIN: at 01:01

## 2022-01-01 RX ADMIN — VASOPRESSIN 0.04 UNITS/MIN: 20 INJECTION INTRAVENOUS at 02:01

## 2022-01-01 RX ADMIN — VANCOMYCIN HYDROCHLORIDE 1250 MG: 1.25 INJECTION, POWDER, LYOPHILIZED, FOR SOLUTION INTRAVENOUS at 02:01

## 2022-01-01 RX ADMIN — Medication 0.5 MCG/KG/MIN: at 07:01

## 2022-01-01 RX ADMIN — SODIUM CHLORIDE, SODIUM LACTATE, POTASSIUM CHLORIDE, AND CALCIUM CHLORIDE 1000 ML: .6; .31; .03; .02 INJECTION, SOLUTION INTRAVENOUS at 05:01

## 2022-01-01 RX ADMIN — PIPERACILLIN AND TAZOBACTAM 4.5 G: 4; .5 INJECTION, POWDER, LYOPHILIZED, FOR SOLUTION INTRAVENOUS; PARENTERAL at 09:01

## 2022-01-01 RX ADMIN — VASOPRESSIN 0.04 UNITS/MIN: 20 INJECTION INTRAVENOUS at 09:01

## 2022-01-01 RX ADMIN — PIPERACILLIN AND TAZOBACTAM 4.5 G: 4; .5 INJECTION, POWDER, LYOPHILIZED, FOR SOLUTION INTRAVENOUS; PARENTERAL at 06:01

## 2022-01-01 RX ADMIN — VASOPRESSIN 0.04 UNITS/MIN: 20 INJECTION INTRAVENOUS at 06:01

## 2022-01-12 NOTE — TELEPHONE ENCOUNTER
Spoke with the patients daughter renuka regarding home health and she said that she is more concerned about her legs swelling. She is concerned that they are going to become open sores. She also said that  can use who ever he used last time-Cori OLSEN

## 2022-01-12 NOTE — TELEPHONE ENCOUNTER
----- Message from Kathy Hill sent at 1/10/2022  4:09 PM CST -----  Contact: [phone] pts daughter gael  Pts daughter Sunitha is calling in regards to home health and has not heard back on anything just yet.please advise and give a return call

## 2022-01-13 NOTE — TELEPHONE ENCOUNTER
Spoke with the patients daughter (gael) and let her know that we have faxed the orders over for [facility] to get Home health started. Daughter is also fighting covid at the time so I advised her to rest and stay hydrated.     Cori OLSEN MA

## 2022-01-13 NOTE — TELEPHONE ENCOUNTER
Hi, please send this to [facility], I think that is where we send home health orders for [facility] patients. I would check with Kathy to make sure.  Please let the daughter know we are sending in the orders.  Thank you, Chago Jones

## 2022-01-13 NOTE — TELEPHONE ENCOUNTER
----- Message from Tessie Herrera sent at 1/13/2022  8:50 AM CST -----  Contact: [phone]  Patient is returning a phone call.  Who left a message for the patient: Dr Jones's office  Does patient know what this is regarding:  no  Would you like a call back, or a response through your [facility] portal?:   phone  Comments:

## 2022-01-19 PROBLEM — E87.5 HYPERKALEMIA: Status: ACTIVE | Noted: 2022-01-01

## 2022-01-19 PROBLEM — N17.9 AKI (ACUTE KIDNEY INJURY): Status: ACTIVE | Noted: 2022-01-01

## 2022-01-19 PROBLEM — J90 PLEURAL EFFUSION: Status: ACTIVE | Noted: 2022-01-01

## 2022-01-19 NOTE — Clinical Note
Diagnosis: Pleural effusion [015231]   Future Attending Provider: PARKER MARRERO [[id number]]   Is the patient being sent to ED Observation?: Yes   Admitting Provider:: PARKER MARRERO [[id number]]

## 2022-01-20 PROBLEM — J96.02 ACUTE HYPERCAPNIC RESPIRATORY FAILURE: Status: ACTIVE | Noted: 2022-01-01

## 2022-01-20 PROBLEM — I83.002 VENOUS STASIS ULCER OF CALF: Status: ACTIVE | Noted: 2022-01-01

## 2022-01-20 PROBLEM — I95.9 HYPOTENSION: Status: ACTIVE | Noted: 2022-01-01

## 2022-01-20 PROBLEM — R23.3 SPONTANEOUS ECCHYMOSIS: Status: ACTIVE | Noted: 2022-01-01

## 2022-01-20 PROBLEM — S31.809A GLUTEAL CLEFT WOUND: Status: ACTIVE | Noted: 2022-01-01

## 2022-01-20 PROBLEM — I50.43 ACUTE ON CHRONIC COMBINED SYSTOLIC AND DIASTOLIC HEART FAILURE: Status: ACTIVE | Noted: 2022-01-01

## 2022-01-20 PROBLEM — L97.209 VENOUS STASIS ULCER OF CALF: Status: ACTIVE | Noted: 2022-01-01

## 2022-01-20 PROBLEM — G93.41 ENCEPHALOPATHY, METABOLIC: Status: ACTIVE | Noted: 2022-01-01

## 2022-01-20 NOTE — ASSESSMENT & PLAN NOTE
- CXR with large R pleural effusion  - possibly 2/2 hypervolemia, diuresing with IV lasix  - pulmonology consulted to evaluate for need of diagnostic/therapeutic thoracentesis  - monitor O2 requirements

## 2022-01-20 NOTE — PROGRESS NOTES
[facility] - [facility]  Progress Note    Patient Name: Celina Parra  MRN: [id number]  Patient Class: OP- Observation   Admission Date: 1/19/2022  Length of Stay: 0 days  Attending Physician: Juan Underwood MD  Primary Care Provider: Chago Jones MD        Subjective:     Principal Problem:Hypotension        HPI:  Celina Parra is a 88 y.o. female with HFrEF (EF 35%, G3DD), AFib (on Xarelto), CKD3, and venous stasis ulcers who presented to [facility] for worsening fatigue, inattentiveness, and decreased PO intake x 3 days. At baseline the patient is oriented x 3 and very active, although she is wheelchair bound. Over the last 3 days she has been sleeping throughout the day, not eating, and barely talking only responding yes/no to questions. Currently, patient is barely talking and requires numerous prompts to follow commands. Daughter was recently diagnosed with COVID and made efforts to avoid her mother as the patient is not vaccinated. No fevers, chills, chest pain, cough, abdominal pain, or dysuria.  Her last echo in 2019 showed EF 35%, grade III LVDD, moderately decreased LV systolic function, PAP 64 mmHg.  Her daughter says she last took her Xarelto 20 mg on 1/18.      In [facility]: VSSAF. O2 98% on RA. CBC without leukocytosis. CMP with K 5.8, Cr 2.5. UA without infection. CT head without acute hemorrhage or infarct. Chest imaging with large right pleural effusion with near complete atelectasis of the entire right lung.  CT also shows enlarged prevascular lymph node measuring 2.6 x 3.9 cm., numerous hepatic hypodense lesions, and an ascending aortic fusiform aneurysm.    Since arrival to floor, her oxygen requirements have increased: now satting 93% on 3L NC.  Pulmonology has been consulted to evaluate for need of diagnostic/therapeutic thoracentesis.      Overview/Hospital Course:  Pulmonary consulted, planning for upcoming thoracentesis.     Pt noted to be hypotensive to 70s/40s, unable  to volume resuccitate given fluid-overload. Also noted to be less responsive. Rapid response called. Critical care fellow consulted, plan to transfer to MICU for higher level of care.      Past Medical History:   Diagnosis Date    Anticoagulant long-term use     Arthritis     Atrial fibrillation     Cataract     Cataract associated with another disorder     CHF (congestive heart failure) 1/10/2013    Hypertension     Potassium (K) deficiency     Stroke        Past Surgical History:   Procedure Laterality Date    HYSTERECTOMY         Review of patient's allergies indicates:   Allergen Reactions    Iodine and iodide containing products Hives    Sulfa (sulfonamide antibiotics) Hives       No current facility-administered medications on file prior to encounter.     Current Outpatient Medications on File Prior to Encounter   Medication Sig    amLODIPine (NORVASC) 10 MG tablet TAKE 1 TABLET BY MOUTH EVERY DAY    bumetanide (BUMEX) 1 MG tablet TAKE 1 TABLET BY MOUTH TWICE A DAY    ciclopirox (PENLAC) 8 % Soln Apply topically nightly. (Patient not taking: Reported on 6/28/2021)    diclofenac sodium (VOLTAREN) 1 % Gel APPLY 2 GRAMS TOPICALLY TO AFFECTED AREA ONCE DAILY    ferrous sulfate (FEOSOL) 325 mg (65 mg iron) Tab tablet Take 1 tablet (325 mg total) by mouth daily with breakfast. (Patient not taking: Reported on 6/28/2021)    fluticasone propionate (FLONASE) 50 mcg/actuation nasal spray 2 sprays (100 mcg total) by Each Nostril route once daily.    metoprolol succinate (TOPROL-XL) 50 MG 24 hr tablet Take 3 tablets (150 mg total) by mouth once daily.    potassium chloride SA (K-DUR,KLOR-CON) 20 MEQ tablet TAKE 1 TABLET BY MOUTH ONCE DAILY    rivaroxaban (XARELTO) 20 mg Tab Take 1 tablet (20 mg total) by mouth once daily.     Family History     Problem Relation (Age of Onset)    Hypertension Mother, Sister, Brother        Tobacco Use    Smoking status: Never Smoker    Smokeless tobacco: Not on file    Substance and Sexual Activity    Alcohol use: No    Drug use: No    Sexual activity: Not on file     Review of Systems   Unable to perform ROS: Mental status change     Objective:     Vital Signs (Most Recent):  Temp: 98.2 °F (36.8 °C) (22 2305)  Pulse: 66 (22 0500)  Resp: 20 (22 0500)  BP: 103/63 (22 0500)  SpO2: (!) 92 % (22 0500) Vital Signs (24h Range):  Temp:  [97.6 °F (36.4 °C)-98.2 °F (36.8 °C)] 98.2 °F (36.8 °C)  Pulse:  [56-70] 66  Resp:  [15-22] 20  SpO2:  [92 %-100 %] 92 %  BP: (103-111)/(57-70) 103/63     Weight: 66.2 kg (146 lb)  Body mass index is 25.06 kg/m².    Physical Exam  Vitals and nursing note reviewed.   Constitutional:       Appearance: She is well-developed. She is ill-appearing.      Comments: Lethargic, however responds yes/no to questions and follows commands   HENT:      Head: Normocephalic and atraumatic.      Mouth/Throat:      Mouth: Mucous membranes are moist.   Eyes:      General: No scleral icterus.     Extraocular Movements: Extraocular movements intact.   Cardiovascular:      Rate and Rhythm: Normal rate and regular rhythm.      Heart sounds: Normal heart sounds.   Pulmonary:      Effort: Tachypnea present. No respiratory distress.      Breath sounds: Decreased breath sounds (Right mid and lower) and rales present. No wheezing.   Chest:      Comments: Large ecchymosis noted to right chest extending to right flank. No TTP  Abdominal:      General: Bowel sounds are normal.      Palpations: Abdomen is soft.      Tenderness: There is no abdominal tenderness.      Comments: Periumbilical ecchymosis. No abdominal tenderness on exam. Bowel sounds normal.   Musculoskeletal:         General: No tenderness. Normal range of motion.      Cervical back: Normal range of motion and neck supple.      Right lower le+ Edema present.      Left lower le+ Edema present.   Skin:     General: Skin is warm and dry.      Capillary Refill: Capillary refill takes  less than 2 seconds.      Findings: Bruising present. No rash.   Neurological:      General: No focal deficit present.      Cranial Nerves: No cranial nerve deficit.      Comments: Only responding to questions with yes/no answers. Unable to determine orientation. No focal neurologic deficit. No facial droop.   Psychiatric:         Behavior: Behavior normal.         Thought Content: Thought content normal.         Judgment: Judgment normal.         Significant Labs:   All pertinent labs within the past 24 hours have been reviewed.  CBC:   Recent Labs   Lab 01/19/22 2153   WBC 4.79   HGB 12.2   HCT 41.2        CMP:   Recent Labs   Lab 01/19/22 2153      K 5.8*      CO2 21*   *   BUN 54*   CREATININE 2.5*   CALCIUM 10.5   PROT 7.8   ALBUMIN 3.1*   BILITOT 1.2*   ALKPHOS 63   AST 62*   ALT 16   ANIONGAP 18*   EGFRNONAA 16.7*     Cardiac Markers:   Recent Labs   Lab 01/19/22 2153   *     Urine Culture: No results for input(s): LABURIN in the last 48 hours.  Urine Studies:   Recent Labs   Lab 01/19/22 2153   COLORU Margaret   APPEARANCEUA Hazy*   PHUR 5.0   SPECGRAV 1.015   PROTEINUA Negative   GLUCUA Negative   KETONESU Negative   BILIRUBINUA Negative   OCCULTUA Negative   NITRITE Negative   LEUKOCYTESUR Negative       Significant Imaging: I have reviewed all pertinent imaging results/findings within the past 24 hours.  CT: I have reviewed all pertinent results/findings within the past 24 hours and my personal findings are:   Large right pleural effusion with near complete atelectasis of the entire right lung. Enlarged prevascular lymph node measuring 2.6 x 3.9 cm. Numerous hepatic hypodense lesions   CXR: I have reviewed all pertinent results/findings within the past 24 hours and my personal findings are:  Large right pleural effusion      Assessment/Plan:      * Hypotension  - Acute events this morning as documented  - Daughter updated, wants pt to remain full code  - Critical care  consulted  - Transfer to MICU for higher level of care    Acute on chronic combined systolic and diastolic heart failure  - patient with significant bilateral LE edema  - CXR with pleural effusion, requiring 2L NC  -   - last echo in 2019 with EF 35%, grade III LVDD, moderately decreased LV systolic function,  PAP 64 mmHg  - repeat TTE ordered  - home regimen: bumex 1 mg BID  - hospital diuresis: lasix 80 mg IV BID  - continue metoprolol XL, amlodipine  - monitor response with strict I&Os, daily weights, O2 requirements  - cardiac, fluid restricted diet    Spontaneous ecchymosis  - significant periumbilical and R breast/flank ecchymosis noted on exam. No tenderness. Per daughter this appeared within the last couple of days. No fall or trauma  - H/H stable at baseline 12.2/41/2  - no abdominal complaints or tenderness on exam  - CT C/A/P ordered to rule out hemorrhage in setting of DEXTER and pleural effusion    Large pleural effusion  CT chest with with large pleural effusion with near complete atelectasis of the entire right lung.  DDX of underlying etiology includes infection, malignancy,and hypervolemia (although hypervolemia would typically show as bilateral effusion).  Last Xarelto dose 2 days ago.    Risks and benefits of a thoracocentesis explained to pt's daughter, including elevated bleeding risk due to her last Xarelto dose and DEXTER, and the needle positioning required by the patient's current mental state.  Daughter is discussing with her family.  Although we would certainly try to remove as much fluid as possible, our chief aim in doing a paracentesis would be to diagnose causal etiology.    RECOMMENDATIONS:  - Hold home Xarelto  - We will attempt a thoracentesis tomorrow if family consents and if clinically indicated at that time  - Continue diuresing with IV lasix 80 mg  - Monitor O2 requirements    DEXTER (acute kidney injury)  Stage 3 chronic kidney disease    - Cr 2.5, BUN 54 (baseline 1, 22)  -  suspect 2/2 hypervolemia  - UA without infection  - monitor closely with diuresis  - renally dose medications  - avoid nephrotoxins    Hyperkalemia  - K 5.8  - s/p 80 mg IV lasix  - monitor with daily labs    Chronic atrial fibrillation  Current long term use of oral anticoagulation    - continue metoprolol  daily  - continue xarelto 20 mg daily    Essential hypertension  - continue amlodipine, metoprolol    Pulmonary HTN  Last echo in 2019 with EF 35%, grade III LVDD, moderately decreased LV systolic function, PAP 64 mmHg.  TTE 1/19/22: EF 53%, PAP 50 mmHg.  Recommend continued diuresis.    H/O: CVA (cerebrovascular accident)  - continue ASA, statin      VTE Risk Mitigation (From admission, onward)         Ordered     Reason for No Pharmacological VTE Prophylaxis  Once        Question:  Reasons:  Answer:  Already adequately anticoagulated on oral Anticoagulants    01/19/22 2334     IP VTE HIGH RISK PATIENT  Once         01/19/22 2334     Place sequential compression device  Until discontinued         01/19/22 2334                Discharge Planning   PAUL: 1/24/2022     Code Status: Full Code   Is the patient medically ready for discharge?:     Reason for patient still in hospital (select all that apply): Patient unstable  Discharge Plan A: Home Health        I spent >30 minutes critical care time in the care and management of his patient, including discussions with family, coordination of care with ICU, rapid, response team, bedside RN, and pt's daughter, in addition to interpretation of lab data and imaging studies. Transfer to MICU for higher level of care.    Juan Underwood MD  Attending Physician  Department of Hospital Medicine  Epic secure chat preferred, or SpectraLink ext. [phone]  1/20/2022

## 2022-01-20 NOTE — CODE/ RAPID DOCUMENTATION
RAPID RESPONSE NURSE NOTE        Admit Date: 2022  LOS: 0  Code Status: Full Code   Date of Consult: 2022  : 1933  Age: 88 y.o.  Weight:   Wt Readings from Last 1 Encounters:   22 66 kg (145 lb 8.1 oz)     Sex: female  Race: Black or    Bed: [address] [place] [address] A:   MRN: [id number]  Time Rapid Response Team page Received: 1205  Time Rapid Response Team at Bedside: 1207  Time Rapid Response Team left Bedside: 1300  Was the patient discharged from an ICU this admission? No   Was the patient discharged from a PACU within last 24 hours? No   Did the patient receive conscious sedation/general anesthesia in last 24 hours? No  Was the patient in the ED within the past 24 hours? Yes  Was the patient on NIPPV within the past 24 hours? No   Did this progress into an ARC or CPA: no  Attending Physician: Grzegorz Felix MD  Primary Service: Pulmonary Disease       SITUATION    Notified by bedside RN via phone call.  Reason for alert: hypotension  Called to evaluate the patient for Circulatory    BACKGROUND     Why is the patient in the hospital?: Hypotension    Patient has a past medical history of Anticoagulant long-term use, Arthritis, Atrial fibrillation, Cataract, Cataract associated with another disorder, CHF (congestive heart failure), Hypertension, Potassium (K) deficiency, and Stroke.    Last Vitals:  Temp: 96.3 °F (35.7 °C) ( 1147)  Pulse: 71 ( 1147) Comment: afib per telemetry  Resp: 20 ( 0500)  BP: 70/59 ( 1150)  SpO2: 92 % ( 0500)    24 Hours Vitals Range:  Temp:  [96.3 °F (35.7 °C)-98.2 °F (36.8 °C)]   Pulse:  [56-71]   Resp:  [15-22]   BP: ()/(53-70)   SpO2:  [92 %-100 %]     Labs:  Recent Labs     22   WBC 4.79   HGB 12.2   HCT 41.2          Recent Labs     22      K 5.8*      CO2 21*   CREATININE 2.5*   *        No results for input(s): PH, PCO2, PO2, HCO3, POCSATURATED, BE in the last 72 hours.      ASSESSMENT    Physical Exam  Constitutional:       Appearance: She is ill-appearing.   Cardiovascular:      Pulses:           Radial pulses are 2+ on the right side and 2+ on the left side.   Pulmonary:      Effort: Pulmonary effort is normal.   Musculoskeletal:      Right lower le+ Edema present.      Left lower le+ Edema present.   Skin:     General: Skin is dry.      Findings: Bruising and rash present.   Neurological:      Mental Status: She is lethargic.         INTERVENTIONS    Patient laying in bed. She is minimally responsive to painful stimuli (grunts only). Bedside nurse states that this is how she has been since admit. BP 70/59. Patient in trendelenburg, Repeat BP with 83/48 (53). Critical care to bedside. Decision to transfer to ICU and start levophed gtt. Levophed gtt was initiated on floor. Peripheral IV x2 inserted. Patient transferred to [id number] with bedside monitor, ambu bag, O2 @ 4L and levophed gtt.     RECOMMENDATIONS    We recommend:     -Goals of care conversation  -pressor support  -Transfer to ICU  -per Critical care medicine    PROVIDER ESCALATION    Orders received and case discussed with Dr. Hoffman.    Disposition: Tx in ICU bed 9072.    FOLLOW UP    Charge Larisa MUHAMMAD updated on plan of care. Instructed to call the Rapid Response Nurse, Shereen Sanchez RN at [phone] for additional questions or concerns.

## 2022-01-20 NOTE — ASSESSMENT & PLAN NOTE
- Acute events this morning as documented  - Daughter updated, wants pt to remain full code  - Critical care consulted  - Transfer to MICU for higher level of care

## 2022-01-20 NOTE — SUBJECTIVE & OBJECTIVE
Past Medical History:   Diagnosis Date    Anticoagulant long-term use     Arthritis     Atrial fibrillation     Cataract     Cataract associated with another disorder     CHF (congestive heart failure) 1/10/2013    Hypertension     Potassium (K) deficiency     Stroke        Past Surgical History:   Procedure Laterality Date    HYSTERECTOMY         Review of patient's allergies indicates:   Allergen Reactions    Iodine and iodide containing products Hives    Sulfa (sulfonamide antibiotics) Hives       No current facility-administered medications on file prior to encounter.     Current Outpatient Medications on File Prior to Encounter   Medication Sig    amLODIPine (NORVASC) 10 MG tablet TAKE 1 TABLET BY MOUTH EVERY DAY    bumetanide (BUMEX) 1 MG tablet TAKE 1 TABLET BY MOUTH TWICE A DAY    ciclopirox (PENLAC) 8 % Soln Apply topically nightly. (Patient not taking: Reported on 6/28/2021)    diclofenac sodium (VOLTAREN) 1 % Gel APPLY 2 GRAMS TOPICALLY TO AFFECTED AREA ONCE DAILY    ferrous sulfate (FEOSOL) 325 mg (65 mg iron) Tab tablet Take 1 tablet (325 mg total) by mouth daily with breakfast. (Patient not taking: Reported on 6/28/2021)    fluticasone propionate (FLONASE) 50 mcg/actuation nasal spray 2 sprays (100 mcg total) by Each Nostril route once daily.    metoprolol succinate (TOPROL-XL) 50 MG 24 hr tablet Take 3 tablets (150 mg total) by mouth once daily.    potassium chloride SA (K-DUR,KLOR-CON) 20 MEQ tablet TAKE 1 TABLET BY MOUTH ONCE DAILY    rivaroxaban (XARELTO) 20 mg Tab Take 1 tablet (20 mg total) by mouth once daily.     Family History     Problem Relation (Age of Onset)    Hypertension Mother, Sister, Brother        Tobacco Use    Smoking status: Never Smoker    Smokeless tobacco: Not on file   Substance and Sexual Activity    Alcohol use: No    Drug use: No    Sexual activity: Not on file     Review of Systems   Unable to perform ROS: Mental status change     Objective:      Vital Signs (Most Recent):  Temp: 98.2 °F (36.8 °C) (22 2305)  Pulse: 66 (22 0500)  Resp: 20 (22 0500)  BP: 103/63 (22 0500)  SpO2: (!) 92 % (22 0500) Vital Signs (24h Range):  Temp:  [97.6 °F (36.4 °C)-98.2 °F (36.8 °C)] 98.2 °F (36.8 °C)  Pulse:  [56-70] 66  Resp:  [15-22] 20  SpO2:  [92 %-100 %] 92 %  BP: (103-111)/(57-70) 103/63     Weight: 66.2 kg (146 lb)  Body mass index is 25.06 kg/m².    Physical Exam  Vitals and nursing note reviewed.   Constitutional:       Appearance: She is well-developed. She is ill-appearing.      Comments: Lethargic, however responds yes/no to questions and follows commands   HENT:      Head: Normocephalic and atraumatic.      Mouth/Throat:      Mouth: Mucous membranes are moist.   Eyes:      General: No scleral icterus.     Extraocular Movements: Extraocular movements intact.   Cardiovascular:      Rate and Rhythm: Normal rate and regular rhythm.      Heart sounds: Normal heart sounds.   Pulmonary:      Effort: Tachypnea present. No respiratory distress.      Breath sounds: Decreased breath sounds (Right mid and lower) and rales present. No wheezing.   Chest:      Comments: Large ecchymosis noted to right chest extending to right flank. No TTP  Abdominal:      General: Bowel sounds are normal.      Palpations: Abdomen is soft.      Tenderness: There is no abdominal tenderness.      Comments: Periumbilical ecchymosis. No abdominal tenderness on exam. Bowel sounds normal.   Musculoskeletal:         General: No tenderness. Normal range of motion.      Cervical back: Normal range of motion and neck supple.      Right lower le+ Edema present.      Left lower le+ Edema present.   Skin:     General: Skin is warm and dry.      Capillary Refill: Capillary refill takes less than 2 seconds.      Findings: Bruising present. No rash.   Neurological:      General: No focal deficit present.      Cranial Nerves: No cranial nerve deficit.      Comments: Only  responding to questions with yes/no answers. Unable to determine orientation. No focal neurologic deficit. No facial droop.   Psychiatric:         Behavior: Behavior normal.         Thought Content: Thought content normal.         Judgment: Judgment normal.         Significant Labs:   All pertinent labs within the past 24 hours have been reviewed.  CBC:   Recent Labs   Lab 01/19/22 2153   WBC 4.79   HGB 12.2   HCT 41.2        CMP:   Recent Labs   Lab 01/19/22 2153      K 5.8*      CO2 21*   *   BUN 54*   CREATININE 2.5*   CALCIUM 10.5   PROT 7.8   ALBUMIN 3.1*   BILITOT 1.2*   ALKPHOS 63   AST 62*   ALT 16   ANIONGAP 18*   EGFRNONAA 16.7*     Cardiac Markers:   Recent Labs   Lab 01/19/22 2153   *     Urine Culture: No results for input(s): LABURIN in the last 48 hours.  Urine Studies:   Recent Labs   Lab 01/19/22 2153   COLORU Margaret   APPEARANCEUA Hazy*   PHUR 5.0   SPECGRAV 1.015   PROTEINUA Negative   GLUCUA Negative   KETONESU Negative   BILIRUBINUA Negative   OCCULTUA Negative   NITRITE Negative   LEUKOCYTESUR Negative       Significant Imaging: I have reviewed all pertinent imaging results/findings within the past 24 hours.  CT: I have reviewed all pertinent results/findings within the past 24 hours and my personal findings are:   Large right pleural effusion with near complete atelectasis of the entire right lung. Enlarged prevascular lymph node measuring 2.6 x 3.9 cm. Numerous hepatic hypodense lesions   CXR: I have reviewed all pertinent results/findings within the past 24 hours and my personal findings are:  Large right pleural effusion

## 2022-01-20 NOTE — ASSESSMENT & PLAN NOTE
CT chest with with large pleural effusion with near complete atelectasis of the entire right lung.  Last Xarelto dose 2 days ago.  WBC WNL and pt afebrile, but pt with profound acute mental status change in setting of nl CTH.  DDX of underlying effusion etiology includes infection, malignancy,and hypervolemia (although hypervolemia would typically show as bilateral effusion, unilateral hypervolemia-driven effusions are not out of the question).      Risks and benefits of a thoracocentesis explained to pt's daughter, including elevated bleeding risk due to her last Xarelto dose and DEXTER, and the needle positioning required by the patient's current mental state.  Daughter is discussing with her family.  Although we would certainly try to remove as much fluid as possible, our chief aim in doing a paracentesis would be to diagnose causal etiology.    RECOMMENDATIONS:  - Hold home Xarelto  - Serum LDH level  - We will attempt a thoracentesis tomorrow if family consents and if clinically indicated at that time.  - Continue diuresing with IV lasix 80 mg.  - Monitor O2 requirements: continuous pulse ox as she is already on a telemetry floor.  - Follow-up on the COVID Routine Screening we have ordered.  Although CT Chest did not have ground glass opacities, her case is one of a COVID risk score 6 in unvaccinated 88 y.o. currently in tenuous clinical condition. Only negative test was a Rapid (80% sensitivity) done in ED.  Missed COVID in this pt could be devastating.

## 2022-01-20 NOTE — SUBJECTIVE & OBJECTIVE
Past Medical History:   Diagnosis Date    Anticoagulant long-term use     Arthritis     Atrial fibrillation     Cataract     Cataract associated with another disorder     CHF (congestive heart failure) 1/10/2013    Hypertension     Potassium (K) deficiency     Stroke        Past Surgical History:   Procedure Laterality Date    HYSTERECTOMY         Review of patient's allergies indicates:   Allergen Reactions    Iodine and iodide containing products Hives    Sulfa (sulfonamide antibiotics) Hives       Family History     Problem Relation (Age of Onset)    Hypertension Mother, Sister, Brother        Tobacco Use    Smoking status: Never Smoker    Smokeless tobacco: Not on file   Substance and Sexual Activity    Alcohol use: No    Drug use: No    Sexual activity: Not on file      Review of Systems   Unable to perform ROS: Mental status change     Objective:     Vital Signs (Most Recent):  Temp: (!) 94.1 °F (34.5 °C) (01/20/22 1505)  Pulse: (!) 114 (01/20/22 1505)  Resp: (!) 29 (01/20/22 1505)  BP: 111/75 (01/20/22 1447)  SpO2: (!) 92 % (01/20/22 1423) Vital Signs (24h Range):  Temp:  [94.1 °F (34.5 °C)-98.2 °F (36.8 °C)] 94.1 °F (34.5 °C)  Pulse:  [] 114  Resp:  [7-36] 29  SpO2:  [92 %-100 %] 92 %  BP: ()/(53-90) 111/75  Arterial Line BP: (109)/(71) 109/71   Weight: 66 kg (145 lb 8.1 oz)  Body mass index is 24.98 kg/m².      Intake/Output Summary (Last 24 hours) at 1/20/2022 1603  Last data filed at 1/20/2022 1540  Gross per 24 hour   Intake 720.06 ml   Output 0 ml   Net 720.06 ml       Physical Exam  Vitals reviewed.   Constitutional:       Appearance: She is well-developed and well-nourished. She is ill-appearing.      Interventions: Nasal cannula in place.   HENT:      Head: Normocephalic and atraumatic.   Eyes:      Extraocular Movements: EOM normal.      Pupils:      Left eye: Pupil is round and reactive.      Comments: Right cataract   Neck:      Thyroid: No thyromegaly.      Trachea:  No tracheal deviation.   Cardiovascular:      Rate and Rhythm: Normal rate and regular rhythm.      Heart sounds: No murmur heard.  No friction rub. No gallop.    Pulmonary:      Effort: Tachypnea and accessory muscle usage present.      Breath sounds: Examination of the right-upper field reveals decreased breath sounds and rhonchi. Examination of the left-upper field reveals rhonchi. Examination of the right-middle field reveals decreased breath sounds. Examination of the right-lower field reveals decreased breath sounds. Decreased breath sounds and rhonchi present. No wheezing or rales.   Abdominal:      General: Bowel sounds are decreased.      Palpations: Abdomen is soft.      Tenderness: There is abdominal tenderness.   Genitourinary:     Comments: Coelho in place.   Musculoskeletal:         General: Normal range of motion.      Right lower leg: 3+ Edema present.      Left lower leg: 3+ Edema present.   Skin:     General: Skin is warm and dry.   Neurological:      Mental Status: She is lethargic.      GCS: GCS eye subscore is 2. GCS verbal subscore is 1. GCS motor subscore is 4.      Sensory: No sensory deficit.         Vents:  Oxygen Concentration (%): 96 (01/20/22 0854)  Lines/Drains/Airways     Central Venous Catheter Line            Percutaneous Central Line Insertion/Assessment - Triple Lumen  01/20/22 1345 right internal jugular <1 day          Drain                 Urethral Catheter 01/20/22 1330 Double-lumen 16 Fr. <1 day          Arterial Line            Arterial Line 01/20/22 1420 Right Radial <1 day          Peripheral Intravenous Line                 Peripheral IV - Single Lumen 22 G Left Hand -- days         Peripheral IV - Single Lumen 09/24/15 0540 Right Forearm 2310 days              Significant Labs:    CBC/Anemia Profile:  Recent Labs   Lab 01/19/22  2153 01/20/22  1435 01/20/22  1445   WBC 4.79 2.93*  --    HGB 12.2 12.0  --    HCT 41.2 38.7 38    151  --    MCV 98 95  --    RDW 14.8*  14.7*  --         Chemistries:  Recent Labs   Lab 01/19/22  2153 01/20/22  1435    141   K 5.8* 5.0    106   CO2 21* 24   BUN 54* 70*   CREATININE 2.5* 2.7*   CALCIUM 10.5 9.3   ALBUMIN 3.1* 2.6*   PROT 7.8 6.2   BILITOT 1.2* 1.5*   ALKPHOS 63 63   ALT 16 18   AST 62* 67*   MG  --  2.1   PHOS  --  5.6*       All pertinent labs within the past 24 hours have been reviewed.    Significant Imaging: I have reviewed all pertinent imaging results/findings within the past 24 hours.

## 2022-01-20 NOTE — PROCEDURES
"Celina Parra is a 88 y.o. female patient.    Temp: (!) 94.1 °F (34.5 °C) (22)  Pulse: (!) 116 (22 1423)  Resp: (!) 7 (22)  BP: 99/70 (22)  SpO2: (!) 92 % (22)  Weight: 66 kg (145 lb 8.1 oz) (22 08)  Height: 5' 4" (162.6 cm) (22 08)       Arterial Line    Date/Time: 2022 2:39 PM  Location procedure was performed: [facility] CRITICAL CARE MEDICINE  Performed by: Elana Ryan PA-C  Authorized by: Elana Ryan PA-C   Pre-op Diagnosis: shock  Post-operative diagnosis: shock  Consent Done: Yes  Consent: Verbal consent obtained.  Risks and benefits: risks, benefits and alternatives were discussed  Consent given by: daughter.  Procedure consent: procedure consent matches procedure scheduled  Required items: required blood products, implants, devices, and special equipment available  Patient identity confirmed: , name and MRN  Time out: Immediately prior to procedure a "time out" was called to verify the correct patient, procedure, equipment, support staff and site/side marked as required.  Preparation: Patient was prepped and draped in the usual sterile fashion.  Indications: multiple ABGs, respiratory failure and hemodynamic monitoring  Location: right radial  Needle gauge: 20  Seldinger technique: Seldinger technique used  Number of attempts: 2  Post-procedure: line sutured and dressing applied  Post-procedure CMS: normal  Patient tolerance: Patient tolerated the procedure well with no immediate complications  Comments: During first attempt, hematoma formed at site of needle insertion. Pressure held and hematoma resolved.           2022  "

## 2022-01-20 NOTE — PT/OT/SLP PROGRESS
Speech Language Pathology      Celina Parra  MRN: [id number]    Patient not seen today secondary to transferring off the floor to ICU. ST will f/u at a later date/time with new orders.

## 2022-01-20 NOTE — ASSESSMENT & PLAN NOTE
CT chest with with large pleural effusion with near complete atelectasis of the entire right lung.  DDX of underlying etiology includes infection, malignancy,and hypervolemia (although hypervolemia would typically show as bilateral effusion).  Last Xarelto dose 2 days ago.    Risks and benefits of a thoracocentesis explained to pt's daughter, including elevated bleeding risk due to her last Xarelto dose and DEXTER, and the needle positioning required by the patient's current mental state.  Daughter is discussing with her family.  Although we would certainly try to remove as much fluid as possible, our chief aim in doing a paracentesis would be to diagnose causal etiology.    RECOMMENDATIONS:  - Hold home Xarelto  - We will attempt a thoracentesis tomorrow if family consents and if clinically indicated at that time  - Continue diuresing with IV lasix 80 mg  - Monitor O2 requirements

## 2022-01-20 NOTE — PLAN OF CARE
[facility] - Cardiology Stepdown  Initial Discharge Assessment       Primary Care Provider: Chago Jones MD    Admission Diagnosis: A-fib [I48.91]  Weakness [R53.1]  Pleural effusion [J90]  Chest pain [R07.9]  Acute renal failure, unspecified acute renal failure type [N17.9]    Admission Date: 1/19/2022  Expected Discharge Date: 1/24/2022    Discharge Barriers Identified: None    Payor: [employer] / Plan: [employer] / Product Type: Medicare Advantage /     Extended Emergency Contact Information  Primary Emergency Contact: Maribel Parra  [address] [address] [address] [facility] [place]  Home Phone: [phone]  Relation: Daughter    Discharge Plan A: Home Health  Discharge Plan B: Other (TBD)      [id number] - [place], [place] - [address]  [address]  [place] [address]  Phone: [phone] Fax: [phone]      Initial Assessment (most recent)     Adult Discharge Assessment - 01/20/22 1133        Discharge Assessment    Assessment Type Discharge Planning Assessment     Confirmed/corrected address, phone number and insurance Yes     Confirmed Demographics Correct on Facesheet     Source of Information family     If unable to respond/provide information was family/caregiver contacted? Yes     Contact Name/Number Maribel, the patient's daughter     Communicated PAUL with patient/caregiver Yes     Lives With alone     Do you expect to return to your current living situation? Yes     Do you have help at home or someone to help you manage your care at home? Yes     Who are your caregiver(s) and their phone number(s)? [facility] and the patient's daughter's assist her     Prior to hospitilization cognitive status: Not Oriented to Time;Not Oriented to Place;Not Oriented to Person     Current cognitive status: Not Oriented to Person;Not Oriented to Place;Not Oriented to Time     Walking or Climbing Stairs Difficulty ambulation difficulty,  requires equipment     Dressing/Bathing Difficulty none   Per daughter, the patient can transfer in and out the wheelchair and likes to bathe herself before her recent sickness    Equipment Currently Used at Home wheelchair;hospital bed;power chair     Readmission within 30 days? No     Do you currently have service(s) that help you manage your care at home? Yes     Name and Contact number of agency [facility]     Is the pt/caregiver preference to resume services with current agency Yes     Do you take prescription medications? Yes     Do you have prescription coverage? Yes     Do you have any problems affording any of your prescribed medications? No     Is the patient taking medications as prescribed? yes     Who is going to help you get home at discharge? Family     Are you on dialysis? No     Do you take coumadin? No     Discharge Plan A Home Health     Discharge Plan B Other   TBD    DME Needed Upon Discharge  none     Discharge Plan discussed with: Adult children     Discharge Barriers Identified None                  Delaney Ott RN, CM   Ext: [phone]

## 2022-01-20 NOTE — ASSESSMENT & PLAN NOTE
Stage 3 chronic kidney disease    - Cr 2.5, BUN 54 (baseline 1, 22)  - suspect 2/2 hypervolemia  - UA without infection  - monitor closely with diuresis  - renally dose medications  - avoid nephrotoxins

## 2022-01-20 NOTE — ASSESSMENT & PLAN NOTE
- patient with significant bilateral LE edema  - CXR with pleural effusion, requiring 2L NC  -   - last echo in 2019 with EF 35%, grade III LVDD, moderately decreased LV systolic function,  PAP 64 mmHg  - repeat TTE ordered  - home regimen: bumex 1 mg BID  - hospital diuresis: lasix 80 mg IV BID  - continue metoprolol XL, amlodipine  - monitor response with strict I&Os, daily weights, O2 requirements  - cardiac, fluid restricted diet

## 2022-01-20 NOTE — PROCEDURES
"Celina Parra is a 88 y.o. female patient.    Temp: (!) 94.1 °F (34.5 °C) (01/20/22 1423)  Pulse: (!) 116 (01/20/22 1423)  Resp: (!) 7 (01/20/22 1423)  BP: 99/70 (01/20/22 1423)  SpO2: (!) 92 % (01/20/22 1423)  Weight: 66 kg (145 lb 8.1 oz) (01/20/22 0800)  Height: 5' 4" (162.6 cm) (01/20/22 0800)       Central Line    Date/Time: 1/20/2022 2:43 PM  Performed by: Elana Ryan PA-C  Authorized by: Elana Ryan PA-C     Location procedure was performed:  [facility] CRITICAL CARE MEDICINE  Pre-operative diagnosis:  Shock  Post-operative diagnosis:  Shock  Consent Done ?:  Yes  Time out complete?: Verified correct patient, procedure, equipment, staff, and site/side    Indications:  Med administration and vascular access  Anesthesia:  Local infiltration  Local anesthetic:  Lidocaine 1% without epinephrine  Anesthetic total (ml):  1  Preparation:  Skin prepped with ChloraPrep  Skin prep agent dried: Skin prep agent completely dried prior to procedure    Sterile barriers: All five maximal sterile barriers used - gloves, gown, cap, mask and large sterile sheet    Hand hygiene: Hand hygiene performed immediately prior to central venous catheter insertion    Location:  Right internal jugular  Catheter size:  7 Fr  Inserted Catheter Length (cm):  16  Ultrasound guidance: Yes    Vessel Caliber:  Large  Comprressibility:  Normal  Needle advanced into vessel with real time ultrasound guidance.    Guidewire confirmed in vessel.    Steril sheath on probe.    Sterile gel used.  Manometry: Yes    Number of attempts:  1  Securement:  Line sutured, chlorhexidine patch, sterile dressing applied and blood return through all ports  Complications: No    Estimated blood loss (mL):  1  Specimens: No    Implants: No    XRay:  Placement verified by x-ray, tip termination and successful placement  Adverse Events:  NoneTermination Site: right atrium        1/20/2022  "

## 2022-01-20 NOTE — CONSULTS
Consult received. Patient to be transferred to the CMICU. Full H&P to follow.    Elana Ryan PA-C  Critical Care Medicine  1/20/2022   12:59 PM

## 2022-01-20 NOTE — HPI
Celina Parra is a 88 y.o. female with HFrEF (EF 50%, G3DD, PAP 50), AFib (on Xarelto), CKD3, immobility and venous stasis ulcers who presented to [facility] on 1/19/2022 for worsening fatigue and decreased PO intake x 3 days. Patient lives with daughter, Maxine, and at baseline is oriented x 3. She is wheelchair bound but able to make her own food and bathe. Daughter reports patient has been more fatigued and not acting herself for the last 3 days which prompted presentation to the ED.     In the ED, patient arrived hemodynamically stable with AMS. Labs signficiant for DEXTER, hyperkalemia, and elevated BNP. CXR with large right sided pleural effusion. CT head negative for acute intracranial abnormalities. CT chest/abd/pelvis with significant right sided pleural effusion, multiple liver lesions, stercoral colitis, and possible fluid collection in gluteal folds. Patient was placed in observation unit per hospital medicine for heart failure exacerbation and DEXTER. She received one dose of furosemide with no UOP.     On morning of 1/20, patient with worsening hypotension. Rapid response called. Critical care medicine consulted for shock. Patient transferred to MICU hemodynamically unstable and started on norepinephrine with profound encephalopathy.

## 2022-01-20 NOTE — PLAN OF CARE
[facility] Care Plan    POC reviewed with Celinahakeem Parra and family at 1600. Pt unable to verbalize understanding. Questions and concerns addressed with family by provider. Pt upgrade from CSU this afternoon with hypotension, hypothermia, AMS, multiple wounds. Art line, cvc, jimenez, temp probe placed, warming blanket in place, levo and vaso infusing per order. Pt on bipap. Fluid bolus per order, pancultured, Abx given. Thoracentesis pending.  Will continue to monitor. See below and flowsheets for full assessment and VS info.       Neuro:  [place] Plane Coma Scale  Best Eye Response: 4-->(E4) spontaneous  Best Motor Response: 5-->(M5) localizes pain  Best Verbal Response: 2-->(V2) incomprehensible speech  Po Coma Scale Score: 11  Assessment Qualifiers: patient not sedated/intubated        24 hr Temp:  [93.92 °F (34.4 °C)-98.2 °F (36.8 °C)]     CV:   Rhythm: atrial rhythm  BP goals:   SBP < 180  MAP > 65    Resp:   O2 Device (Oxygen Therapy): BiPAP  Oxygen Concentration (%): 96    Plan:  Bipap    GI/:        Last Bowel Movement: 01/20/22  Voiding Characteristics: incontinence    Intake/Output Summary (Last 24 hours) at 1/20/2022 1716  Last data filed at 1/20/2022 1700  Gross per 24 hour   Intake 1703.96 ml   Output 0 ml   Net 1703.96 ml     Unmeasured Output  Urine Occurrence: 1  Stool Occurrence: 1    Labs/Accuchecks:  Recent Labs   Lab 01/20/22  1435 01/20/22  1445   WBC 2.93*  --    RBC 4.07  --    HGB 12.0  --    HCT 38.7 38     --       Recent Labs   Lab 01/20/22  1435      K 5.0   CO2 24      BUN 70*   CREATININE 2.7*   ALKPHOS 63   ALT 18   AST 67*   BILITOT 1.5*      Recent Labs   Lab 01/20/22  1435   INR 1.5*      Recent Labs   Lab 01/20/22  1449   TROPONINI 2.208*       Electrolytes: No replacement orders  Accuchecks: Q6H    Gtts:   NORepinephrine bitartrate-D5W 0.5 mcg/kg/min (01/20/22 1700)    vasopressin 0.04 Units/min (01/20/22 1700)       LDA/Wounds:  Lines/Drains/Airways       Central  Venous Catheter Line              Percutaneous Central Line Insertion/Assessment - Triple Lumen  01/20/22 1345 right internal jugular <1 day              Drain                   Urethral Catheter 01/20/22 1330 Double-lumen 16 Fr. <1 day              Arterial Line              Arterial Line 01/20/22 1420 Right Radial <1 day              Peripheral Intravenous Line                   Peripheral IV - Single Lumen 22 G Left Hand -- days         Peripheral IV - Single Lumen 09/24/15 0540 Right Forearm 2310 days                  Wounds: Yes  Wound care consulted: Yes

## 2022-01-20 NOTE — ED NOTES
"Patient presenting to ED with c/o fatigue, decreased appetite + ams. Per the daughter-- the patient was last seen normal 2 days ago. According to the daughter, the patient is usually alert and oriented at baseline & able to get around well using her wheelchair. Over the past few days, the patient has been increasingly more lethargic, has been eating less, and has "just not been doing nothing" per daughter. Patient is currently nonverbal + not following commands, and responsive to pain. BG in triage in the 160s. 3+ pitting edema to the bilateral lower extremities. Daughter was recently sick with covid.  "

## 2022-01-20 NOTE — ASSESSMENT & PLAN NOTE
- significant periumbilical and R breast/flank ecchymosis noted on exam. No tenderness. Per daughter this appeared within the last couple of days. No fall or trauma  - H/H stable at baseline 12.2/41/2  - no abdominal complaints or tenderness on exam  - CT C/A/P ordered to rule out hemorrhage in setting of DEXTER and pleural effusion

## 2022-01-20 NOTE — ASSESSMENT & PLAN NOTE
Previous Echo (2019): EF 35, PAP 64, LVDD.  Repeat Echo 1/19/22: EF 53, PAP 50, LVDD.  Recommend continued diuresis.

## 2022-01-20 NOTE — ASSESSMENT & PLAN NOTE
Current long term use of oral anticoagulation    - continue metoprolol  daily  - continue xarelto 20 mg daily

## 2022-01-20 NOTE — HPI
Celina Parra is a 88 y.o. female with HFrEF (EF 35%, G3DD), AFib (on Xarelto), CKD3, and venous stasis ulcers who [place] [facility] [place] fatigue, inattentiveness, and decreased PO intake x 3 days. At baseline the patient is oriented x 3 and very active, although she is wheelchair bound. Over the last 3 days she has been sleeping throughout the day, not eating, and barely talking only responding yes/no to questions. Currently, patient is barely talking and requires numerous prompts to follow commands. Daughter was recently diagnosed with COVID and made efforts to avoid her mother as the patient is not vaccinated. No fevers, chills, chest pain, cough, abdominal pain, or dysuria.  Her last echo in 2019 showed EF 35%, grade III LVDD, moderately decreased LV systolic function, PAP 64 mmHg.  Her daughter says she last took her Xarelto 20 mg on 1/18.      In [facility]ED: VSSAF. O2 98% on RA. CBC without leukocytosis. CMP with K 5.8, Cr 2.5. UA without infection. CT head without acute hemorrhage or infarct. Chest imaging with large right pleural effusion with near complete atelectasis of the entire right lung.  CT also shows enlarged prevascular lymph node measuring 2.6 x 3.9 cm., numerous hepatic hypodense lesions, and an ascending aortic fusiform aneurysm.    Since arrival to floor, her oxygen requirements have increased: now satting 93% on 3L NC.  Pulmonology has been consulted to evaluate for need of diagnostic/therapeutic thoracentesis.

## 2022-01-20 NOTE — ASSESSMENT & PLAN NOTE
Last echo in 2019 with EF 35%, grade III LVDD, moderately decreased LV systolic function, PAP 64 mmHg.  TTE 1/19/22: EF 53%, PAP 50 mmHg.  Recommend continued diuresis.

## 2022-01-20 NOTE — HOSPITAL COURSE
Pulmonary consulted, planning for upcoming thoracentesis.     Pt noted to be hypotensive to 70s/40s, unable to volume resuccitate given fluid-overload. Also noted to be less responsive. Rapid response called. Critical care fellow consulted, plan to transfer to MICU for higher level of care.

## 2022-01-20 NOTE — CONSULTS
[facility] - [facility] Step[facility]  Pulmonology  Consult Note    Patient Name: Celina Parra  MRN: [id number]  Admission Date: 1/19/2022  Hospital Length of Stay: 0 days  Code Status: Full Code  Attending Physician: Juan Underwood MD  Primary Care Provider: Chago Jones MD   Principal Problem: Acute on chronic combined systolic and diastolic heart failure    Inpatient consult to Pulmonology  Consult performed by: Zac Simons MD  Consult ordered by: Kassidy Amado PA-C  Reason for consult: Large R-sided pleural effusion eval and management        Subjective:     HPI:  Celina Parra is a 88 y.o. female with HFrEF (EF 50%, G3DD, PAP 50), AFib (on Xarelto), CKD3, and venous stasis ulcers who presented to [facility] [place] for worsening fatigue, inattentiveness, and decreased PO intake x 3 days. At baseline the patient is oriented x 3 and very active, although she is wheelchair bound. Over the last 3 days she has been sleeping throughout the day, not eating, and barely talking only responding yes/no to questions. Currently, patient is barely talking and requires numerous prompts to follow commands. Daughter was recently diagnosed with COVID and made efforts to avoid her mother as the patient is not vaccinated. No fevers, chills, chest pain, cough, abdominal pain, or dysuria.  Her last echo in 2019 showed EF 35%, grade III LVDD, moderately decreased LV systolic function, PAP 64 mmHg.  Her daughter says she last took her Xarelto 20 mg on 1/18.       In [facility]ED: VSSAF. O2 98% on RA. CBC without leukocytosis. CMP with K 5.8, Cr 2.5. UA without infection. CT head without acute hemorrhage or infarct. Chest imaging with large right pleural effusion with near complete atelectasis of the entire right lung.  CT also shows enlarged prevascular lymph node measuring 2.6 x 3.9 cm., numerous hepatic hypodense lesions, and an ascending aortic fusiform aneurysm.     Since arrival to floor, her oxygen requirements have increased: now satting  93% on 3L NC.  Pulmonology has been consulted to evaluate for need of diagnostic/therapeutic thoracentesis.    Past Medical History:   Diagnosis Date    Anticoagulant long-term use     Arthritis     Atrial fibrillation     Cataract     Cataract associated with another disorder     CHF (congestive heart failure) 1/10/2013    Hypertension     Potassium (K) deficiency     Stroke        Past Surgical History:   Procedure Laterality Date    HYSTERECTOMY         Review of patient's allergies indicates:   Allergen Reactions    Iodine and iodide containing products Hives    Sulfa (sulfonamide antibiotics) Hives       Family History     Problem Relation (Age of Onset)    Hypertension Mother, Sister, Brother        Tobacco Use    Smoking status: Never Smoker    Smokeless tobacco: Not on file   Substance and Sexual Activity    Alcohol use: No    Drug use: No    Sexual activity: Not on file         Review of Systems   Unable to perform ROS: Mental status change     Objective:     Vital Signs (Most Recent):  Temp: 98.2 °F (36.8 °C) (01/19/22 2305)  Pulse: 71 (01/20/22 0854)  Resp: 20 (01/20/22 0500)  BP: 90/60 (01/20/22 0854)  SpO2: (!) 92 % (01/20/22 0500) Vital Signs (24h Range):  Temp:  [97.6 °F (36.4 °C)-98.2 °F (36.8 °C)] 98.2 °F (36.8 °C)  Pulse:  [56-71] 71  Resp:  [15-22] 20  SpO2:  [92 %-100 %] 92 %  BP: ()/(57-70) 90/60     Weight: 66 kg (145 lb 8.1 oz)  Body mass index is 24.98 kg/m².    No intake or output data in the 24 hours ending 01/20/22 1026    Physical Exam  Vitals and nursing note reviewed.   Constitutional:       Appearance: She is well-developed. She is ill-appearing.      Comments: Lethargic, however responds yes/no to questions and follows commands   HENT:      Head: Normocephalic and atraumatic.      Mouth/Throat:      Mouth: Mucous membranes are moist.   Eyes:      General: No scleral icterus.     Extraocular Movements: Extraocular movements intact.   Cardiovascular:      Rate and  Rhythm: Normal rate and regular rhythm.      Heart sounds: Normal heart sounds.   Pulmonary:      Effort: Tachypnea present. No respiratory distress.      Breath sounds: Decreased breath sounds (Right mid and lower) and rales present. No wheezing.   Chest:      Comments: Large ecchymosis noted to right chest extending to right flank. No TTP  Abdominal:      General: Bowel sounds are normal.      Palpations: Abdomen is soft.      Tenderness: There is no abdominal tenderness.      Comments: Periumbilical ecchymosis. No abdominal tenderness on exam. Bowel sounds normal.   Musculoskeletal:         General: No tenderness. Normal range of motion.      Cervical back: Normal range of motion and neck supple.      Right lower le+ Edema present.      Left lower le+ Edema present.   Skin:     General: Skin is warm and dry.      Capillary Refill: Capillary refill takes less than 2 seconds.      Findings: Bruising present. No rash.   Neurological:      General: No focal deficit present.      Cranial Nerves: No cranial nerve deficit.      Comments: Not responding to questions, only to sternal rub. Unable to determine orientation. No focal neurologic deficit. No facial droop.   Psychiatric:         Behavior: Behavior normal.         Thought Content: Thought content normal.         Judgment: Judgment normal.         Vents:       Lines/Drains/Airways     Peripheral Intravenous Line                 Peripheral IV - Single Lumen 22 G Left Hand -- days         Peripheral IV - Single Lumen 09/24/15 0540 Right Forearm 2310 days                Significant Labs:    CBC/Anemia Profile:  Recent Labs   Lab 22   WBC 4.79   HGB 12.2   HCT 41.2      MCV 98   RDW 14.8*        Chemistries:  Recent Labs   Lab 223      K 5.8*      CO2 21*   BUN 54*   CREATININE 2.5*   CALCIUM 10.5   ALBUMIN 3.1*   PROT 7.8   BILITOT 1.2*   ALKPHOS 63   ALT 16   AST 62*       CMP:   Recent Labs   Lab 22  2153   NA  144   K 5.8*      CO2 21*   *   BUN 54*   CREATININE 2.5*   CALCIUM 10.5   PROT 7.8   ALBUMIN 3.1*   BILITOT 1.2*   ALKPHOS 63   AST 62*   ALT 16   ANIONGAP 18*   EGFRNONAA 16.7*     Cardiac Markers: No results for input(s): CKMB, TROPONINT, MYOGLOBIN in the last 48 hours.  Coagulation: No results for input(s): PT, INR, APTT in the last 48 hours.  Urine Studies:   Recent Labs   Lab 01/19/22  2153   COLORU Margaret   APPEARANCEUA Hazy*   PHUR 5.0   SPECGRAV 1.015   PROTEINUA Negative   GLUCUA Negative   KETONESU Negative   BILIRUBINUA Negative   OCCULTUA Negative   NITRITE Negative   LEUKOCYTESUR Negative     All pertinent labs within the past 24 hours have been reviewed.  WBC 4.7.    Significant Imaging:   I have reviewed all pertinent imaging results/findings within the past 24 hours.  CT: I have reviewed all pertinent results/findings within the past 24 hours and my personal findings are:  Large R pleural effusion consuming almost entire R lung  CXR: I have reviewed all pertinent results/findings within the past 24 hours and my personal findings are:  L R pleural effusion  Echo: I have reviewed all pertinent results/findings within the past 24 hours and my personal findings are:  EF 35, LVDD, PAP 50    Assessment/Plan:     Pulmonary HTN  Previous Echo (2019): EF 35, PAP 64, LVDD.  Repeat Echo 1/19/22: EF 53, PAP 50, LVDD.  Recommend continued diuresis.    Large pleural effusion  CT chest with with large pleural effusion with near complete atelectasis of the entire right lung.  Last Xarelto dose 2 days ago.  WBC WNL and pt afebrile, but pt with profound acute mental status change in setting of nl CTH.  DDX of underlying effusion etiology includes infection, malignancy,and hypervolemia (although hypervolemia would typically show as bilateral effusion, unilateral hypervolemia-driven effusions are not out of the question).      Risks and benefits of a thoracocentesis explained to pt's daughter, including  elevated bleeding risk due to her last Xarelto dose and DEXTER, and the needle positioning required by the patient's current mental state.  Daughter is discussing with her family.  Although we would certainly try to remove as much fluid as possible, our chief aim in doing a paracentesis would be to diagnose causal etiology.    RECOMMENDATIONS:  - Hold home Xarelto  - Serum LDH level  - We will attempt a thoracentesis tomorrow if family consents and if clinically indicated at that time.  - Continue diuresing with IV lasix 80 mg.  - Monitor O2 requirements: continuous pulse ox as she is already on a telemetry floor.  - Follow-up on the COVID Routine Screening we have ordered.  Although CT Chest did not have ground glass opacities, her case is one of a COVID risk score 6 in unvaccinated 88 y.o. currently in tenuous clinical condition. Only negative test was a Rapid (80% sensitivity) done in ED.  Missed COVID in this pt could be devastating.        Thank you for your consult. I will follow-up with patient. Please contact us if you have any additional questions.     Zac Simons MD  Pulmonology  [facility]

## 2022-01-20 NOTE — PLAN OF CARE
Recv'd Pt from ED vis stretcher. Pt does not open eyes but is moving bottom lip up and down. Pt appears to open her eyes when her name is called. Pt did appear to squeeze hand when commanded but unsure if it was purposeful. No rest distress or pain noted by facial expressions or body language. Daughter reports that she thinks mother is in pain. Daughter reports that 3-4 days ago pt was able to transfer self from w/c, and feed, toilet , dress self. Pt has no cough. Pt presents with multiple bruises allover body , open wounds to bilat buttock anf bilat lower extrem. Bilat legs +4 pitting edema, skin cool to touch, unable to obtain temp at this time.  Redness to front and back yeny area. Wound team consulted. A fib on the monitor, bradying into 40-50's. PIV intact and paten, no iv fluids or drips . Pt is incont with brief, bm upon arrival. Completed assessment with daughter. Will cont to monitor.

## 2022-01-20 NOTE — H&P
[facility] - Emergency Dept  [facility] Medicine  History & Physical    Patient Name: Celina Parra  MRN: [id number]  Patient Class: OP- Observation  Admission Date: 1/19/2022  Attending Physician: Juan Underwood MD   Primary Care Provider: Chago Jones MD         Patient information was obtained from patient, past medical records and ER records.     Subjective:     Principal Problem:Acute on chronic combined systolic and diastolic heart failure    Chief Complaint:   Chief Complaint   Patient presents with    Fatigue     Nonverbal at bedside. Bedbound. Bilateral arm weakness. Pt lives alone. Sitter was out with covid and not sure if anyone has been able to come see her        HPI: Celina Parra is a 88 y.o. female with HFrEF (EF 35%, G3DD), AFib (on Xarelto), CKD3, and venous stasis ulcers who was brought to the ED by daughter for worsening fatigue, inattentiveness, and decreased PO intake x 3 days. At baseline the patient is oriented x 3 and very active, although she is wheelchair bound. Over the last 3 days she has been sleeping throughout the day, not eating, and barely talking only responding yes/no to questions. Currently, patient is barely talking and requires numerous prompts to follow commands. Daughter was recently diagnosed with COVID and made efforts to avoid her mother as the patient is not vaccinated. Daughter reached out to PCP to obtain HH mostly for patient's venous stasis leg wounds but felt she was becoming worse at home and more sleepy so decided to bring her in rather than wait for HH nurse. The patient has not been complaining of anything in particular over the last few days. No fevers, chills, chest pain, cough, abdominal pain, or dysuria.    ED: VSSAF. O2 98% on RA. CBC without leukocytosis. CMP with K 5.8, Cr 2.5. UA without infection. CT head without acute hemorrhage or infarct. CXR with large R sided pleural effusion.       Past Medical History:   Diagnosis Date    Anticoagulant long-term  use     Arthritis     Atrial fibrillation     Cataract     Cataract associated with another disorder     CHF (congestive heart failure) 1/10/2013    Hypertension     Potassium (K) deficiency     Stroke        Past Surgical History:   Procedure Laterality Date    HYSTERECTOMY         Review of patient's allergies indicates:   Allergen Reactions    Iodine and iodide containing products Hives    Sulfa (sulfonamide antibiotics) Hives       No current facility-administered medications on file prior to encounter.     Current Outpatient Medications on File Prior to Encounter   Medication Sig    amLODIPine (NORVASC) 10 MG tablet TAKE 1 TABLET BY MOUTH EVERY DAY    bumetanide (BUMEX) 1 MG tablet TAKE 1 TABLET BY MOUTH TWICE A DAY    ciclopirox (PENLAC) 8 % Soln Apply topically nightly. (Patient not taking: Reported on 6/28/2021)    diclofenac sodium (VOLTAREN) 1 % Gel APPLY 2 GRAMS TOPICALLY TO AFFECTED AREA ONCE DAILY    ferrous sulfate (FEOSOL) 325 mg (65 mg iron) Tab tablet Take 1 tablet (325 mg total) by mouth daily with breakfast. (Patient not taking: Reported on 6/28/2021)    fluticasone propionate (FLONASE) 50 mcg/actuation nasal spray 2 sprays (100 mcg total) by Each Nostril route once daily.    metoprolol succinate (TOPROL-XL) 50 MG 24 hr tablet Take 3 tablets (150 mg total) by mouth once daily.    potassium chloride SA (K-DUR,KLOR-CON) 20 MEQ tablet TAKE 1 TABLET BY MOUTH ONCE DAILY    rivaroxaban (XARELTO) 20 mg Tab Take 1 tablet (20 mg total) by mouth once daily.     Family History     Problem Relation (Age of Onset)    Hypertension Mother, Sister, Brother        Tobacco Use    Smoking status: Never Smoker    Smokeless tobacco: Not on file   Substance and Sexual Activity    Alcohol use: No    Drug use: No    Sexual activity: Not on file     Review of Systems   Unable to perform ROS: Mental status change     Objective:     Vital Signs (Most Recent):  Temp: 97.6 °F (36.4 °C) (01/19/22  1815)  Pulse: 66 (22 2315)  Resp: (!) 22 (22 2325)  BP: 111/68 (22 2305)  SpO2: 96 % (22 2305) Vital Signs (24h Range):  Temp:  [97.6 °F (36.4 °C)] 97.6 °F (36.4 °C)  Pulse:  [58-66] 66  Resp:  [16-22] 22  SpO2:  [96 %-100 %] 96 %  BP: (109-111)/(68-70) 111/68        There is no height or weight on file to calculate BMI.    Physical Exam  Vitals and nursing note reviewed.   Constitutional:       Appearance: She is well-developed. She is ill-appearing.      Comments: Lethargic, however responds yes/no to questions and follows commands   HENT:      Head: Normocephalic and atraumatic.      Mouth/Throat:      Mouth: Mucous membranes are moist.   Eyes:      General: No scleral icterus.     Extraocular Movements: Extraocular movements intact.   Cardiovascular:      Rate and Rhythm: Normal rate and regular rhythm.      Heart sounds: Normal heart sounds.   Pulmonary:      Effort: Tachypnea present. No respiratory distress.      Breath sounds: Decreased breath sounds (Right mid and lower) and rales present. No wheezing.   Chest:      Comments: Large ecchymosis noted to right chest extending to right flank. No TTP  Abdominal:      General: Bowel sounds are normal.      Palpations: Abdomen is soft.      Tenderness: There is no abdominal tenderness.      Comments: Periumbilical ecchymosis. No abdominal tenderness on exam. Bowel sounds normal.   Musculoskeletal:         General: No tenderness. Normal range of motion.      Cervical back: Normal range of motion and neck supple.      Right lower le+ Edema present.      Left lower le+ Edema present.   Skin:     General: Skin is warm and dry.      Capillary Refill: Capillary refill takes less than 2 seconds.      Findings: Bruising present. No rash.   Neurological:      General: No focal deficit present.      Cranial Nerves: No cranial nerve deficit.      Comments: Only responding to questions with yes/no answers. Unable to determine orientation. No  focal neurologic deficit. No facial droop.   Psychiatric:         Behavior: Behavior normal.         Thought Content: Thought content normal.         Judgment: Judgment normal.             Significant Labs:   All pertinent labs within the past 24 hours have been reviewed.  CBC:   Recent Labs   Lab 01/19/22 2153   WBC 4.79   HGB 12.2   HCT 41.2        CMP:   Recent Labs   Lab 01/19/22 2153      K 5.8*      CO2 21*   *   BUN 54*   CREATININE 2.5*   CALCIUM 10.5   PROT 7.8   ALBUMIN 3.1*   BILITOT 1.2*   ALKPHOS 63   AST 62*   ALT 16   ANIONGAP 18*   EGFRNONAA 16.7*     Urine Studies:   Recent Labs   Lab 01/19/22 2153   COLORU Margaret   APPEARANCEUA Hazy*   PHUR 5.0   SPECGRAV 1.015   PROTEINUA Negative   GLUCUA Negative   KETONESU Negative   BILIRUBINUA Negative   OCCULTUA Negative   NITRITE Negative   LEUKOCYTESUR Negative       Significant Imaging: I have reviewed all pertinent imaging results/findings within the past 24 hours.  X-Ray Chest AP Portable  Narrative: EXAMINATION:  XR CHEST AP PORTABLE    CLINICAL HISTORY:  Unspecified atrial fibrillation    TECHNIQUE:  Single frontal view of the chest was performed.    COMPARISON:  08/20/2017.    FINDINGS:  Monitoring EKG leads are present.  The trachea is unremarkable.  There is stable enlargement of the cardiomediastinal silhouette.  There is obscuration of the right aspect of the cardiomediastinal silhouette.  There is no evidence of free air beneath the hemidiaphragms.  There is a large right-sided pleural effusion with complete opacification of the right hemithorax.  No significant right to left midline shift is identified.  The left hemithorax remains clear.  There are degenerative changes in the osseous structures.  Impression: Large right-sided pleural effusion with no significant cardiomediastinal shift.    Cardiomegaly.    Electronically signed by: Venu Dunn MD  Date:    01/19/2022  Time:    22:27  CT Head Without  Contrast  Narrative: EXAMINATION:  CT HEAD WITHOUT CONTRAST    CLINICAL HISTORY:  Mental status change, unknown cause;    TECHNIQUE:  Low dose axial CT images obtained throughout the head without the use of intravenous contrast.  Axial, sagittal and coronal reconstructions were performed.    COMPARISON:  CT head 09/22/2015.    FINDINGS:  Generalized cerebral volume loss with compensatory enlargement of the ventricles.  No evidence of hydrocephalus.    Remote lacunar-type infarct in the right corona radiata, similar to prior.  Small area of encephalomalacia in the right cerebellum, likely remote infarct.  Patchy areas of hypoattenuation in the supratentorial white matter, nonspecific but likely chronic microvascular ischemic change.  No parenchymal mass, hemorrhage, edema or major vascular distribution infarct. No extra-axial blood or fluid collections.    No acute displaced calvarial fracture.  Mastoid air cells and paranasal sinuses are essentially clear.  There are postoperative changes in the globes.  Impression: 1. No acute intracranial abnormality.  Additional evaluation, as clinically warranted.  2. Remote lacunar-type infarct in the right corona radiata.  3. Small area of encephalomalacia in the right cerebellum, likely remote infarct.  4. Generalized cerebral volume loss and chronic microvascular ischemic change.    Electronically signed by resident: Jose Raul Simmons  Date:    01/19/2022  Time:    21:34    Electronically signed by: Venu Dunn MD  Date:    01/19/2022  Time:    22:00        Assessment/Plan:     * Acute on chronic combined systolic and diastolic heart failure  - patient with significant bilateral LE edema  - CXR with pleural effusion, requiring 2L NC  -   - last echo in 2019 with EF 35%, grade III LVDD, moderately decreased LV systolic function,  PAP 64 mmHg  - repeat TTE ordered  - home regimen: bumex 1 mg BID  - hospital diuresis: lasix 80 mg IV BID  - continue metoprolol XL,  amlodipine  - monitor response with strict I&Os, daily weights, O2 requirements  - cardiac, fluid restricted diet    DEXTER (acute kidney injury)  Stage 3 chronic kidney disease    - Cr 2.5, BUN 54 (baseline 1, 22)  - suspect 2/2 hypervolemia  - UA without infection  - monitor closely with diuresis  - renally dose medications  - avoid nephrotoxins    Large pleural effusion  - CXR with large R pleural effusion  - possibly 2/2 hypervolemia, diuresing with IV lasix  - pulmonology consulted to evaluate for need of diagnostic/therapeutic thoracentesis  - monitor O2 requirements    Spontaneous ecchymosis  - significant periumbilical and R breast/flank ecchymosis noted on exam. No tenderness. Per daughter this appeared within the last couple of days. No fall or trauma  - H/H stable at baseline 12.2/41/2  - no abdominal complaints or tenderness on exam  - CT C/A/P ordered to rule out hemorrhage in setting of DEXTER and pleural effusion    Hyperkalemia  - K 5.8  - s/p 80 mg IV lasix  - monitor with daily labs    Chronic atrial fibrillation  Current long term use of oral anticoagulation    - continue metoprolol  daily  - continue xarelto 20 mg daily    Essential hypertension  - continue amlodipine, metoprolol    H/O: CVA (cerebrovascular accident)  - continue ASA, statin    VTE Risk Mitigation (From admission, onward)         Ordered     rivaroxaban tablet 20 mg  Daily         01/20/22 0243     Reason for No Pharmacological VTE Prophylaxis  Once        Question:  Reasons:  Answer:  Already adequately anticoagulated on oral Anticoagulants    01/19/22 2334     IP VTE HIGH RISK PATIENT  Once         01/19/22 2334     Place sequential compression device  Until discontinued         01/19/22 2334                   Kassidy Amado PA-C  Department of Hospital Medicine   [facility] - Emergency Dept

## 2022-01-20 NOTE — NURSING
Patient arrived to [facility] from   Type of stroke/diagnosis:  hypotension    Current symptoms: AMS    Skin assessment done: Y  Wounds noted: stage 2 ulcer x2 on buttocks, large bruise to abd RUQ, ulcer to RLE    *If wounds noted, was Wound Care consulted? Y    Wilfrido Completed? Y/ Fail    Patient Belongings on Admit: none    CCM notified: MARCUS Ryan

## 2022-01-20 NOTE — SUBJECTIVE & OBJECTIVE
Past Medical History:   Diagnosis Date    Anticoagulant long-term use     Arthritis     Atrial fibrillation     Cataract     Cataract associated with another disorder     CHF (congestive heart failure) 1/10/2013    Hypertension     Potassium (K) deficiency     Stroke        Past Surgical History:   Procedure Laterality Date    HYSTERECTOMY         Review of patient's allergies indicates:   Allergen Reactions    Iodine and iodide containing products Hives    Sulfa (sulfonamide antibiotics) Hives       Family History     Problem Relation (Age of Onset)    Hypertension Mother, Sister, Brother        Tobacco Use    Smoking status: Never Smoker    Smokeless tobacco: Not on file   Substance and Sexual Activity    Alcohol use: No    Drug use: No    Sexual activity: Not on file         Review of Systems   Unable to perform ROS: Mental status change     Objective:     Vital Signs (Most Recent):  Temp: 98.2 °F (36.8 °C) (01/19/22 2305)  Pulse: 71 (01/20/22 0854)  Resp: 20 (01/20/22 0500)  BP: 90/60 (01/20/22 0854)  SpO2: (!) 92 % (01/20/22 0500) Vital Signs (24h Range):  Temp:  [97.6 °F (36.4 °C)-98.2 °F (36.8 °C)] 98.2 °F (36.8 °C)  Pulse:  [56-71] 71  Resp:  [15-22] 20  SpO2:  [92 %-100 %] 92 %  BP: ()/(57-70) 90/60     Weight: 66 kg (145 lb 8.1 oz)  Body mass index is 24.98 kg/m².    No intake or output data in the 24 hours ending 01/20/22 1026    Physical Exam  Vitals and nursing note reviewed.   Constitutional:       Appearance: She is well-developed. She is ill-appearing.      Comments: Lethargic, however responds yes/no to questions and follows commands   HENT:      Head: Normocephalic and atraumatic.      Mouth/Throat:      Mouth: Mucous membranes are moist.   Eyes:      General: No scleral icterus.     Extraocular Movements: Extraocular movements intact.   Cardiovascular:      Rate and Rhythm: Normal rate and regular rhythm.      Heart sounds: Normal heart sounds.   Pulmonary:      Effort:  Tachypnea present. No respiratory distress.      Breath sounds: Decreased breath sounds (Right mid and lower) and rales present. No wheezing.   Chest:      Comments: Large ecchymosis noted to right chest extending to right flank. No TTP  Abdominal:      General: Bowel sounds are normal.      Palpations: Abdomen is soft.      Tenderness: There is no abdominal tenderness.      Comments: Periumbilical ecchymosis. No abdominal tenderness on exam. Bowel sounds normal.   Musculoskeletal:         General: No tenderness. Normal range of motion.      Cervical back: Normal range of motion and neck supple.      Right lower le+ Edema present.      Left lower le+ Edema present.   Skin:     General: Skin is warm and dry.      Capillary Refill: Capillary refill takes less than 2 seconds.      Findings: Bruising present. No rash.   Neurological:      General: No focal deficit present.      Cranial Nerves: No cranial nerve deficit.      Comments: Not responding to questions, only to sternal rub. Unable to determine orientation. No focal neurologic deficit. No facial droop.   Psychiatric:         Behavior: Behavior normal.         Thought Content: Thought content normal.         Judgment: Judgment normal.         Vents:       Lines/Drains/Airways     Peripheral Intravenous Line                 Peripheral IV - Single Lumen 22 G Left Hand -- days         Peripheral IV - Single Lumen 09/24/15 0540 Right Forearm 2310 days                Significant Labs:    CBC/Anemia Profile:  Recent Labs   Lab 223   WBC 4.79   HGB 12.2   HCT 41.2      MCV 98   RDW 14.8*        Chemistries:  Recent Labs   Lab 22  2153      K 5.8*      CO2 21*   BUN 54*   CREATININE 2.5*   CALCIUM 10.5   ALBUMIN 3.1*   PROT 7.8   BILITOT 1.2*   ALKPHOS 63   ALT 16   AST 62*       CMP:   Recent Labs   Lab 22  2153      K 5.8*      CO2 21*   *   BUN 54*   CREATININE 2.5*   CALCIUM 10.5   PROT 7.8    ALBUMIN 3.1*   BILITOT 1.2*   ALKPHOS 63   AST 62*   ALT 16   ANIONGAP 18*   EGFRNONAA 16.7*     Cardiac Markers: No results for input(s): CKMB, TROPONINT, MYOGLOBIN in the last 48 hours.  Coagulation: No results for input(s): PT, INR, APTT in the last 48 hours.  Urine Studies:   Recent Labs   Lab 01/19/22  2153   COLORU Margaret   APPEARANCEUA Hazy*   PHUR 5.0   SPECGRAV 1.015   PROTEINUA Negative   GLUCUA Negative   KETONESU Negative   BILIRUBINUA Negative   OCCULTUA Negative   NITRITE Negative   LEUKOCYTESUR Negative     All pertinent labs within the past 24 hours have been reviewed.  WBC 4.7.    Significant Imaging:   I have reviewed all pertinent imaging results/findings within the past 24 hours.  CT: I have reviewed all pertinent results/findings within the past 24 hours and my personal findings are:  Large R pleural effusion consuming almost entire R lung  CXR: I have reviewed all pertinent results/findings within the past 24 hours and my personal findings are:  L R pleural effusion  Echo: I have reviewed all pertinent results/findings within the past 24 hours and my personal findings are:  EF 35, LVDD, PAP 50

## 2022-01-20 NOTE — CARE UPDATE
"RAPID RESPONSE NURSE CHART REVIEW        Chart Reviewed: 01/20/2022, 11:04 AM    MRN: [id number]  Bed: 345/345 A    Dx: Acute on chronic combined systolic and diastolic heart failure    Celina Parra has a past medical history of Anticoagulant long-term use, Arthritis, Atrial fibrillation, Cataract, Cataract associated with another disorder, CHF (congestive heart failure), Hypertension, Potassium (K) deficiency, and Stroke.    Last VS: BP 90/60   Pulse 71   Temp 98.2 °F (36.8 °C) (Oral)   Resp 20   Ht 5' 4" (1.626 m)   Wt 66 kg (145 lb 8.1 oz)   SpO2 (!) 92%   Breastfeeding No   BMI 24.98 kg/m²     24H Vital Sign Range:  Temp:  [97.6 °F (36.4 °C)-98.2 °F (36.8 °C)]   Pulse:  [56-71]   Resp:  [15-22]   BP: ()/(57-70)   SpO2:  [92 %-100 %]     Level of Consciousness (AVPU): responds to pain    Recent Labs     01/19/22  2153   WBC 4.79   HGB 12.2   HCT 41.2          Recent Labs     01/19/22  2153      K 5.8*      CO2 21*   CREATININE 2.5*   *        No results for input(s): PH, PCO2, PO2, HCO3, POCSATURATED, BE in the last 72 hours.     OXYGEN:  Flow (L/min): 3     O2 Device (Oxygen Therapy): nasal cannula    MEWS score: 3    Bedside RNCelina contacted. No concerns verbalized at this time. Instructed to call [phone] for further concerns or assistance.    Shereen Sanchez RN        "

## 2022-01-20 NOTE — PROGRESS NOTES
"The pt is an 88 yof who initially presented to the ED 1/19 with altered mental status & decreased oral intake.    PMH:  Congestive heart failure, atrial fib, CKD, venous stasis ulcers, immobility.    1) Shock. Concerning for sepsis. Possible sources include lung/pleura, stercoral colitis, or soft tissue infection. The pt's skin is dark black under buttocks, & there is a bullae present. These changes are concerning for ischemic DTI possibly with underlying infection. Administer empiric abx. Resuscitated with crystalloid. Supporting with vasopressors.  2) Oliguric DEXTER. No hydronephrosis on CT. ?ATN. Monitor UOP & creatinine.  3) Encephalopathy. CT brain reviewed. Suspect metabolic encephalopathy. Monitor neurologic status.  4) Troponinemia. Echo reviewed. No wall motion abnormalities. Suspect this is demand ischemia in the setting of shock.  5) Atrial fib. Chronic. Rate is elevated in the setting of shock. Goal rate is less than 120 in this patient. Can consider phenylephrine or beta blocker if rate exceeds goal. Will resume anticoagulation with heparin infusion following thoracentesis.  6) Massive pleural effusion. Recommend diagnostic & therapeutic thoracentesis.  7) Gluteal wound & venous stasis leg wounds. Wound care is consulted.  8) Tinea skin of abdomen. Administer fluconazole.  9) Goals of care counseling. Spoke with pt's children at length. This is an acute decline for Mrs Parra that they were not expecting. The patient has not been hospitalized in many years, & they did not anticipate such a sudden decline. They are considering limitations to invasive life support based on Mrs Parra's previous expressed wish that she does not want to "suffer at the end," but they were not at a point where they were ready to set any limitations this evening.      Grzegorz Felix MD  "

## 2022-01-20 NOTE — ED PROVIDER NOTES
Encounter Date: 1/19/2022       History     Chief Complaint   Patient presents with    Fatigue     Nonverbal at bedside. Bedbound. Bilateral arm weakness. Pt lives alone. Sitter was out with covid and not sure if anyone has been able to come see her     Patient is an 89 yo woman with medical history of HFrEF (EF 35%, G3DD), AFib (on Xarelto), CKD3, and venous stasis ulcers presents to the ED for increased sleepiness, inattentiveness, and decreased PO intake for the past three days. She is accompanied by her daughter who provides history. Daughter states that patient uses a wheelchair and is usually talkative and independent of most ADLs during the day but has been very sleepy lately. She has had reduced food intake for the past months but has worsened in the past 3 days. Yesterday, she did not eat anything and today, she has only taken one glass of fluid. Currently, patient is barely talking and requires numerous prompts to follow commands. Daughter was recently diagnosed with COVID and made efforts to avoid her mother as the patient is not vaccinated. Daughter reached out to PCP to obtain HH mostly for patient's venous stasis leg wounds but felt she was becoming worse at home and more sleepy so decided to bring her in rather than wait for HH nurse.         Review of patient's allergies indicates:   Allergen Reactions    Iodine and iodide containing products Hives    Sulfa (sulfonamide antibiotics) Hives     Past Medical History:   Diagnosis Date    Anticoagulant long-term use     Arthritis     Atrial fibrillation     Cataract     Cataract associated with another disorder     CHF (congestive heart failure) 1/10/2013    Hypertension     Potassium (K) deficiency     Stroke      Past Surgical History:   Procedure Laterality Date    HYSTERECTOMY       Family History   Problem Relation Age of Onset    Hypertension Mother     Hypertension Sister     Hypertension Brother     Amblyopia Neg Hx     Blindness  Neg Hx     Cancer Neg Hx     Cataracts Neg Hx     Diabetes Neg Hx     Glaucoma Neg Hx     Macular degeneration Neg Hx     Retinal detachment Neg Hx     Strabismus Neg Hx     Stroke Neg Hx     Thyroid disease Neg Hx      Social History     Tobacco Use    Smoking status: Never Smoker   Substance Use Topics    Alcohol use: No    Drug use: No     Review of Systems   Unable to perform ROS: Mental status change       Physical Exam     Initial Vitals   BP Pulse Resp Temp SpO2   01/19/22 1813 01/19/22 1813 01/19/22 1813 01/19/22 1815 01/19/22 1813   (P) 112/66 (!) (P) 58 (P) 16 97.6 °F (36.4 °C) (P) 100 %      MAP       --                Physical Exam    Constitutional: She appears lethargic.   HENT:   Head: Atraumatic.   Neck: No JVD present.   Cardiovascular: Normal rate.   Pulmonary/Chest: Breath sounds normal. No respiratory distress. She has no wheezes. She has no rhonchi. She exhibits no tenderness.   Reduced breath sounds on right anterior chest but present   Abdominal: Bowel sounds are normal. She exhibits no distension. There is no abdominal tenderness.   Musculoskeletal:         General: Edema (LE edema bilaterally, 3+) present.     Lymphadenopathy:     She has no cervical adenopathy.   Neurological: She appears lethargic.   Skin: Skin is warm. Rash (erythematous, macular rash interginous folds, under breasts) noted.   Psychiatric: She has a normal mood and affect.         ED Course   Procedures  Labs Reviewed   CBC W/ AUTO DIFFERENTIAL - Abnormal; Notable for the following components:       Result Value    MCHC 29.6 (*)     RDW 14.8 (*)     Lymph # 0.3 (*)     Gran % 83.1 (*)     Lymph % 5.6 (*)     All other components within normal limits   COMPREHENSIVE METABOLIC PANEL - Abnormal; Notable for the following components:    Potassium 5.8 (*)     CO2 21 (*)     Glucose 119 (*)     BUN 54 (*)     Creatinine 2.5 (*)     Albumin 3.1 (*)     Total Bilirubin 1.2 (*)     AST 62 (*)     Anion Gap 18 (*)      eGFR if  19.2 (*)     eGFR if non  16.7 (*)     All other components within normal limits   URINALYSIS, REFLEX TO URINE CULTURE - Abnormal; Notable for the following components:    Appearance, UA Hazy (*)     All other components within normal limits    Narrative:     Specimen Source->Urine   SARS-COV-2 RDRP GENE    Narrative:     This test utilizes isothermal nucleic acid amplification   technology to detect the SARS-CoV-2 RdRp nucleic acid segment.   The analytical sensitivity (limit of detection) is 125 genome   equivalents/mL.   A POSITIVE result implies infection with the SARS-CoV-2 virus;   the patient is presumed to be contagious.     A NEGATIVE result means that SARS-CoV-2 nucleic acids are not   present above the limit of detection. A NEGATIVE result should be   treated as presumptive. It does not rule out the possibility of   COVID-19 and should not be the sole basis for treatment decisions.   If COVID-19 is strongly suspected based on clinical and exposure   history, re-testing using an alternate molecular assay should be   considered.   This test is only for use under the Food and Drug   Administration s Emergency Use Authorization (EUA).   Commercial kits are provided by [employer].   Performance characteristics of the EUA have been independently   verified by [facility] Department of   Pathology and Laboratory Medicine.   _________________________________________________________________   The authorized Fact Sheet for Healthcare Providers and the authorized Fact   Sheet for Patients of the ID NOW COVID-19 are available on the FDA   website:     https://www.fda.gov/media/065824/download  https://www.fda.gov/media/456331/download         EKG Readings: (Independently Interpreted)   Initial Reading: No STEMI. Rhythm: Atrial Fibrillation.       Imaging Results          X-Ray Chest AP Portable (Final result)  Result time 01/19/22 22:27:28    Final result by  Venu Dunn MD (01/19/22 22:27:28)                 Impression:      Large right-sided pleural effusion with no significant cardiomediastinal shift.    Cardiomegaly.      Electronically signed by: Venu Dunn MD  Date:    01/19/2022  Time:    22:27             Narrative:    EXAMINATION:  XR CHEST AP PORTABLE    CLINICAL HISTORY:  Unspecified atrial fibrillation    TECHNIQUE:  Single frontal view of the chest was performed.    COMPARISON:  08/20/2017.    FINDINGS:  Monitoring EKG leads are present.  The trachea is unremarkable.  There is stable enlargement of the cardiomediastinal silhouette.  There is obscuration of the right aspect of the cardiomediastinal silhouette.  There is no evidence of free air beneath the hemidiaphragms.  There is a large right-sided pleural effusion with complete opacification of the right hemithorax.  No significant right to left midline shift is identified.  The left hemithorax remains clear.  There are degenerative changes in the osseous structures.                               CT Head Without Contrast (Final result)  Result time 01/19/22 22:00:42    Final result by Venu Dunn MD (01/19/22 22:00:42)                 Impression:      1. No acute intracranial abnormality.  Additional evaluation, as clinically warranted.  2. Remote lacunar-type infarct in the right corona radiata.  3. Small area of encephalomalacia in the right cerebellum, likely remote infarct.  4. Generalized cerebral volume loss and chronic microvascular ischemic change.    Electronically signed by resident: Jose Raul Simmons  Date:    01/19/2022  Time:    21:34    Electronically signed by: Venu Dunn MD  Date:    01/19/2022  Time:    22:00             Narrative:    EXAMINATION:  CT HEAD WITHOUT CONTRAST    CLINICAL HISTORY:  Mental status change, unknown cause;    TECHNIQUE:  Low dose axial CT images obtained throughout the head without the use of intravenous contrast.  Axial, sagittal and coronal reconstructions were  performed.    COMPARISON:  CT head 09/22/2015.    FINDINGS:  Generalized cerebral volume loss with compensatory enlargement of the ventricles.  No evidence of hydrocephalus.    Remote lacunar-type infarct in the right corona radiata, similar to prior.  Small area of encephalomalacia in the right cerebellum, likely remote infarct.  Patchy areas of hypoattenuation in the supratentorial white matter, nonspecific but likely chronic microvascular ischemic change.  No parenchymal mass, hemorrhage, edema or major vascular distribution infarct. No extra-axial blood or fluid collections.    No acute displaced calvarial fracture.  Mastoid air cells and paranasal sinuses are essentially clear.  There are postoperative changes in the globes.                              X-Rays:   Independently Interpreted Readings:   Other Readings:  CXR with opacity involving entire right lung.     Medications - No data to display  Medical Decision Making:   Initial Assessment:   87 yo female with HFrEF, AFIb (Xarelto), CKD who presents with new encephalopathy and decreased PO intake x 3 days. Not vaccinated against COVID and daughter is recently positive.   Differential Diagnosis:   Hypernatremia, SDH, sepsis, COVID, UTI  ED Management:  87 yo woman presents with encephalopathy and lethargy. Obtained CBC, CMP, CT head, COVID, UA. COVID negative.        APC / Resident Notes:   87 yo woman with h/o HF, AFib, presents with new encephalopathy and decreased PO intake including some medications  x 3 days. VSS on initial evaluations and EKG showing AFib. Patient unable to provide history and requires prompting for simple commands. CBC, CMP, UA, CT head obtained. CT head without concerning findings. CMP with mild hyperkalemia and DEXTER. CBC without anemia or leukocytosis. CXR with opacity of entire right lung field and read as likely pleural effusion. Breath sounds reduced but present on anterior chest. SpO2 90-92% on RA but placed on 2L NC given CXR  findings. Advised to place patient in OBS.        Attending Attestation:   Physician Attestation Statement for Resident:  As the supervising MD   Physician Attestation Statement: I have personally seen and examined this patient.   I agree with the above history. -:   As the supervising MD I agree with the above PE.    As the supervising MD I agree with the above treatment, course, plan, and disposition.                         Clinical Impression:   Final diagnoses:  [I48.91] A-fib  [J90] Pleural effusion (Primary)  [N17.9] Acute renal failure, unspecified acute renal failure type  [R53.1] Weakness       Patient with weakness and lethargy in settingof reduced PO intake who presents with DEXTER, mild hyperkalemia and right lung opacity.    ED Disposition Condition    Observation               Shabnam Lyon MD  Resident  01/19/22 [id number]       Elizabeth Figueroa MD  01/19/22 [id number]

## 2022-01-20 NOTE — SUBJECTIVE & OBJECTIVE
Past Medical History:   Diagnosis Date    Anticoagulant long-term use     Arthritis     Atrial fibrillation     Cataract     Cataract associated with another disorder     CHF (congestive heart failure) 1/10/2013    Hypertension     Potassium (K) deficiency     Stroke        Past Surgical History:   Procedure Laterality Date    HYSTERECTOMY         Review of patient's allergies indicates:   Allergen Reactions    Iodine and iodide containing products Hives    Sulfa (sulfonamide antibiotics) Hives       No current facility-administered medications on file prior to encounter.     Current Outpatient Medications on File Prior to Encounter   Medication Sig    amLODIPine (NORVASC) 10 MG tablet TAKE 1 TABLET BY MOUTH EVERY DAY    bumetanide (BUMEX) 1 MG tablet TAKE 1 TABLET BY MOUTH TWICE A DAY    ciclopirox (PENLAC) 8 % Soln Apply topically nightly. (Patient not taking: Reported on 6/28/2021)    diclofenac sodium (VOLTAREN) 1 % Gel APPLY 2 GRAMS TOPICALLY TO AFFECTED AREA ONCE DAILY    ferrous sulfate (FEOSOL) 325 mg (65 mg iron) Tab tablet Take 1 tablet (325 mg total) by mouth daily with breakfast. (Patient not taking: Reported on 6/28/2021)    fluticasone propionate (FLONASE) 50 mcg/actuation nasal spray 2 sprays (100 mcg total) by Each Nostril route once daily.    metoprolol succinate (TOPROL-XL) 50 MG 24 hr tablet Take 3 tablets (150 mg total) by mouth once daily.    potassium chloride SA (K-DUR,KLOR-CON) 20 MEQ tablet TAKE 1 TABLET BY MOUTH ONCE DAILY    rivaroxaban (XARELTO) 20 mg Tab Take 1 tablet (20 mg total) by mouth once daily.     Family History     Problem Relation (Age of Onset)    Hypertension Mother, Sister, Brother        Tobacco Use    Smoking status: Never Smoker    Smokeless tobacco: Not on file   Substance and Sexual Activity    Alcohol use: No    Drug use: No    Sexual activity: Not on file     Review of Systems   Unable to perform ROS: Mental status change     Objective:      Vital Signs (Most Recent):  Temp: 97.6 °F (36.4 °C) (22 1815)  Pulse: 66 (22 2315)  Resp: (!) 22 (22)  BP: 111/68 (22 2305)  SpO2: 96 % (22) Vital Signs (24h Range):  Temp:  [97.6 °F (36.4 °C)] 97.6 °F (36.4 °C)  Pulse:  [58-66] 66  Resp:  [16-22] 22  SpO2:  [96 %-100 %] 96 %  BP: (109-111)/(68-70) 111/68        There is no height or weight on file to calculate BMI.    Physical Exam  Vitals and nursing note reviewed.   Constitutional:       Appearance: She is well-developed. She is ill-appearing.      Comments: Lethargic, however responds yes/no to questions and follows commands   HENT:      Head: Normocephalic and atraumatic.      Mouth/Throat:      Mouth: Mucous membranes are moist.   Eyes:      General: No scleral icterus.     Extraocular Movements: Extraocular movements intact.   Cardiovascular:      Rate and Rhythm: Normal rate and regular rhythm.      Heart sounds: Normal heart sounds.   Pulmonary:      Effort: Tachypnea present. No respiratory distress.      Breath sounds: Decreased breath sounds (Right mid and lower) and rales present. No wheezing.   Chest:      Comments: Large ecchymosis noted to right chest extending to right flank. No TTP  Abdominal:      General: Bowel sounds are normal.      Palpations: Abdomen is soft.      Tenderness: There is no abdominal tenderness.      Comments: Periumbilical ecchymosis. No abdominal tenderness on exam. Bowel sounds normal.   Musculoskeletal:         General: No tenderness. Normal range of motion.      Cervical back: Normal range of motion and neck supple.      Right lower le+ Edema present.      Left lower le+ Edema present.   Skin:     General: Skin is warm and dry.      Capillary Refill: Capillary refill takes less than 2 seconds.      Findings: Bruising present. No rash.   Neurological:      General: No focal deficit present.      Cranial Nerves: No cranial nerve deficit.      Comments: Only responding to  questions with yes/no answers. Unable to determine orientation. No focal neurologic deficit. No facial droop.   Psychiatric:         Behavior: Behavior normal.         Thought Content: Thought content normal.         Judgment: Judgment normal.             Significant Labs:   All pertinent labs within the past 24 hours have been reviewed.  CBC:   Recent Labs   Lab 01/19/22 2153   WBC 4.79   HGB 12.2   HCT 41.2        CMP:   Recent Labs   Lab 01/19/22 2153      K 5.8*      CO2 21*   *   BUN 54*   CREATININE 2.5*   CALCIUM 10.5   PROT 7.8   ALBUMIN 3.1*   BILITOT 1.2*   ALKPHOS 63   AST 62*   ALT 16   ANIONGAP 18*   EGFRNONAA 16.7*     Urine Studies:   Recent Labs   Lab 01/19/22 2153   COLORU Margaret   APPEARANCEUA Hazy*   PHUR 5.0   SPECGRAV 1.015   PROTEINUA Negative   GLUCUA Negative   KETONESU Negative   BILIRUBINUA Negative   OCCULTUA Negative   NITRITE Negative   LEUKOCYTESUR Negative       Significant Imaging: I have reviewed all pertinent imaging results/findings within the past 24 hours.  X-Ray Chest AP Portable  Narrative: EXAMINATION:  XR CHEST AP PORTABLE    CLINICAL HISTORY:  Unspecified atrial fibrillation    TECHNIQUE:  Single frontal view of the chest was performed.    COMPARISON:  08/20/2017.    FINDINGS:  Monitoring EKG leads are present.  The trachea is unremarkable.  There is stable enlargement of the cardiomediastinal silhouette.  There is obscuration of the right aspect of the cardiomediastinal silhouette.  There is no evidence of free air beneath the hemidiaphragms.  There is a large right-sided pleural effusion with complete opacification of the right hemithorax.  No significant right to left midline shift is identified.  The left hemithorax remains clear.  There are degenerative changes in the osseous structures.  Impression: Large right-sided pleural effusion with no significant cardiomediastinal shift.    Cardiomegaly.    Electronically signed by: Venu Dunn  MD  Date:    01/19/2022  Time:    22:27  CT Head Without Contrast  Narrative: EXAMINATION:  CT HEAD WITHOUT CONTRAST    CLINICAL HISTORY:  Mental status change, unknown cause;    TECHNIQUE:  Low dose axial CT images obtained throughout the head without the use of intravenous contrast.  Axial, sagittal and coronal reconstructions were performed.    COMPARISON:  CT head 09/22/2015.    FINDINGS:  Generalized cerebral volume loss with compensatory enlargement of the ventricles.  No evidence of hydrocephalus.    Remote lacunar-type infarct in the right corona radiata, similar to prior.  Small area of encephalomalacia in the right cerebellum, likely remote infarct.  Patchy areas of hypoattenuation in the supratentorial white matter, nonspecific but likely chronic microvascular ischemic change.  No parenchymal mass, hemorrhage, edema or major vascular distribution infarct. No extra-axial blood or fluid collections.    No acute displaced calvarial fracture.  Mastoid air cells and paranasal sinuses are essentially clear.  There are postoperative changes in the globes.  Impression: 1. No acute intracranial abnormality.  Additional evaluation, as clinically warranted.  2. Remote lacunar-type infarct in the right corona radiata.  3. Small area of encephalomalacia in the right cerebellum, likely remote infarct.  4. Generalized cerebral volume loss and chronic microvascular ischemic change.    Electronically signed by resident: Jose Raul Simmons  Date:    01/19/2022  Time:    21:34    Electronically signed by: Venu Dunn MD  Date:    01/19/2022  Time:    22:00

## 2022-01-20 NOTE — PROGRESS NOTES
Pharmacokinetic Initial Assessment: IV Vancomycin    Assessment/Plan:    Initiate intravenous vancomycin with loading dose of 1250 mg once with subsequent doses when random concentrations are less than 20 mcg/mL  Desired empiric serum trough concentration is 10 to 20 mcg/mL  Draw vancomycin random level on 01/21 at 0430.  Pharmacy will continue to follow and monitor vancomycin.      Please contact pharmacy at extension [phone] with any questions regarding this assessment.     Thank you for the consult,   Roshan Marcus       Patient brief summary:  Celina Parra is a 88 y.o. female initiated on antimicrobial therapy with IV Vancomycin for treatment of suspected bacteremia    Drug Allergies:   Review of patient's allergies indicates:   Allergen Reactions    Iodine and iodide containing products Hives    Sulfa (sulfonamide antibiotics) Hives       Actual Body Weight:   66 kg    Renal Function:   Estimated Creatinine Clearance: 14.5 mL/min (A) (based on SCr of 2.5 mg/dL (H)).,     Dialysis Method (if applicable):  N/A    CBC (last 72 hours):  Recent Labs   Lab Result Units 01/19/22  2153   WBC K/uL 4.79   Hemoglobin g/dL 12.2   Hematocrit % 41.2   Platelets K/uL 160   Gran % % 83.1*   Lymph % % 5.6*   Mono % % 10.9   Eosinophil % % 0.0   Basophil % % 0.2   Differential Method  Automated       Metabolic Panel (last 72 hours):  Recent Labs   Lab Result Units 01/19/22  2153   Sodium mmol/L 144   Potassium mmol/L 5.8*   Chloride mmol/L 105   CO2 mmol/L 21*   Glucose mg/dL 119*   Glucose, UA  Negative   BUN mg/dL 54*   Creatinine mg/dL 2.5*   Albumin g/dL 3.1*   Total Bilirubin mg/dL 1.2*   Alkaline Phosphatase U/L 63   AST U/L 62*   ALT U/L 16       Drug levels (last 3 results):  No results for input(s): VANCOMYCINRA, VANCOMYCINPE, VANCOMYCINTR in the last 72 hours.    Microbiologic Results:  Microbiology Results (last 7 days)       Procedure Component Value Units Date/Time    Blood culture [[id number]]     Order Status:  Sent Specimen: Blood     Blood culture [[id number]]     Order Status: Sent Specimen: Blood     Aerobic culture [[id number]]     Order Status: No result Specimen: Pleural Fluid     Culture, Anaerobic [[id number]]     Order Status: No result Specimen: Pleural Fluid     Gram stain [[id number]]     Order Status: No result Specimen: Pleural Fluid     AFB Culture & Smear [[id number]]     Order Status: No result Specimen: Pleural Fluid

## 2022-01-20 NOTE — NURSING
Dr. Underwood made aware of patient's morning BP of 90/60 via text page.  Morning anit-hypertensive medications not given.  No new orders given.  Dr. Underwood made aware of patient's afternoon BP 79/53 via text page.  Follow up BP registered at 70/59.  MD to consult Critical Care. Rapid Response called. Necessary interventions to stabilize patient for transfer initiated per Rapid Response Team.  Patient transferred to Neuro CC bed 9060.  Report called to Candelario MUHAMMAD.  Patient's daughter, Elisa, also made aware of new developments. Care relinquished.

## 2022-01-20 NOTE — ACP (ADVANCE CARE PLANNING)
[facility] Code Status Documentation Note    Spoke with Ms. Parra's daughter Maribel regarding pt's end of life intentions and goals of care. Discussed code status and explained differences between full code and DNR, and answered all questions to the pt's satisfaction.     At this time, daughter desires for pt to remain be full code. Order has been placed in chart to reflect their wishes.    Juan Underwood MD  Attending Physician  Department of [facility]  Epic secure chat preferred, or Spect[employer]Link ext. [phone]  1/20/2022      Advance Care Planning     Date: 01/20/2022    Code Status  In light of the patients advanced and life limiting illness,I engaged the the family in a conversation about the patient's preferences for care  at the very end of life. The patient wishes to have CPR or other invasive treatments performed when her heart and/or breathing stops. I communicated to the family that a full code order would be placed in her medical record to reflect this preference.  I spent a total of 15 minutes engaging the patient in this advance care planning discussion.

## 2022-01-21 NOTE — PLAN OF CARE
[facility] Care Plan    POC reviewed with Celina Parra and family at 0300. Pt unable to verbalize understanding d/t condition. Will continue to monitor. See below and flowsheets for full assessment and VS info.     Vaso at 0.04  Levo currently at 0.75    Pt on bipap 50/5  ABG q 2     Neuro:  [place] Coma Scale  Best Eye Response: 2-->(E2) to pain  Best Motor Response: 5-->(M5) localizes pain  Best Verbal Response: 2-->(V2) incomprehensible speech  Po Coma Scale Score: 9  Assessment Qualifiers: patient not sedated/intubated        24hr Temp:  [93.92 °F (34.4 °C)-99.68 °F (37.6 °C)]     CV:   Rhythm: atrial rhythm  BP goals:   SBP < 180  MAP > 65    Resp:   O2 Device (Oxygen Therapy): BiPAP  Oxygen Concentration (%): 50    Plan: N/A    GI/:     Diet/Nutrition Received: NPO  Last Bowel Movement: 01/20/22  Voiding Characteristics: urethral catheter (bladder)    Intake/Output Summary (Last 24 hours) at 1/21/2022 0555  Last data filed at 1/21/2022 0546  Gross per 24 hour   Intake 4228.68 ml   Output 575 ml   Net 3653.68 ml     Unmeasured Output  Urine Occurrence: 1  Stool Occurrence: 1    Labs/Accuchecks:  Recent Labs   Lab 01/20/22  1435 01/20/22  1445   WBC 2.93*  --    RBC 4.07  --    HGB 12.0  --    HCT 38.7 38     --       Recent Labs   Lab 01/21/22  0139     135*   K 5.0  5.0   CO2 21*  19*     102   BUN 66*  66*   CREATININE 2.6*  2.4*   ALKPHOS 58   ALT 20   AST 66*   BILITOT 1.8*      Recent Labs   Lab 01/20/22  1435   INR 1.5*      Recent Labs   Lab 01/20/22  1819   TROPONINI 1.837*       Electrolytes: No replacement orders  Accuchecks: none    Gtts:   NORepinephrine bitartrate-D5W 0.75 mcg/kg/min (01/21/22 0600)    vasopressin 0.04 Units/min (01/21/22 0600)       LDA/Wounds:  Lines/Drains/Airways       Central Venous Catheter Line              Percutaneous Central Line Insertion/Assessment - Triple Lumen  01/20/22 1176 right internal jugular <1 day              Drain                    Urethral Catheter 01/20/22 1330 Double-lumen 16 Fr. <1 day              Arterial Line              Arterial Line 01/20/22 1420 Right Radial <1 day              Peripheral Intravenous Line                   Peripheral IV - Single Lumen 22 G Left Hand -- days         Peripheral IV - Single Lumen 09/24/15 0540 Right Forearm 2311 days         Peripheral IV - Single Lumen 01/20/22 1200 22 G Left Hand <1 day

## 2022-01-21 NOTE — ASSESSMENT & PLAN NOTE
Takes metoprolol PO as outpatient. Anticoagulation with Xarelto (last dose 1/18)  Currently rate elevated >100    --Holding anticoagulation for thoracentesis  --will restart anticoagulation 1/20 AM  --If rate above 120, will change norepinephrine to phenylephrine.   --If persists despite the vasopressor change, will consider esmolol infusion

## 2022-01-21 NOTE — DISCHARGE SUMMARY
[facility]  Critical Care Medicine  Discharge Summary      Patient Name: Celina Parra  MRN: [id number]  Admission Date: 1/19/2022  Hospital Length of Stay: 1 days  Discharge Date and Time: 1/21/2022     Attending Physician: Grzegorz Felix MD   Discharging Provider: Elana Ryan PA-C  Primary Care Provider: Chago Jones MD  Reason for Admission: Shock    HPI:   Celina Parra is a 88 y.o. female with HFrEF (EF 50%, G3DD, PAP 50), AFib (on Xarelto), CKD3, immobility and venous stasis ulcers who [place] [facility] for worsening fatigue and decreased PO intake x 3 days. Patient lives with daughter, Maxine, and at baseline is oriented x 3. She is wheelchair bound but able to make her own food and bathe. Daughter reports patient has been more fatigued and not acting herself for the last 3 days which prompted presentation to the ED.     In the ED, patient arrived hemodynamically stable with AMS. Labs signficiant for DEXTER, hyperkalemia, and elevated BNP. CXR with large right sided pleural effusion. CT head negative for acute intracranial abnormalities. CT chest/abd/pelvis with significant right sided pleural effusion, multiple liver lesions, stercoral colitis, and possible fluid collection in gluteal folds. Patient was placed in observation unit per hospital medicine for heart failure exacerbation and DEXTER. She received one dose of furosemide with no UOP.     On morning of 1/20, patient with worsening hypotension. Rapid response called. Critical care medicine consulted for shock. Patient transferred to MICU hemodynamically unstable and started on norepinephrine with profound encephalopathy.       * No surgery found *    Indwelling Lines/Drains at Time of Discharge:   Lines/Drains/Airways     Central Venous Catheter Line            Percutaneous Central Line Insertion/Assessment - Triple Lumen  01/20/22 1345 right internal jugular <1 day          Drain                 Urethral  "Catheter 01/20/22 1330 Double-lumen 16 Fr. <1 day          Arterial Line            Arterial Line 01/20/22 1420 Right Radial <1 day              Hospital Course:   In the MICU, arterial line and central line placed. Patient started on norepinephrine and vasopressin for presumed septic shock. She received 2L of LR and started on broad spectrum abx. Patient was placed on bipap due to mild hypercapnia on ABG. Thoracentesis completed with fluid resulting as exudative but no organism grown on preliminary read. Lactic acid continued to rise despite fluid resuscitation and abx. Patient with steady acidosis on ABG this morning. However, on AM ABG, acidosis significantly worse. Patient became bradycardic and dropped her blood pressure and quickly became asystole before interventions could be implemented. Patient had a short run of vtach then developed asystole again. Patient subsequently declared dead. Family updated via telephone and at the bedside. Condolences were offered and  was notified.       Consults (From admission, onward)        Status Ordering Provider     Pharmacy to dose Vancomycin consult  Once        Provider:  (Not yet assigned)   "And" Linked Group Details    Acknowledged CHARISMA BROCK     Inpatient consult to Critical Care Medicine  Once        Provider:  (Not yet assigned)    Completed PARKER MARRERO     Inpatient consult to Pulmonology  Once        Provider:  (Not yet assigned)    Completed ERIKA CHAMPAGNE        Significant Labs:  All pertinent labs within the past 24 hours have been reviewed.    Significant Imaging:  I have reviewed all pertinent imaging results/findings within the past 24 hours.    Pending Diagnostic Studies:     Procedure Component Value Units Date/Time    Urinalysis, Reflex to Urine Culture Urine, Clean Catch [[id number]] Collected: 01/20/22 1303    Order Status: Sent Lab Status: In process Updated: 01/20/22 1303    Specimen: Urine         Final Active " Diagnoses:    Diagnosis Date Noted POA    PRINCIPAL PROBLEM:  Shock, unspecified [R57.9]  Yes    Acute on chronic combined systolic and diastolic heart failure [I50.43] 01/20/2022 Yes    Spontaneous ecchymosis [R23.3] 01/20/2022 Yes    Encephalopathy, metabolic [G93.41] 01/20/2022 Yes    Venous stasis ulcer of calf [I83.002, L97.209] 01/20/2022 Yes    Gluteal cleft wound [S31.809A] 01/20/2022 Yes    Acute hypercapnic respiratory failure [J96.02] 01/20/2022 Yes    ACP (advance care planning) [Z71.89]  Not Applicable    Hyperkalemia [E87.5] 01/19/2022 Yes    DEXTER (acute kidney injury) [N17.9] 01/19/2022 Yes    Large pleural effusion [J90] 01/19/2022 Yes    Stage 3 chronic kidney disease [N18.30] 06/28/2021 Yes    Morbid (severe) obesity due to excess calories [E66.01] 05/29/2019 Yes    Long term current use of anticoagulant therapy [Z79.01] 10/08/2015 Not Applicable    Essential hypertension [I10] 05/27/2015 Yes     Chronic    Chronic atrial fibrillation [I48.20] 05/27/2015 Yes     Chronic    Pulmonary HTN [I27.20] 02/14/2013 Yes     Chronic    H/O: CVA (cerebrovascular accident) [Z86.73]  Not Applicable     Chronic      Problems Resolved During this Admission:     Neuro  Encephalopathy, metabolic  Likely metabolic encephalopathy. CT head on admission negative for acute intracranial abnormalities.     --Treat possible underlying infection  --correct electrolytes  --neuro checks  --Currently protecting airway    Derm  Venous stasis ulcer of calf  On bilateral posterior calves. Wound care consulted.     Pulmonary  Acute hypercapnic respiratory failure  Patient with Hypercapnic Respiratory failure which is Acute.  she is not on home oxygen. Supplemental oxygen was provided and noted- Oxygen Concentration (%):  [50-60] 50.   Signs/symptoms of respiratory failure include- tachypnea, increased work of breathing and use of accessory muscles. Contributing diagnoses includes - Pleural effusion Labs and images  were reviewed. Patient Has recent ABG, which has been reviewed. Will treat underlying causes and adjust management of respiratory failure as follows-    --bipap initiated for mild hypercapnia. Worsening hypercapnia overnight requiring increasing in driving pressure.   --thoracentesis  exudative.   --ABG prn    Cardiac/Vascular  Acute on chronic combined systolic and diastolic heart failure  TTE on admission with EF at 55% with LV diastolic dysfunction. Elevated BNP on presentation.     Chronic atrial fibrillation  Takes metoprolol PO as outpatient. Anticoagulation with Xarelto (last dose )  Currently rate elevated >100    --Holding anticoagulation for thoracentesis  --will restart anticoagulation  AM  --patient  prior to re-initiation    Essential hypertension  Holding home meds in setting of shock    Renal/  DEXTER (acute kidney injury)  Unclear etiology. Possible iATN or prerenal in setting of poor PO intake. CT abd/pelvis with no signs of hydronephrosis.   Oliguric    --jimenez in place    Hyperkalemia  See DEXTER    Hematology  Spontaneous ecchymosis  Eccchymosis noted to right breast, RUQ and around umbilicus. Unclear etiology but CT chest/abd/pelvis with no signs of evolving hematoma.     --Anticoagulation  held    Long term current use of anticoagulant therapy  Takes xarelto for chronic afib. Currently held.     Endocrine  Morbid (severe) obesity due to excess calories  Noted.     Orthopedic  Gluteal cleft wound  Bilateral gluteal clefts with skin tears and signs of DTI. Present on admission. Wound care consulted.     Palliative Care  ACP (advance care planning)  Engaged family in GOC discussion. Discussed patient is currently experience multi-organ failure on life support. We discussed code status and recommended against chest compressions and mechanical intubation if patient's clinical status were to decline. Family states understanding and re-iterates they do not want her to suffer. They would  like to discuss further with the family before any further decisions are made.  notified.     Further [facility] discussions held on  with Dr. Sundeep Nicole. Code status changed to partial code at that time. They were ok with time limited trial of mechanical ventilation but did not want chest compressions or shock in the event of cardiac arrest.     Other  * Shock, unspecified  Most suspicious for septic shock with source likely from pleural effusion vs stercoral colitis vs wounds. UA negative on admission.     --received 2L LR  --BCx pending  --Thoracentesis  - exudative. No growth on gram stain.   --Continue vanc / zosyn  --Stress dose steroids  --Continue norepinephrine and vasopressin. Wean for MAP goal >65  --vasopressors requirements worsening overnight with rising lactic acidosis    Large pleural effusion  Diagnostic and therapeutic thoracentesis today.Studies suggestive of exudative. No organism seen on gram stain.       Discharged Condition:     Disposition:       Patient Instructions:   No discharge procedures on file.  Medications:  Reconciled Home Medications:      Medication List      STOP taking these medications    amLODIPine 10 MG tablet  Commonly known as: NORVASC     bumetanide 1 MG tablet  Commonly known as: BUMEX     ciclopirox 8 % Soln  Commonly known as: PENLAC     diclofenac sodium 1 % Gel  Commonly known as: VOLTAREN     ferrous sulfate 325 mg (65 mg iron) Tab tablet  Commonly known as: FEOSOL     fluticasone propionate 50 mcg/actuation nasal spray  Commonly known as: FLONASE     metoprolol succinate 50 MG 24 hr tablet  Commonly known as: TOPROL-XL     potassium chloride SA 20 MEQ tablet  Commonly known as: K-DUR,KLOR-CON     rivaroxaban 20 mg Tab  Commonly known as: XARELTO          Discussed with Dr. Felix.     Discharge >30 minutes         Elana Ryan PA-C  Critical Care Medicine  [facility]

## 2022-01-21 NOTE — H&P
[facility]  Critical Care Medicine  History & Physical    Patient Name: Celina Parra  MRN: [id number]  Admission Date: 1/19/2022  Hospital Length of Stay: 0 days  Code Status: Full Code  Attending Physician: Grzegorz Felix MD   Primary Care Provider: Chago Jones MD   Principal Problem: Shock, unspecified    Subjective:     HPI:  Celina Parra is a 88 y.o. female with HFrEF (EF 50%, G3DD, PAP 50), AFib (on Xarelto), CKD3, immobility and venous stasis ulcers who [place] [facility] [place] for worsening fatigue and decreased PO intake x 3 days. Patient lives with daughter, Maxine, and at baseline is oriented x 3. She is wheelchair bound but able to make her own food and bathe. Daughter reports patient has been more fatigued and not acting herself for the last 3 days which prompted presentation to the ED.     In the ED, patient arrived hemodynamically stable with AMS. Labs signficiant for DEXTER, hyperkalemia, and elevated BNP. CXR with large right sided pleural effusion. CT head negative for acute intracranial abnormalities. CT chest/abd/pelvis with significant right sided pleural effusion, multiple liver lesions, stercoral colitis, and possible fluid collection in gluteal folds. Patient was placed in observation unit per hospital medicine for heart failure exacerbation and DEXTER. She received one dose of furosemide with no UOP.     On morning of 1/20, patient with worsening hypotension. Rapid response called. Critical care medicine consulted for shock. Patient transferred to MICU hemodynamically unstable and started on norepinephrine with profound encephalopathy.       Hospital/ICU Course:  No notes on file     Past Medical History:   Diagnosis Date    Anticoagulant long-term use     Arthritis     Atrial fibrillation     Cataract     Cataract associated with another disorder     CHF (congestive heart failure) 1/10/2013    Hypertension     Potassium (K) deficiency     Stroke         Past Surgical History:   Procedure Laterality Date    HYSTERECTOMY         Review of patient's allergies indicates:   Allergen Reactions    Iodine and iodide containing products Hives    Sulfa (sulfonamide antibiotics) Hives       Family History     Problem Relation (Age of Onset)    Hypertension Mother, Sister, Brother        Tobacco Use    Smoking status: Never Smoker    Smokeless tobacco: Not on file   Substance and Sexual Activity    Alcohol use: No    Drug use: No    Sexual activity: Not on file      Review of Systems   Unable to perform ROS: Mental status change     Objective:     Vital Signs (Most Recent):  Temp: (!) 94.1 °F (34.5 °C) (01/20/22 1505)  Pulse: (!) 114 (01/20/22 1505)  Resp: (!) 29 (01/20/22 1505)  BP: 111/75 (01/20/22 1447)  SpO2: (!) 92 % (01/20/22 1423) Vital Signs (24h Range):  Temp:  [94.1 °F (34.5 °C)-98.2 °F (36.8 °C)] 94.1 °F (34.5 °C)  Pulse:  [] 114  Resp:  [7-36] 29  SpO2:  [92 %-100 %] 92 %  BP: ()/(53-90) 111/75  Arterial Line BP: (109)/(71) 109/71   Weight: 66 kg (145 lb 8.1 oz)  Body mass index is 24.98 kg/m².      Intake/Output Summary (Last 24 hours) at 1/20/2022 1603  Last data filed at 1/20/2022 1540  Gross per 24 hour   Intake 720.06 ml   Output 0 ml   Net 720.06 ml       Physical Exam  Vitals reviewed.   Constitutional:       Appearance: She is well-developed and well-nourished. She is ill-appearing.      Interventions: Nasal cannula in place.   HENT:      Head: Normocephalic and atraumatic.   Eyes:      Extraocular Movements: EOM normal.      Pupils:      Left eye: Pupil is round and reactive.      Comments: Right cataract   Neck:      Thyroid: No thyromegaly.      Trachea: No tracheal deviation.   Cardiovascular:      Rate and Rhythm: Normal rate and regular rhythm.      Heart sounds: No murmur heard.  No friction rub. No gallop.    Pulmonary:      Effort: Tachypnea and accessory muscle usage present.      Breath sounds: Examination of the  right-upper field reveals decreased breath sounds and rhonchi. Examination of the left-upper field reveals rhonchi. Examination of the right-middle field reveals decreased breath sounds. Examination of the right-lower field reveals decreased breath sounds. Decreased breath sounds and rhonchi present. No wheezing or rales.   Abdominal:      General: Bowel sounds are decreased.      Palpations: Abdomen is soft.      Tenderness: There is abdominal tenderness.   Genitourinary:     Comments: Coelho in place.   Musculoskeletal:         General: Normal range of motion.      Right lower leg: 3+ Edema present.      Left lower leg: 3+ Edema present.   Skin:     General: Skin is warm and dry.   Neurological:      Mental Status: She is lethargic.      GCS: GCS eye subscore is 2. GCS verbal subscore is 1. GCS motor subscore is 4.      Sensory: No sensory deficit.         Vents:  Oxygen Concentration (%): 96 (01/20/22 0854)  Lines/Drains/Airways     Central Venous Catheter Line            Percutaneous Central Line Insertion/Assessment - Triple Lumen  01/20/22 1345 right internal jugular <1 day          Drain                 Urethral Catheter 01/20/22 1330 Double-lumen 16 Fr. <1 day          Arterial Line            Arterial Line 01/20/22 1420 Right Radial <1 day          Peripheral Intravenous Line                 Peripheral IV - Single Lumen 22 G Left Hand -- days         Peripheral IV - Single Lumen 09/24/15 0540 Right Forearm 2310 days              Significant Labs:    CBC/Anemia Profile:  Recent Labs   Lab 01/19/22 2153 01/20/22  1435 01/20/22  1445   WBC 4.79 2.93*  --    HGB 12.2 12.0  --    HCT 41.2 38.7 38    151  --    MCV 98 95  --    RDW 14.8* 14.7*  --         Chemistries:  Recent Labs   Lab 01/19/22 2153 01/20/22  1435    141   K 5.8* 5.0    106   CO2 21* 24   BUN 54* 70*   CREATININE 2.5* 2.7*   CALCIUM 10.5 9.3   ALBUMIN 3.1* 2.6*   PROT 7.8 6.2   BILITOT 1.2* 1.5*   ALKPHOS 63 63   ALT 16 18    AST 62* 67*   MG  --  2.1   PHOS  --  5.6*       All pertinent labs within the past 24 hours have been reviewed.    Significant Imaging: I have reviewed all pertinent imaging results/findings within the past 24 hours.    Assessment/Plan:     Neuro  Encephalopathy, metabolic  Likely metabolic encephalopathy. CT head on admission negative for acute intracranial abnormalities.     --Treat possible underlying infection  --correct electrolytes  --neuro checks  --EEG if does not improve  --Currently protecting airway    Derm  Venous stasis ulcer of calf  On bilateral posterior calves. Wound care consulted.     Pulmonary  Acute hypercapnic respiratory failure  Patient with Hypercapnic Respiratory failure which is Acute.  she is not on home oxygen. Supplemental oxygen was provided and noted- Oxygen Concentration (%):  [96] 96.   Signs/symptoms of respiratory failure include- tachypnea, increased work of breathing and use of accessory muscles. Contributing diagnoses includes - Pleural effusion Labs and images were reviewed. Patient Has recent ABG, which has been reviewed. Will treat underlying causes and adjust management of respiratory failure as follows-    --bipap initiated for mild hypercapnia  --thoracentesis 1/20  --ABG prn    Cardiac/Vascular  Acute on chronic combined systolic and diastolic heart failure  TTE on admission with EF at 55% with LV diastolic dysfunction. Elevated BNP on presentation.     --monitor fluid status    Chronic atrial fibrillation  Takes metoprolol PO as outpatient. Anticoagulation with Xarelto (last dose 1/18)  Currently rate elevated >100    --Holding anticoagulation for thoracentesis  --will restart anticoagulation 1/20 AM  --If rate above 120, will change norepinephrine to phenylephrine.   --If persists despite the vasopressor change, will consider esmolol infusion    Essential hypertension  Holding home meds in setting of shock    Renal/  DEXTER (acute kidney injury)  Unclear etiology.  Possible iATN or prerenal in setting of poor PO intake. CT abd/pelvis with no signs of hydronephrosis.   Oliguric    --jimenez in place  --monitor UOP  --renal dose meds / avoid nephrotoxic agents    Hyperkalemia  See DEXTER    Hematology  Spontaneous ecchymosis  Eccchymosis noted to right breast, RUQ and around umbilicus. Unclear etiology but CT chest/abd/pelvis with no signs of evolving hematoma.     --Anticoagulation currently held  --will need to monitor for expansion after restarting anticoagulation    Long term current use of anticoagulant therapy  Takes xarelto for chronic afib. Currently held.     Endocrine  Morbid (severe) obesity due to excess calories  Noted.     Orthopedic  Gluteal cleft wound  Bilateral gluteal clefts with skin tears and signs of DTI. Present on admission. Wound care consulted.     Palliative Care  ACP (advance care planning)  Engaged family in GOC discussion. Discussed patient is currently experience multi-organ failure on life support. We discussed code status and recommended against chest compressions and mechanical intubation if patient's clinical status were to decline. Family states understanding and re-iterates they do not want her to suffer. They would like to discuss further with the family before any further decisions are made.  notified.     Other  * Shock, unspecified  Most suspicious for septic shock with source likely from pleural effusion vs stercoral colitis vs wounds. UA negative on admission.     --received 2L LR  --BCx pending  --Thoracentesis 1/20 - follow up studies  --Continue vanc / zosyn  --Stress dose steroids  --Continue norepinephrine and vasopressin. Wean for MAP goal >65    Large pleural effusion  Diagnostic and therapeutic thoracentesis today. Follow up studies     Discussed case with Dr. Felix. Spoke multiple times with daughters and family at the bedside throughout the day.     Critical Care Time: 100 minutes  Critical secondary to Patient has a  condition that poses threat to life and bodily function: shock on vasopressors; acute hypercapnic respiratory failure on bipap; multiple GOC discussions.      Critical care was time spent personally by me on the following activities: development of treatment plan with patient or surrogate and bedside caregivers, discussions with consultants, evaluation of patient's response to treatment, examination of patient, ordering and performing treatments and interventions, ordering and review of laboratory studies, ordering and review of radiographic studies, pulse oximetry, re-evaluation of patient's condition. This critical care time did not overlap with that of any other provider or involve time for any procedures.     Elana Ryan PA-C  Critical Care Medicine  [facility]

## 2022-01-21 NOTE — ASSESSMENT & PLAN NOTE
Patient with Hypercapnic Respiratory failure which is Acute.  she is not on home oxygen. Supplemental oxygen was provided and noted- Oxygen Concentration (%):  [50-60] 50.   Signs/symptoms of respiratory failure include- tachypnea, increased work of breathing and use of accessory muscles. Contributing diagnoses includes - Pleural effusion Labs and images were reviewed. Patient Has recent ABG, which has been reviewed. Will treat underlying causes and adjust management of respiratory failure as follows-    --bipap initiated for mild hypercapnia. Worsening hypercapnia overnight requiring increasing in driving pressure.   --thoracentesis 1/20 exudative.   --ABG prn

## 2022-01-21 NOTE — ASSESSMENT & PLAN NOTE
TTE on admission with EF at 55% with LV diastolic dysfunction. Elevated BNP on presentation.     --monitor fluid status

## 2022-01-21 NOTE — PLAN OF CARE
[facility] - Neuro Critical Care  Discharge Reassessment    Primary Care Provider: Chago Jones MD    Expected Discharge Date: 1/25/2022     Per MD: Pt upgrade from [facility] this afternoon with hypotension, hypothermia, AMS, multiple wounds.   Not medically ready for discharge.     Reassessment (most recent)     Discharge Reassessment - 01/20/22 1836        Discharge Reassessment    Assessment Type Discharge Planning Reassessment     Did the patient's condition or plan change since previous assessment? Yes     Communicated PAUL with patient/caregiver Date not available/Unable to determine     Discharge Plan A Home Health     Discharge Plan B Skilled Nursing Facility     DME Needed Upon Discharge  other (see comments)   tbd    Discharge Barriers Identified None     Why the patient remains in the hospital Requires continued medical care               Mayra Maurice RN, CCRN-K, [facility]  Neuro-Critical Care   [phone]

## 2022-01-21 NOTE — ASSESSMENT & PLAN NOTE
Diagnostic and therapeutic thoracentesis today.Studies suggestive of exudative. No organism seen on gram stain.

## 2022-01-21 NOTE — ASSESSMENT & PLAN NOTE
Unclear etiology. Possible iATN or prerenal in setting of poor PO intake. CT abd/pelvis with no signs of hydronephrosis.   Oliguric    --jimenez in place

## 2022-01-21 NOTE — ASSESSMENT & PLAN NOTE
Engaged family in GOC discussion. Discussed patient is currently experience multi-organ failure on life support. We discussed code status and recommended against chest compressions and mechanical intubation if patient's clinical status were to decline. Family states understanding and re-iterates they do not want her to suffer. They would like to discuss further with the family before any further decisions are made.  notified.     Further GOC discussions held on 1/20 with Dr. Sundeep Nicole. Code status changed to partial code at that time. They were ok with time limited trial of mechanical ventilation but did not want chest compressions or shock in the event of cardiac arrest.

## 2022-01-21 NOTE — PLAN OF CARE
Death Note  Critical Care Medicine      Admit Date: 2022    Date of Death: 2022    Time of Death: 0841    Attending Physician: Grzegorz Felix MD    Principal Diagnoses: Shock, unspecified    Preliminary Cause of Death: Shock, unspecified    Secondary Diagnoses:   Active Hospital Problems    Diagnosis  POA    *Shock, unspecified [R57.9]  Yes    Acute on chronic combined systolic and diastolic heart failure [I50.43]  Yes    Spontaneous ecchymosis [R23.3]  Yes    Encephalopathy, metabolic [G93.41]  Yes    Venous stasis ulcer of calf [I83.002, L97.209]  Yes    Gluteal cleft wound [S31.809A]  Yes    Acute hypercapnic respiratory failure [J96.02]  Yes    ACP (advance care planning) [Z71.89]  Not Applicable    Hyperkalemia [E87.5]  Yes    DEXTER (acute kidney injury) [N17.9]  Yes    Large pleural effusion [J90]  Yes    Stage 3 chronic kidney disease [N18.30]  Yes    Morbid (severe) obesity due to excess calories [E66.01]  Yes    Long term current use of anticoagulant therapy [Z79.01]  Not Applicable    Essential hypertension [I10]  Yes     Chronic    Chronic atrial fibrillation [I48.20]  Yes     Chronic    Pulmonary HTN [I27.20]  Yes     Chronic    H/O: CVA (cerebrovascular accident) [Z86.73]  Not Applicable     Chronic      Resolved Hospital Problems   No resolved problems to display.        Discharged Condition:     HPI:  eClina Parra is a 88 y.o. female with HFrEF (EF 50%, G3DD, PAP 50), AFib (on Xarelto), CKD3, immobility and venous stasis ulcers who presented to [facility] on 2022 for worsening fatigue and decreased PO intake x 3 days. Patient lives with daughter, Maxine, and at baseline is oriented x 3. She is wheelchair bound but able to make her own food and bathe. Daughter reports patient has been more fatigued and not acting herself for the last 3 days which prompted presentation to the ED.     In the ED, patient arrived hemodynamically stable with AMS. Labs signficiant  for DEXTER, hyperkalemia, and elevated BNP. CXR with large right sided pleural effusion. CT head negative for acute intracranial abnormalities. CT chest/abd/pelvis with significant right sided pleural effusion, multiple liver lesions, stercoral colitis, and possible fluid collection in gluteal folds. Patient was placed in observation unit per hospital medicine for heart failure exacerbation and DEXTER. She received one dose of furosemide with no UOP.     On morning of 1/20, patient with worsening hypotension. Rapid response called. Critical care medicine consulted for shock. Patient transferred to MICU hemodynamically unstable and started on norepinephrine with profound encephalopathy.       Hospital/ICU Course:  No notes on file     Consultations were held with the family yesterday regarding the patient's expected poor prognosis. At the direction of the family, the patient was made partial code with no ACLS protocol to be undertaken in the event of an arrest.    Death Note:  The team was called to the bedside for worsening respiratory status and bradycardia. On arrival she was in cardiopulmonary arrest, found to be apneic and in asystole with a short run of vtach followed by asystole again. On my exam she was apneic, without central pulses, unresponsive to stimuli, with fixed dilated pupils. TOD 0841.    Cesario Arango, HO2  U-EM

## 2022-01-21 NOTE — ASSESSMENT & PLAN NOTE
Eccchymosis noted to right breast, RUQ and around umbilicus. Unclear etiology but CT chest/abd/pelvis with no signs of evolving hematoma.     --Anticoagulation  held

## 2022-01-21 NOTE — ASSESSMENT & PLAN NOTE
Takes metoprolol PO as outpatient. Anticoagulation with Xarelto (last dose )  Currently rate elevated >100    --Holding anticoagulation for thoracentesis  --will restart anticoagulation  AM  --patient  prior to re-initiation

## 2022-01-21 NOTE — ASSESSMENT & PLAN NOTE
Patient with Hypercapnic Respiratory failure which is Acute.  she is not on home oxygen. Supplemental oxygen was provided and noted- Oxygen Concentration (%):  [96] 96.   Signs/symptoms of respiratory failure include- tachypnea, increased work of breathing and use of accessory muscles. Contributing diagnoses includes - Pleural effusion Labs and images were reviewed. Patient Has recent ABG, which has been reviewed. Will treat underlying causes and adjust management of respiratory failure as follows-    --bipap initiated for mild hypercapnia  --thoracentesis 1/20  --ABG prn

## 2022-01-21 NOTE — ASSESSMENT & PLAN NOTE
Likely metabolic encephalopathy. CT head on admission negative for acute intracranial abnormalities.     --Treat possible underlying infection  --correct electrolytes  --neuro checks  --EEG if does not improve  --Currently protecting airway

## 2022-01-21 NOTE — HOSPITAL COURSE
In the MICU, arterial line and central line placed. Patient started on norepinephrine and vasopressin for presumed septic shock. She received 2L of LR and started on broad spectrum abx. Patient was placed on bipap due to mild hypercapnia on ABG. Thoracentesis completed with fluid resulting as exudative but no organism grown on preliminary read. Lactic acid continued to rise despite fluid resuscitation and abx. Patient with steady acidosis on ABG this morning. However, on AM ABG, acidosis significantly worse. Patient became bradycardic and dropped her blood pressure and quickly became asystole before interventions could be implemented. Patient had a short run of vtach then developed asystole again. Patient subsequently declared dead. Family updated via telephone and at the bedside. Condolences were offered and  was notified.

## 2022-01-21 NOTE — ASSESSMENT & PLAN NOTE
Most suspicious for septic shock with source likely from pleural effusion vs stercoral colitis vs wounds. UA negative on admission.     --received 2L LR  --BCx pending  --Thoracentesis 1/20 - exudative. No growth on gram stain.   --Continue vanc / zosyn  --Stress dose steroids  --Continue norepinephrine and vasopressin. Wean for MAP goal >65  --vasopressors requirements worsening overnight with rising lactic acidosis

## 2022-01-21 NOTE — CHAPLAIN
Responded to unit request that pt had  and family was on the way.  provided unit staff w/DCP and awaited family. Once family arrived  encountered four family members and provided grief care and grief packet. All questions answered. Family very tearful.  is available and is awaiting completed DCP.      Kai, Spiritual Care  Spectra *[phone]

## 2022-01-21 NOTE — ASSESSMENT & PLAN NOTE
Likely metabolic encephalopathy. CT head on admission negative for acute intracranial abnormalities.     --Treat possible underlying infection  --correct electrolytes  --neuro checks  --Currently protecting airway

## 2022-01-21 NOTE — PROCEDURES
"Celina Parra is a 88 y.o. female patient.    Temp: (!) 94.46 °F (34.7 °C) (22)  Pulse: (!) 114 (22)  Resp: 14 (22)  BP: 111/75 (22 1447)  SpO2: (!) 88 % (22)  Weight: 66 kg (145 lb 8.1 oz) (22)  Height: 5' 4" (162.6 cm) (22)       Thoracentesis    Date/Time: 2022 6:30 PM  Location procedure was performed: [facility]ITICAL [facility]IC[facility]  Performed by: Sundeep Nicole MD  Authorized by: Sundeep Nicole MD   Pre-operative diagnosis: right pleural effusion  Post-operative diagnosis: right pleural effusion  Consent Done: Yes  Consent: Written consent obtained.  Risks and benefits: risks, benefits and alternatives were discussed  Consent given by: power of   Patient understanding: patient states understanding of the procedure being performed  Patient consent: the patient's understanding of the procedure matches consent given  Procedure consent: procedure consent matches procedure scheduled  Relevant documents: relevant documents present and verified  Test results: test results available and properly labeled  Site marked: the operative site was marked  Imaging studies: imaging studies available  Required items: required blood products, implants, devices, and special equipment available  Patient identity confirmed: , name, provided demographic data and MRN  Time out: Immediately prior to procedure a "time out" was called to verify the correct patient, procedure, equipment, support staff and site/side marked as required.  Procedure purpose: diagnostic and therapeutic  Indications: pleural effusion  Preparation: Patient was prepped and draped in the usual sterile fashion.  Local anesthesia used: yes    Anesthesia:  Local anesthesia used: yes  Local Anesthetic: lidocaine 1% without epinephrine  Anesthetic total: 4 mL    Patient sedated: no  Description of findings: large right pleural effusion on US with one septation and no " loculations.    Preparation: skin prepped with ChloraPrep  Patient position: sitting  Ultrasound guidance: yes  Location: right lateral  Intercostal space: 7th  Puncture method: over-the-needle catheter  Needle size: 22  Catheter size: 18 gauge  Number of attempts: 1  Drainage amount: 1000 ml  Fluid characteristics: clear yellow with orange tinge.   Patient tolerance: Patient tolerated the procedure well with no immediate complications  Chest x-ray performed: yes  Chest x-ray interpreted by me.  Chest x-ray findings: pleural effusion  Complications: No  Estimated blood loss (mL): 1  Specimens: No  Implants: No  Drainage Tube: removed      I independently evaluated the patient. I was present for the procedure & directly supervised the resident performing the procedure. I have reviewed the resident's procedure note & agree with the findings.    Grzegorz Felix MD      1/20/2022

## 2022-01-21 NOTE — PLAN OF CARE
Family had questions about Advanced care planning. Discussed what mechanical ventilation would involve. Discussed patient current clinical status. They do not want patient to suffer and state that they feel patient would want to be intubated for a time limited trial, but she would not want compressions done. All questions answered. Order for partial code placed.     Sundeep Nicole MD  [facility] PGY VI

## 2022-01-21 NOTE — ASSESSMENT & PLAN NOTE
Bilateral gluteal clefts with skin tears and signs of DTI. Present on admission. Wound care consulted.

## 2022-01-21 NOTE — ASSESSMENT & PLAN NOTE
Most suspicious for septic shock with source likely from pleural effusion vs stercoral colitis vs wounds. UA negative on admission.     --received 2L LR  --BCx pending  --Thoracentesis 1/20 - follow up studies  --Continue vanc / zosyn  --Stress dose steroids  --Continue norepinephrine and vasopressin. Wean for MAP goal >65

## 2022-01-21 NOTE — ASSESSMENT & PLAN NOTE
Unclear etiology. Possible iATN or prerenal in setting of poor PO intake. CT abd/pelvis with no signs of hydronephrosis.   Oliguric    --jimenez in place  --monitor UOP  --renal dose meds / avoid nephrotoxic agents

## 2022-01-21 NOTE — ASSESSMENT & PLAN NOTE
Engaged family in GOC discussion. Discussed patient is currently experience multi-organ failure on life support. We discussed code status and recommended against chest compressions and mechanical intubation if patient's clinical status were to decline. Family states understanding and re-iterates they do not want her to suffer. They would like to discuss further with the family before any further decisions are made.  notified.

## 2022-01-21 NOTE — ASSESSMENT & PLAN NOTE
Eccchymosis noted to right breast, RUQ and around umbilicus. Unclear etiology but CT chest/abd/pelvis with no signs of evolving hematoma.     --Anticoagulation currently held  --will need to monitor for expansion after restarting anticoagulation

## 2022-01-21 NOTE — NURSING
At approx 0830, pt BP rapidly decreasing, levo infusion titrated up with no effect. MARCUS Huitron with CCM called to bedside. 1 amp bicarb ordered, but pt went asystole before med could be given. No resuscitative measures initiated per code status. Pt pronounced at 0841 by MD Nba.  notified.

## 2022-01-21 NOTE — PLAN OF CARE
[facility] - Neuro Critical Care  Discharge Final Note    Primary Care Provider: Chago Jones MD    Expected Discharge Date: 2022     Per MD:  Admit Date: 2022     Date of Death: 2022     Time of Death: 841    Final Discharge Note (most recent)     Final Note - 22 0918        Final Note    Assessment Type Final Discharge Note     Anticipated Discharge Disposition                  Mayra Maurice RN, CCRN-K, [facility]  Neuro-Critical Care   X [phone]

## 2022-01-21 NOTE — PROGRESS NOTES
Pharmacist Renal Dose Adjustment Note    Celina Parra is a 88 y.o. female being treated with the medication zosyn.    Patient Data:    Vital Signs (Most Recent):  Temp: 99.5 °F (37.5 °C) (01/21/22 0820)  Pulse: (!) 35 (01/21/22 0820)  Resp: 16 (01/21/22 0820)  BP: 120/80 (01/21/22 0605)  SpO2: (!) 92 % (01/21/22 0820)   Vital Signs (72h Range):  Temp:  [93.92 °F (34.4 °C)-99.86 °F (37.7 °C)]   Pulse:  []   Resp:  [7-42]   BP: ()/(53-90)   SpO2:  [52 %-100 %]   Arterial Line BP: ()/(54-72)      Recent Labs   Lab 01/20/22  1435 01/20/22  1755 01/21/22  0139   CREATININE 2.7* 2.5* 2.6*  2.4*     Serum creatinine: 2.6 mg/dL (H) 01/21/22 0139  Estimated creatinine clearance: 14 mL/min (A)    Medication: Zosyn IV every 8 hours will be changed to zosyn IV every 12 hours.    Pharmacist's Name: Kale Devries  Pharmacist's Extension: [phone]

## 2022-01-25 LAB
BACTERIA BLD CULT: NORMAL
BACTERIA BLD CULT: NORMAL
BACTERIA FLD AEROBE CULT: NO GROWTH
GRAM STN SPEC: NORMAL
GRAM STN SPEC: NORMAL

## 2022-01-28 LAB — BACTERIA SPEC ANAEROBE CULT: NORMAL

## 2023-11-07 NOTE — ASSESSMENT & PLAN NOTE
Call patient parent and verify name and , patient was requesting and additional diagnoses codes submitted in order to get the claim paid.     Patient voice understand    Nani Ash ([id number] Collusion) On bilateral posterior calves. Wound care consulted.

## 2024-02-04 NOTE — TELEPHONE ENCOUNTER
Hi, please call [facility] and confirm that they have this rx from me today and that they can dispense it and let the nataleeaghter know as well.  Thank you, Chago Jones     Normal for race